# Patient Record
Sex: MALE | Race: WHITE | NOT HISPANIC OR LATINO | Employment: PART TIME | URBAN - METROPOLITAN AREA
[De-identification: names, ages, dates, MRNs, and addresses within clinical notes are randomized per-mention and may not be internally consistent; named-entity substitution may affect disease eponyms.]

---

## 2023-09-14 PROBLEM — I38 ENDOCARDITIS: Status: ACTIVE | Noted: 2023-09-14

## 2023-09-14 PROBLEM — R60.0 EDEMA OF RIGHT UPPER ARM: Status: ACTIVE | Noted: 2023-09-14

## 2023-09-14 PROBLEM — J18.9 PNEUMONIA: Status: ACTIVE | Noted: 2023-09-14

## 2023-09-14 PROBLEM — F19.90 IV DRUG USER: Status: ACTIVE | Noted: 2023-09-14

## 2023-09-14 PROBLEM — A41.9 SEPSIS: Status: ACTIVE | Noted: 2023-09-14

## 2023-09-16 ENCOUNTER — HOSPITAL ENCOUNTER (INPATIENT)
Facility: HOSPITAL | Age: 28
LOS: 2 days | Discharge: LEFT BEFORE DISCHARGE | DRG: 871 | End: 2023-09-18
Attending: INTERNAL MEDICINE | Admitting: INTERNAL MEDICINE
Payer: MEDICAID

## 2023-09-16 DIAGNOSIS — R07.9 CHEST PAIN: ICD-10-CM

## 2023-09-16 DIAGNOSIS — B95.62 MRSA BACTEREMIA: ICD-10-CM

## 2023-09-16 DIAGNOSIS — I38 ENDOCARDITIS: ICD-10-CM

## 2023-09-16 DIAGNOSIS — R78.81 MRSA BACTEREMIA: ICD-10-CM

## 2023-09-16 DIAGNOSIS — F11.20 OPIOID USE DISORDER, SEVERE, DEPENDENCE: Primary | Chronic | ICD-10-CM

## 2023-09-16 DIAGNOSIS — A41.9 SEPSIS: ICD-10-CM

## 2023-09-16 DIAGNOSIS — J94.8 HYDROPNEUMOTHORAX: ICD-10-CM

## 2023-09-16 DIAGNOSIS — F11.93 OPIOID WITHDRAWAL: ICD-10-CM

## 2023-09-16 DIAGNOSIS — I07.9 ENDOCARDITIS OF TRICUSPID VALVE: ICD-10-CM

## 2023-09-16 PROBLEM — I05.9 ENDOCARDITIS OF MITRAL VALVE: Status: ACTIVE | Noted: 2023-09-14

## 2023-09-16 PROBLEM — J15.212 PNEUMONIA OF LEFT LOWER LOBE DUE TO METHICILLIN-RESISTANT STAPHYLOCOCCUS AUREUS (MRSA): Status: ACTIVE | Noted: 2023-09-14

## 2023-09-16 PROBLEM — R65.21 SEPTIC SHOCK: Status: ACTIVE | Noted: 2023-09-16

## 2023-09-16 LAB
ALBUMIN SERPL BCP-MCNC: 1.5 G/DL (ref 3.5–5.2)
ALP SERPL-CCNC: 307 U/L (ref 55–135)
ALT SERPL W/O P-5'-P-CCNC: 50 U/L (ref 10–44)
ANION GAP SERPL CALC-SCNC: 10 MMOL/L (ref 8–16)
AST SERPL-CCNC: 75 U/L (ref 10–40)
BASOPHILS # BLD AUTO: 0.01 K/UL (ref 0–0.2)
BASOPHILS NFR BLD: 0.1 % (ref 0–1.9)
BILIRUB SERPL-MCNC: 1.9 MG/DL (ref 0.1–1)
BUN SERPL-MCNC: 21 MG/DL (ref 6–20)
CALCIUM SERPL-MCNC: 7.7 MG/DL (ref 8.7–10.5)
CHLORIDE SERPL-SCNC: 104 MMOL/L (ref 95–110)
CO2 SERPL-SCNC: 21 MMOL/L (ref 23–29)
CREAT SERPL-MCNC: 0.8 MG/DL (ref 0.5–1.4)
DIFFERENTIAL METHOD: ABNORMAL
EOSINOPHIL # BLD AUTO: 0.1 K/UL (ref 0–0.5)
EOSINOPHIL NFR BLD: 0.4 % (ref 0–8)
ERYTHROCYTE [DISTWIDTH] IN BLOOD BY AUTOMATED COUNT: 15.2 % (ref 11.5–14.5)
EST. GFR  (NO RACE VARIABLE): >60 ML/MIN/1.73 M^2
GLUCOSE SERPL-MCNC: 79 MG/DL (ref 70–110)
HCT VFR BLD AUTO: 23.6 % (ref 40–54)
HGB BLD-MCNC: 7.8 G/DL (ref 14–18)
IMM GRANULOCYTES # BLD AUTO: 0.09 K/UL (ref 0–0.04)
IMM GRANULOCYTES NFR BLD AUTO: 0.6 % (ref 0–0.5)
INR PPP: 1.3 (ref 0.8–1.2)
LYMPHOCYTES # BLD AUTO: 1.7 K/UL (ref 1–4.8)
LYMPHOCYTES NFR BLD: 10.9 % (ref 18–48)
MCH RBC QN AUTO: 25.9 PG (ref 27–31)
MCHC RBC AUTO-ENTMCNC: 33.1 G/DL (ref 32–36)
MCV RBC AUTO: 78 FL (ref 82–98)
MONOCYTES # BLD AUTO: 0.9 K/UL (ref 0.3–1)
MONOCYTES NFR BLD: 5.6 % (ref 4–15)
NEUTROPHILS # BLD AUTO: 13 K/UL (ref 1.8–7.7)
NEUTROPHILS NFR BLD: 82.4 % (ref 38–73)
NRBC BLD-RTO: 0 /100 WBC
PLATELET # BLD AUTO: 223 K/UL (ref 150–450)
PMV BLD AUTO: 10.6 FL (ref 9.2–12.9)
POCT GLUCOSE: 84 MG/DL (ref 70–110)
POTASSIUM SERPL-SCNC: 4.1 MMOL/L (ref 3.5–5.1)
PROT SERPL-MCNC: 4.8 G/DL (ref 6–8.4)
PROTHROMBIN TIME: 13.6 SEC (ref 9–12.5)
RBC # BLD AUTO: 3.01 M/UL (ref 4.6–6.2)
SODIUM SERPL-SCNC: 135 MMOL/L (ref 136–145)
VANCOMYCIN SERPL-MCNC: 11.7 UG/ML
WBC # BLD AUTO: 15.72 K/UL (ref 3.9–12.7)

## 2023-09-16 PROCEDURE — 99900035 HC TECH TIME PER 15 MIN (STAT)

## 2023-09-16 PROCEDURE — 99291 CRITICAL CARE FIRST HOUR: CPT | Mod: ,,, | Performed by: INTERNAL MEDICINE

## 2023-09-16 PROCEDURE — 99291 PR CRITICAL CARE, E/M 30-74 MINUTES: ICD-10-PCS | Mod: ,,, | Performed by: INTERNAL MEDICINE

## 2023-09-16 PROCEDURE — 20000000 HC ICU ROOM

## 2023-09-16 PROCEDURE — 63600175 PHARM REV CODE 636 W HCPCS: Performed by: INTERNAL MEDICINE

## 2023-09-16 PROCEDURE — 25000003 PHARM REV CODE 250

## 2023-09-16 PROCEDURE — 63600175 PHARM REV CODE 636 W HCPCS

## 2023-09-16 PROCEDURE — 85610 PROTHROMBIN TIME: CPT

## 2023-09-16 PROCEDURE — 27100171 HC OXYGEN HIGH FLOW UP TO 24 HOURS

## 2023-09-16 PROCEDURE — 94761 N-INVAS EAR/PLS OXIMETRY MLT: CPT

## 2023-09-16 PROCEDURE — 25000003 PHARM REV CODE 250: Performed by: INTERNAL MEDICINE

## 2023-09-16 PROCEDURE — 80202 ASSAY OF VANCOMYCIN: CPT | Performed by: INTERNAL MEDICINE

## 2023-09-16 PROCEDURE — 80053 COMPREHEN METABOLIC PANEL: CPT

## 2023-09-16 PROCEDURE — 27000207 HC ISOLATION

## 2023-09-16 PROCEDURE — 85025 COMPLETE CBC W/AUTO DIFF WBC: CPT

## 2023-09-16 RX ORDER — IBUPROFEN 200 MG
16 TABLET ORAL
Status: DISCONTINUED | OUTPATIENT
Start: 2023-09-16 | End: 2023-09-18 | Stop reason: HOSPADM

## 2023-09-16 RX ORDER — GLUCAGON 1 MG
1 KIT INJECTION
Status: DISCONTINUED | OUTPATIENT
Start: 2023-09-16 | End: 2023-09-18 | Stop reason: HOSPADM

## 2023-09-16 RX ORDER — ACETAMINOPHEN 325 MG/1
650 TABLET ORAL EVERY 6 HOURS PRN
Status: DISCONTINUED | OUTPATIENT
Start: 2023-09-16 | End: 2023-09-18 | Stop reason: HOSPADM

## 2023-09-16 RX ORDER — SODIUM CHLORIDE 0.9 % (FLUSH) 0.9 %
10 SYRINGE (ML) INJECTION EVERY 12 HOURS PRN
Status: DISCONTINUED | OUTPATIENT
Start: 2023-09-16 | End: 2023-09-18 | Stop reason: HOSPADM

## 2023-09-16 RX ORDER — HYDROMORPHONE HYDROCHLORIDE 1 MG/ML
1 INJECTION, SOLUTION INTRAMUSCULAR; INTRAVENOUS; SUBCUTANEOUS EVERY 6 HOURS PRN
Status: DISCONTINUED | OUTPATIENT
Start: 2023-09-16 | End: 2023-09-17

## 2023-09-16 RX ORDER — LORAZEPAM 2 MG/ML
2 INJECTION INTRAMUSCULAR EVERY 4 HOURS PRN
Status: DISCONTINUED | OUTPATIENT
Start: 2023-09-16 | End: 2023-09-18

## 2023-09-16 RX ORDER — NOREPINEPHRINE BITARTRATE/D5W 8 MG/250ML
0-3 PLASTIC BAG, INJECTION (ML) INTRAVENOUS CONTINUOUS
Status: DISCONTINUED | OUTPATIENT
Start: 2023-09-16 | End: 2023-09-16

## 2023-09-16 RX ORDER — DIAZEPAM 5 MG/1
10 TABLET ORAL 3 TIMES DAILY
Status: DISCONTINUED | OUTPATIENT
Start: 2023-09-17 | End: 2023-09-18 | Stop reason: HOSPADM

## 2023-09-16 RX ORDER — HEPARIN SODIUM 5000 [USP'U]/ML
5000 INJECTION, SOLUTION INTRAVENOUS; SUBCUTANEOUS EVERY 8 HOURS
Status: DISCONTINUED | OUTPATIENT
Start: 2023-09-16 | End: 2023-09-16

## 2023-09-16 RX ORDER — DIAZEPAM 5 MG/1
10 TABLET ORAL 2 TIMES DAILY
Status: DISCONTINUED | OUTPATIENT
Start: 2023-09-19 | End: 2023-09-18 | Stop reason: HOSPADM

## 2023-09-16 RX ORDER — HYDROMORPHONE HYDROCHLORIDE 1 MG/ML
1 INJECTION, SOLUTION INTRAMUSCULAR; INTRAVENOUS; SUBCUTANEOUS ONCE
Status: COMPLETED | OUTPATIENT
Start: 2023-09-16 | End: 2023-09-16

## 2023-09-16 RX ORDER — NALOXONE HCL 0.4 MG/ML
0.02 VIAL (ML) INJECTION
Status: DISCONTINUED | OUTPATIENT
Start: 2023-09-16 | End: 2023-09-18 | Stop reason: HOSPADM

## 2023-09-16 RX ORDER — IBUPROFEN 200 MG
24 TABLET ORAL
Status: DISCONTINUED | OUTPATIENT
Start: 2023-09-16 | End: 2023-09-18 | Stop reason: HOSPADM

## 2023-09-16 RX ORDER — ENOXAPARIN SODIUM 100 MG/ML
40 INJECTION SUBCUTANEOUS EVERY 24 HOURS
Status: DISCONTINUED | OUTPATIENT
Start: 2023-09-16 | End: 2023-09-18

## 2023-09-16 RX ADMIN — ACETAMINOPHEN 650 MG: 325 TABLET ORAL at 11:09

## 2023-09-16 RX ADMIN — LORAZEPAM 2 MG: 2 INJECTION INTRAMUSCULAR; INTRAVENOUS at 06:09

## 2023-09-16 RX ADMIN — HYDROMORPHONE HYDROCHLORIDE 1 MG: 1 INJECTION, SOLUTION INTRAMUSCULAR; INTRAVENOUS; SUBCUTANEOUS at 11:09

## 2023-09-16 RX ADMIN — HYDROMORPHONE HYDROCHLORIDE 1 MG: 1 INJECTION, SOLUTION INTRAMUSCULAR; INTRAVENOUS; SUBCUTANEOUS at 09:09

## 2023-09-16 RX ADMIN — PIPERACILLIN AND TAZOBACTAM 4.5 G: 4; .5 INJECTION, POWDER, LYOPHILIZED, FOR SOLUTION INTRAVENOUS; PARENTERAL at 10:09

## 2023-09-16 RX ADMIN — VANCOMYCIN HYDROCHLORIDE 1000 MG: 1 INJECTION, POWDER, LYOPHILIZED, FOR SOLUTION INTRAVENOUS at 09:09

## 2023-09-16 RX ADMIN — LORAZEPAM 2 MG: 2 INJECTION INTRAMUSCULAR; INTRAVENOUS at 10:09

## 2023-09-16 RX ADMIN — ACETAMINOPHEN 650 MG: 325 TABLET ORAL at 10:09

## 2023-09-16 RX ADMIN — HEPARIN SODIUM 5000 UNITS: 5000 INJECTION INTRAVENOUS; SUBCUTANEOUS at 06:09

## 2023-09-16 RX ADMIN — LORAZEPAM 2 MG: 2 INJECTION INTRAMUSCULAR; INTRAVENOUS at 12:09

## 2023-09-16 RX ADMIN — NOREPINEPHRINE BITARTRATE 0.05 MCG/KG/MIN: 8 INJECTION, SOLUTION INTRAVENOUS at 04:09

## 2023-09-16 RX ADMIN — HYDROMORPHONE HYDROCHLORIDE 1 MG: 1 INJECTION, SOLUTION INTRAMUSCULAR; INTRAVENOUS; SUBCUTANEOUS at 04:09

## 2023-09-16 RX ADMIN — PIPERACILLIN AND TAZOBACTAM 4.5 G: 4; .5 INJECTION, POWDER, LYOPHILIZED, FOR SOLUTION INTRAVENOUS; PARENTERAL at 05:09

## 2023-09-16 NOTE — HPI
"A 28-year-old homeless man with active IVDU who originally presented to [facility] on 9/12/23 with fatigue, malaise, SOB, and nausea for 2 days duration. On evaluation in the ED at that time he was noted to be in shock requiring pressor support. Imaging showed changes concerning for septic emboli and pneumonia. He was admitted and started on empiric cefepime plus vancomycin while awaiting culture results. 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 He was evaluated by CTS with plans for surgical intervention however on 9/14 he was found with drugs/drug paraphernalia after which the patient left against medical advice. On that same day, he presented to [facility]. Cultures were collected and orders for inpatient admission placed. Unfortunately, the patient once again left against medical advice.       His symptoms progressed and, per chart review, his roommate called 911 at which time he was taken back to [facility] (9/15/23). He was started on pressor support and had a chest tube placed on left lung due to a moderate size pneumothorax. Due to his known valvular vegetation, he was transferred to [facility] for CTS evaluation.     Infectious Diseases consulted for "endocarditis with septic emboli. transferred for CTS eval"        "

## 2023-09-16 NOTE — PLAN OF CARE
MICU DAILY GOALS     Family/Goals of care/Code Status   Code Status: Full Code    24H Vital Sign Range  Temp:  [98 °F (36.7 °C)-103 °F (39.4 °C)]   Pulse:  [100-140]   Resp:  [18-49]   BP: ()/(45-72)   SpO2:  [90 %-100 %]      Shift Events   CT chest completed. PRN ativan and dilaudid given for pain & CIWA protocol. Chest tube to water suction at beginning of shift per CTS -- minimal output throughout the day. No acute events throughout shift    AWAKE RASS: Goal - RASS Goal: 0-->alert and calm  Actual - RASS ([place] Agitation-Sedation Scale): alert and calm    Restraint necessity: Not necessary   BREATHE SBT: Not intubated    Coordinate A & B, analgesics/sedatives Pain: managed   SAT: Not intubated   Delirium CAM-ICU: Overall CAM-ICU: Negative   Early(intubated/ Progressive (non-intubated) Mobility MOVE Screen (INTUBATED ONLY): Pass    Activity: Activity Management: Arm raise - L1, Rolling - L1   Feeding/Nutrition Diet order: Diet/Nutrition Received: regular,     Thrombus DVT prophylaxis: VTE Required Core Measure: Pharmacological prophylaxis initiated/maintained   HOB Elevation Head of Bed (HOB) Positioning: HOB at 30-45 degrees   Ulcer Prophylaxis GI: no   Glucose control managed Glycemic Management: blood glucose monitored   Skin Skin assessed during: Daily Assessment    [] No Altered Skin Integrity Present    []Prevention Measures Documented      [] Yes- Altered Skin Integrity Present or Discovered   [] LDA Added if Not in Epic (Describe Wound)   [] New Altered Skin Integrity was Present on Admit and Documented in LDA   [] Wound Image Taken    Wound Care Consulted? No    Attending Nurse:  Dom Zuñiga RN/Staff Member:  Josie HAJI   Bowel Function no issues    Indwelling Catheter Necessity      Percutaneous Central Line Insertion/Assessment - Triple Lumen  09/15/23 2305 Internal Jugular Right-Line Necessity Review: Poor venous access     De-escalation Antibiotics Yes       VS and assessment per flow  sheet, patient progressing towards goals as tolerated, plan of care reviewed with  Carlito Rosario , all concerns addressed, will continue to monitor.

## 2023-09-16 NOTE — PLAN OF CARE
Levo off. Pt now on 2L O2 via nasal cannula. No acute events overnight.    Problem: Adult Inpatient Plan of Care  Goal: Plan of Care Review  Outcome: Ongoing, Progressing  Goal: Patient-Specific Goal (Individualized)  Outcome: Ongoing, Progressing  Goal: Absence of Hospital-Acquired Illness or Injury  Outcome: Ongoing, Progressing  Goal: Optimal Comfort and Wellbeing  Outcome: Ongoing, Progressing  Goal: Readiness for Transition of Care  Outcome: Ongoing, Progressing     Problem: Adjustment to Illness (Sepsis/Septic Shock)  Goal: Optimal Coping  Outcome: Ongoing, Progressing     Problem: Bleeding (Sepsis/Septic Shock)  Goal: Absence of Bleeding  Outcome: Ongoing, Progressing

## 2023-09-16 NOTE — H&P
[facility] - Cardiac Medical ICU  Critical Care Medicine  History & Physical    Patient Name: Carlito Rosario  MRN: [id number]  Admission Date: 9/16/2023  Hospital Length of Stay: 0 days  Code Status: Full Code  Attending Physician: Alen Quinones MD   Primary Care Provider: Nereida, Primary Doctor   Principal Problem: Hydropneumothorax    Subjective:     HPI:  28-year-old male with medical history significant for IV drug use, pulmonary septic emboli hydropneumothorax and infective endocarditis presents as transfer from [facility] [place] emergency department for Cardiothoracic surgery evaluation and VATS consideration.  Per chart review, patient was being treated for septic shock, infective endocarditis and respiratory failure at [facility] where he reportedly signed out AMA.  During his stay at OSH he required vasopressors and BiPAP on admission. Imaging at that time showed concern for septic emboli and left hydropneumothorax lung base likely adjacent to necrotic pneumonia. TTE revealed EF of 55-60 %, moderate tricuspid regurgitation and a moderate-size vegetation or mass on tricuspid valve. He was treated with vancomycin and Zosyn with plan for VATS. He was reportedly caught using some Illicit substance (thought to be heroin) and syringes were confiscated. He subsequently chose to leave [place]. Per chart review, he stated medical staff at [facility] were rude to him and he was not happy with his care.    On presentation to OSH ED, he was noted to be afebrile, tachycardic and hypotensive to 105/64. He had leukocytosis with WBC of 25.3,  and lactate 1.6. Rapid MRSA ID from blood culture was positive. Chest tube was placed and he was transferred to [facility] for CTS evaluation.      Hospital/ICU Course:  No notes on file     Past Medical History:   Diagnosis Date    IVDU (intravenous drug user)        No past surgical history on file.    Review of patient's allergies indicates:  No Known Allergies    Family  History    None       Tobacco Use    Smoking status: Every Day     Types: Cigarettes    Smokeless tobacco: Not on file   Substance and Sexual Activity    Alcohol use: Not Currently    Drug use: Yes     Types: Heroin    Sexual activity: Never      Review of Systems   Reason unable to perform ROS: somnolence.     Objective:     Vital Signs (Most Recent):  Temp: 98.1 °F (36.7 °C) (09/16/23 0340)  Pulse: (!) 115 (09/16/23 0341)  Resp: (!) 27 (09/16/23 0341)  BP: (!) 115/59 (09/16/23 0340)  SpO2: 99 % (09/16/23 0341) Vital Signs (24h Range):  Temp:  [98.1 °F (36.7 °C)-103 °F (39.4 °C)] 98.1 °F (36.7 °C)  Pulse:  [111-140] 115  Resp:  [17-49] 27  SpO2:  [90 %-100 %] 99 %  BP: ()/(45-81) 115/59   Weight: 72.3 kg (159 lb 6.3 oz)  Body mass index is 21.62 kg/m².    No intake or output data in the 24 hours ending 09/16/23 0440       Physical Exam  Constitutional:       Appearance: He is ill-appearing.      Comments: somnolence   HENT:      Head: Normocephalic.      Mouth/Throat:      Mouth: Mucous membranes are moist.      Pharynx: Oropharynx is clear. No oropharyngeal exudate.   Eyes:      General: No scleral icterus.     Extraocular Movements: Extraocular movements intact.      Pupils: Pupils are equal, round, and reactive to light.   Cardiovascular:      Rate and Rhythm: Tachycardia present.      Heart sounds: Normal heart sounds.   Pulmonary:      Breath sounds: No rales.   Abdominal:      General: Abdomen is flat. There is no distension.      Palpations: Abdomen is soft. There is no mass.      Tenderness: There is no abdominal tenderness. There is no right CVA tenderness or left CVA tenderness.   Musculoskeletal:         General: Swelling present. Normal range of motion.      Cervical back: Normal range of motion. No tenderness.      Right lower leg: Edema present.      Left lower leg: Edema present.   Lymphadenopathy:      Cervical: No cervical adenopathy.   Skin:     General: Skin is warm and dry.       Coloration: Skin is not jaundiced.      Findings: No bruising.   Neurological:      Comments: Unable to ellicit            Vents:     Lines/Drains/Airways       Central Venous Catheter Line  Duration             Percutaneous Central Line Insertion/Assessment - Triple Lumen  09/15/23 2305 Internal Jugular Right <1 day              Drain  Duration                  Chest Tube 09/16/23 Tube - 1 Left <1 day              Peripheral Intravenous Line  Duration                  Peripheral IV - Single Lumen 09/15/23 1940 20 G Left Antecubital <1 day                  Significant Labs:    CBC/Anemia Profile:  Recent Labs   Lab 09/14/23  0712 09/14/23  1600 09/15/23  1945   WBC 19.8* 25.39* 9.14   HGB 8.2* 10.7* 8.6*   HCT 24.0* 31.9* 25.5*    286 288   MCV 76.2* 79* 77*   RDW 15.3* 15.6* 14.9*        Chemistries:  Recent Labs   Lab 09/14/23  1600 09/15/23  1945    131*   K 5.1 4.2    99   CO2 25 27   BUN 27* 30*   CREATININE 0.84 1.02   CALCIUM 9.2 8.0*   ALBUMIN 3.1* 2.4*   PROT 7.2 5.7*   BILITOT 0.8 1.2   ALKPHOS 393* 296*   ALT 32 28   AST 31 32       All pertinent labs within the past 24 hours have been reviewed.    Significant Imaging: I have reviewed all pertinent imaging results/findings within the past 24 hours.    Assessment/Plan:     Psychiatric  IV drug user  Known Heroin use. Received lorazepam in the ED.     - Monitor for signs of withdrawal     Pulmonary  * Hydropneumothorax  Per OSH imaging, found to have mall left hydropneumothorax at the lung base likely due to adjacent necrotic pneumonia. Chest tube placed at OSH ED. Draining bloody fluid.     - CTS consult placed ; follow up in am  - Chest tube care     Pneumonia  Likely necrotic     Cardiac/Vascular  Endocarditis  Patient with noted tricuspid vegetation on TTE at OSH and septic emboli in the setting of IVDU.     - Continue vancomycin  - Given + MRSA, would consider ID consult      ID  Sepsis  This patient does have evidence of infective  focus  My overall impression is sepsis.  Source: Blood  Antibiotics given-     Antibiotics (72h ago, onward)      Start     Stop Route Frequency Ordered    09/16/23 0435  vancomycin - pharmacy to dose  (vancomycin IVPB (PEDS and ADULTS))        See Hyperspace for full Linked Orders Report.    -- IV pharmacy to manage frequency 09/16/23 0355          Latest lactate reviewed-  Recent Labs   Lab 09/14/23  1600 09/15/23  1839 09/15/23  2041   LACTATE 1.6 0.8 0.6     Organ dysfunction indicated by Acute respiratory failure    Fluid challenge Actual Body weight- Patient will receive 30ml/kg actual body weight to calculate fluid bolus for treatment of septic shock.     Post- resuscitation assessment Yes Perfusion exam was performed within 6 hours of septic shock presentation after bolus shows Adequate tissue perfusion assessed by non-invasive monitoring       Will Not start Pressors- Levophed for MAP of 65  Source control achieved by:  Antibiotics    Noted positive MRSA rapid ID at OSH. Blood culture results pending. Likely bacteremia in the setting of known IVDU.     - Continue Vancomycin  - F/U blood cultures  - Consider chest tube fluid culture         Critical Care Daily Checklist:    A: Awake: RASS Goal/Actual Goal:    Actual:     B: Spontaneous Breathing Trial Performed?     C: SAT & SBT Coordinated?                        D: Delirium: CAM-ICU Overall CAM-ICU: Negative   E: Early Mobility Performed? No   F: Feeding Goal:    Status:     Current Diet Order   No orders of the defined types were placed in this encounter.      AS: Analgesia/Sedation NONE   T: Thromboembolic Prophylaxis Heparin   H: HOB > 300 Yes   U: Stress Ulcer Prophylaxis (if needed)    G: Glucose Control    B: Bowel Function     I: Indwelling Catheter (Lines & Alvarado) Necessity PIV, trialysis   D: De-escalation of Antimicrobials/Pharmacotherapies Vancomycin    Plan for the day/ETD Abx, CTS    Code Status:  Family/Goals of Care: Full Code          Critical secondary to Patient has a condition that poses threat to life and bodily function: Septic Shock     Critical care was time spent personally by me on the following activities: development of treatment plan with patient or surrogate and bedside caregivers, discussions with consultants, evaluation of patient's response to treatment, examination of patient, ordering and performing treatments and interventions, ordering and review of laboratory studies, ordering and review of radiographic studies, pulse oximetry, re-evaluation of patient's condition. This critical care time did not overlap with that of any other provider or involve time for any procedures.    Jaymie Collier MD  Internal Medicine, PGY-2  [facility]

## 2023-09-16 NOTE — SUBJECTIVE & OBJECTIVE
Past Medical History:   Diagnosis Date    IVDU (intravenous drug user)        No past surgical history on file.    Review of patient's allergies indicates:  No Known Allergies    Family History    None       Tobacco Use    Smoking status: Every Day     Types: Cigarettes    Smokeless tobacco: Not on file   Substance and Sexual Activity    Alcohol use: Not Currently    Drug use: Yes     Types: Heroin    Sexual activity: Never      Review of Systems   Reason unable to perform ROS: somnolence.     Objective:     Vital Signs (Most Recent):  Temp: 98.1 °F (36.7 °C) (09/16/23 0340)  Pulse: (!) 115 (09/16/23 0341)  Resp: (!) 27 (09/16/23 0341)  BP: (!) 115/59 (09/16/23 0340)  SpO2: 99 % (09/16/23 0341) Vital Signs (24h Range):  Temp:  [98.1 °F (36.7 °C)-103 °F (39.4 °C)] 98.1 °F (36.7 °C)  Pulse:  [111-140] 115  Resp:  [17-49] 27  SpO2:  [90 %-100 %] 99 %  BP: ()/(45-81) 115/59   Weight: 72.3 kg (159 lb 6.3 oz)  Body mass index is 21.62 kg/m².    No intake or output data in the 24 hours ending 09/16/23 0440       Physical Exam  Constitutional:       Appearance: He is ill-appearing.      Comments: somnolence   HENT:      Head: Normocephalic.      Mouth/Throat:      Mouth: Mucous membranes are moist.      Pharynx: Oropharynx is clear. No oropharyngeal exudate.   Eyes:      General: No scleral icterus.     Extraocular Movements: Extraocular movements intact.      Pupils: Pupils are equal, round, and reactive to light.   Cardiovascular:      Rate and Rhythm: Tachycardia present.      Heart sounds: Normal heart sounds.   Pulmonary:      Breath sounds: No rales.   Abdominal:      General: Abdomen is flat. There is no distension.      Palpations: Abdomen is soft. There is no mass.      Tenderness: There is no abdominal tenderness. There is no right CVA tenderness or left CVA tenderness.   Musculoskeletal:         General: Swelling present. Normal range of motion.      Cervical back: Normal range of motion. No tenderness.       Right lower leg: Edema present.      Left lower leg: Edema present.   Lymphadenopathy:      Cervical: No cervical adenopathy.   Skin:     General: Skin is warm and dry.      Coloration: Skin is not jaundiced.      Findings: No bruising.   Neurological:      Comments: Unable to ellicit            Vents:     Lines/Drains/Airways       Central Venous Catheter Line  Duration             Percutaneous Central Line Insertion/Assessment - Triple Lumen  09/15/23 2305 Internal Jugular Right <1 day              Drain  Duration                  Chest Tube 09/16/23 Tube - 1 Left <1 day              Peripheral Intravenous Line  Duration                  Peripheral IV - Single Lumen 09/15/23 1940 20 G Left Antecubital <1 day                  Significant Labs:    CBC/Anemia Profile:  Recent Labs   Lab 09/14/23  0712 09/14/23  1600 09/15/23  1945   WBC 19.8* 25.39* 9.14   HGB 8.2* 10.7* 8.6*   HCT 24.0* 31.9* 25.5*    286 288   MCV 76.2* 79* 77*   RDW 15.3* 15.6* 14.9*        Chemistries:  Recent Labs   Lab 09/14/23  1600 09/15/23  1945    131*   K 5.1 4.2    99   CO2 25 27   BUN 27* 30*   CREATININE 0.84 1.02   CALCIUM 9.2 8.0*   ALBUMIN 3.1* 2.4*   PROT 7.2 5.7*   BILITOT 0.8 1.2   ALKPHOS 393* 296*   ALT 32 28   AST 31 32       All pertinent labs within the past 24 hours have been reviewed.    Significant Imaging: I have reviewed all pertinent imaging results/findings within the past 24 hours.

## 2023-09-16 NOTE — HPI
28-year-old male with medical history significant for IV drug use, pulmonary septic emboli hydropneumothorax and infective endocarditis presents as transfer from [facility] emergency department for Cardiothoracic surgery evaluation and VATS consideration.  Per chart review, patient was being treated for septic shock, infective endocarditis and respiratory failure at [facility] where he reportedly signed out AMA.  During his stay at OSH he required vasopressors and BiPAP on admission. Imaging at that time showed concern for septic emboli and left hydropneumothorax lung base likely adjacent to necrotic pneumonia. TTE revealed EF of 55-60 %, moderate tricuspid regurgitation and a moderate-size vegetation or mass on tricuspid valve. He was treated with vancomycin and Zosyn with plan for VATS. He was reportedly caught using some Illicit substance (thought to be heroin) and syringes were confiscated. He subsequently chose to leave [facility]. Per chart review, he stated medical staff at [facility] were rude to him and he was not happy with his care.    On presentation to OSH ED, he was noted to be afebrile, tachycardic and hypotensive to 105/64. He had leukocytosis with WBC of 25.3,  and lactate 1.6. Rapid MRSA ID from blood culture was positive. Chest tube was placed and he was transferred to [facility] for CTS evaluation.

## 2023-09-16 NOTE — PLAN OF CARE
Critical Care Update    Chest tube examined, set to water seal. CT chest with loculated L hydroPTX of moderate size, pulm nodules likely septic emboli, ground-glass and solid opacities likely multifocal PNA, and signs of bronchitis. Daily CXR ordered.     Discussed with ID, Vanc/Darren. Hep C, RPR, HIV r/o. Repeating blood Cx tomorrow. Levophed weaned from 0.05 to 0.01    Discussed with CTS, agree PTX resolving. No plans for surgical intervention regarding endocarditis.    Discussed limited visitation policy with family given suspected substance use during last hospitalization. Concern for benzo, moreso opioid withdrawals. PRN dilaudid for pain 2/2 chest tube. CIWA with PRN ativan ordered.    Addendum - Required ativan multiple times today. Valium ordered.     Shoaib Van MD  MICU 1

## 2023-09-16 NOTE — SUBJECTIVE & OBJECTIVE
Past Medical History:   Diagnosis Date    IVDU (intravenous drug user)        No past surgical history on file.    Review of patient's allergies indicates:  No Known Allergies    Medications:  Medications Prior to Admission   Medication Sig    erythromycin (ROMYCIN) ophthalmic ointment Place a 1/2 inch ribbon of ointment into the lower eyelid.     Antibiotics (From admission, onward)      Start     Stop Route Frequency Ordered    09/16/23 1000  vancomycin (VANCOCIN) 1,000 mg in dextrose 5 % (D5W) 250 mL IVPB (Vial-Mate)         -- IV Every 12 hours (non-standard times) 09/16/23 0548    09/16/23 0930  piperacillin-tazobactam (ZOSYN) 4.5 g in dextrose 5 % in water (D5W) 100 mL IVPB (MB+)         -- IV Every 8 hours (non-standard times) 09/16/23 0819    09/16/23 0435  vancomycin - pharmacy to dose  (vancomycin IVPB (PEDS and ADULTS))        [facility] for full [facility] Orders Report.    -- IV pharmacy to manage frequency 09/16/23 0355          Antifungals (From admission, onward)      None          Antivirals (From admission, onward)      None             Immunization History   Administered Date(s) Administered    Tdap 11/08/2018       Family History    None       Social History     Socioeconomic History    Marital status: Single   Tobacco Use    Smoking status: Every Day     Types: Cigarettes   Substance and Sexual Activity    Alcohol use: Not Currently    Drug use: Yes     Types: Heroin    Sexual activity: Never     Review of Systems   Unable to perform ROS: Mental status change     Objective:     Vital Signs (Most Recent):  Temp: 98.9 °F (37.2 °C) (09/16/23 0705)  Pulse: (!) 131 (09/16/23 1005)  Resp: (!) 30 (09/16/23 1005)  BP: (!) 110/53 (09/16/23 1005)  SpO2: 95 % (09/16/23 1005) Vital Signs (24h Range):  Temp:  [98.1 °F (36.7 °C)-103 °F (39.4 °C)] 98.9 °F (37.2 °C)  Pulse:  [107-140] 131  Resp:  [17-49] 30  SpO2:  [90 %-100 %] 95 %  BP: ()/(45-81) 110/53     Weight: 72.3 kg (159 lb 6.3 oz)  Body mass  index is 21.62 kg/m².    Estimated Creatinine Clearance: 140.6 mL/min (based on SCr of 0.8 mg/dL).     Physical Exam  Vitals reviewed.   Constitutional:       General: He is sleeping.      Appearance: He is well-developed. He is ill-appearing.   HENT:      Head: Normocephalic and atraumatic.      Right Ear: External ear normal.      Left Ear: External ear normal.   Eyes:      Conjunctiva/sclera: Conjunctivae normal.   Neck:      Thyroid: No thyromegaly.      Vascular: JVD present.   Cardiovascular:      Rate and Rhythm: Regular rhythm. Tachycardia present.      Heart sounds: Murmur heard.      Systolic murmur is present with a grade of 4/6.   Pulmonary:      Effort: Pulmonary effort is normal.      Breath sounds: Normal breath sounds. No wheezing or rales.   Abdominal:      General: Bowel sounds are normal.      Palpations: Abdomen is soft. There is no mass.      Tenderness: There is no abdominal tenderness. There is no rebound.   Musculoskeletal:      Right lower leg: Edema present.      Left lower leg: Edema present.   Lymphadenopathy:      Cervical: No cervical adenopathy.   Skin:     General: Skin is warm and dry.   Neurological:      Mental Status: He is lethargic.          Significant Labs: Blood Culture:   Recent Labs   Lab 09/14/23  1600 09/15/23  1945 09/15/23  2332   LABBLOO Gram stain aer bottle: Gram positive cocci in clusters resembling Staph  Results called to and read back by:Estee Melendez RN 09/15/2023  12:30 by  dto  STAPHYLOCOCCUS AUREUS  Refer to previous identification/susceptibility for blood culture   collected 9/14/23  *  Gram stain aer bottle: Gram positive cocci in clusters resembling Staph  Results called to and read back by:Estee Melendez RN  09/15/2023  12:30 by  dto  Gram stain lee bottle: Gram positive cocci in clusters resembling Staph  Positive results previously called 09/15/2023  16:30 LB  STAPHYLOCOCCUS AUREUS  Susceptibility pending  * Gram stain aer bottle: Gram positive  "cocci in clusters resembling Staph  Results called to and read back by: Madison Shipley RN in EMR  09/16/2023  05:57  Gram stain lee bottle: Gram positive cocci in clusters resembling Staph  Positive results previously called No Growth to date     BMP:   Recent Labs   Lab 09/16/23  0525   GLU 79   *   K 4.1      CO2 21*   BUN 21*   CREATININE 0.8   CALCIUM 7.7*     CBC:   Recent Labs   Lab 09/14/23  1600 09/15/23  1945 09/16/23  0525   WBC 25.39* 9.14 15.72*   HGB 10.7* 8.6* 7.8*   HCT 31.9* 25.5* 23.6*    288 223     Respiratory Culture: No results for input(s): "GSRESP", "RESPIRATORYC" in the last 4320 hours.  Urine Culture: No results for input(s): "LABURIN" in the last 4320 hours.  Urine Studies: No results for input(s): "COLORU", "APPEARANCEUA", "PHUR", "SPECGRAV", "PROTEINUA", "GLUCUA", "KETONESU", "BILIRUBINUA", "OCCULTUA", "NITRITE", "UROBILINOGEN", "LEUKOCYTESUR", "RBCUA", "WBCUA", "BACTERIA", "SQUAMEPITHEL", "HYALINECASTS" in the last 4320 hours.    Invalid input(s): "WRIGHTSUR"  Wound Culture: No results for input(s): "LABAERO" in the last 4320 hours.    Significant Imaging: I have reviewed all pertinent imaging results/findings within the past 24 hours.  "

## 2023-09-16 NOTE — CONSULTS
[facility] - Cardiac Medical ICU  General Surgery  Consult Note    Inpatient consult to Cardiothoracic Surgery  Consult performed by: Catherine Del Toro MD  Consult ordered by: Jaymie Collier MD        Subjective:     Chief Complaint/Reason for Admission: septic shock    History of Present Illness:   Patient is a 28y M w/ hx of IVDU, pulmonary septic emboli, hydropneumothorax and infective endocarditis. Who presented to the hospital as a transfer from [facility] for evaluation for VATS. Patient has hx of recent infective endocarditis w/ respiratory distress, however he left this admission AMA following use of illicit drugs in his hospital room per documentation. At this visit he was revealed to have what appeared to be L hydropneumothorax w/ necrotic PNA. At [facility] he was found to have developed a pneumothorax. CXR from today reveals CT in appropriate location with resolution of the pneumothorax. Patient is tachycardic on 2L NC. He denies SOB. Endorses generalized chest pain worse with deep breaths. CT chest pending today.    Current Facility-Administered Medications on File Prior to Encounter   Medication    [COMPLETED] acetaminophen tablet 1,000 mg    [COMPLETED] LORazepam injection 1 mg    [COMPLETED] piperacillin-tazobactam (ZOSYN) 4.5 g in dextrose 5 % in water (D5W) 100 mL IVPB (MB+)    [COMPLETED] sodium chloride 0.9% bolus 1,000 mL 1,000 mL    [COMPLETED] vancomycin (VANCOCIN) 1,000 mg in dextrose 5 % (D5W) 250 mL IVPB (Vial-Mate)    [DISCONTINUED] LIDOcaine-EPINEPHrine (PF) 1%-1:200,000 injection 1 mL    [DISCONTINUED] NORepinephrine bitartrate-NaCl 8 mg/250 mL (32 mcg/mL) infusion     Current Outpatient Medications on File Prior to Encounter   Medication Sig    erythromycin (ROMYCIN) ophthalmic ointment Place a 1/2 inch ribbon of ointment into the lower eyelid.       Review of patient's allergies indicates:  No Known Allergies    Past Medical History:   Diagnosis Date    IVDU (intravenous drug user)       No past surgical history on file.  Family History    None       Tobacco Use    Smoking status: Every Day     Types: Cigarettes    Smokeless tobacco: Not on file   Substance and Sexual Activity    Alcohol use: Not Currently    Drug use: Yes     Types: Heroin    Sexual activity: Never     Review of Systems   Constitutional: Negative.    HENT: Negative.     Respiratory:  Positive for cough and chest tightness. Negative for shortness of breath.    Cardiovascular:  Positive for chest pain.   Gastrointestinal: Negative.    Neurological: Negative.      Objective:     Vital Signs (Most Recent):  Temp: 98.9 °F (37.2 °C) (09/16/23 0705)  Pulse: (!) 131 (09/16/23 1005)  Resp: (!) 30 (09/16/23 1005)  BP: (!) 110/53 (09/16/23 1005)  SpO2: 95 % (09/16/23 1005) Vital Signs (24h Range):  Temp:  [98.1 °F (36.7 °C)-103 °F (39.4 °C)] 98.9 °F (37.2 °C)  Pulse:  [107-140] 131  Resp:  [17-49] 30  SpO2:  [90 %-100 %] 95 %  BP: ()/(45-81) 110/53     Weight: 72.3 kg (159 lb 6.3 oz)  Body mass index is 21.62 kg/m².      Intake/Output Summary (Last 24 hours) at 9/16/2023 1041  Last data filed at 9/16/2023 1005  Gross per 24 hour   Intake 8.5 ml   Output 1175 ml   Net -1166.5 ml       Physical Exam  Constitutional:       General: He is not in acute distress.     Appearance: He is ill-appearing.      Comments: thin   HENT:      Head: Normocephalic and atraumatic.      Mouth/Throat:      Mouth: Mucous membranes are moist.   Eyes:      Pupils: Pupils are equal, round, and reactive to light.   Cardiovascular:      Rate and Rhythm: Tachycardia present.   Pulmonary:      Effort: Pulmonary effort is normal. No respiratory distress.      Breath sounds: No wheezing or rales.      Comments: 2L NC  Normal WOB  Abdominal:      General: Abdomen is flat. There is no distension.      Palpations: Abdomen is soft.   Musculoskeletal:         General: Normal range of motion.      Cervical back: Normal range of motion.   Skin:     General: Skin is warm  and dry.   Neurological:      General: No focal deficit present.      Mental Status: He is alert and oriented to person, place, and time.   Psychiatric:         Mood and Affect: Mood normal.         Behavior: Behavior normal.         Significant Labs:  All pertinent labs from the last 24 hours have been reviewed.    Significant Diagnostics:  I have reviewed all pertinent imaging results/findings within the past 24 hours.    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    Chest tube in place, no air leak, thin SS ouptut.  Pneumothorax appears to have resolved with CT placement.   Recommend chest tube to water seal, daily CXR.   Likely will be able to remove in a few days.   No plans for surgical intervention at this time.  We will review CT results.     Thank you for your consult. I will follow-up with patient. Please contact us if you have any additional questions.    Catherine Del Toro MD  General Surgery  [facility] - Cardiac Medical ICU

## 2023-09-16 NOTE — PROGRESS NOTES
Pharmacokinetic Initial Assessment: IV Vancomycin    Assessment/Plan:    Initiate intravenous vancomycin with a maintenance dose of vancomycin 1000 mg IV every 12 hours  Desired empiric serum trough concentration is 15 to 20 mcg/mL  Draw vancomycin trough level 60 min on 9/17 at approximately 0900  Pharmacy will continue to follow and monitor vancomycin.      Please contact pharmacy at extension [phone] with any questions regarding this assessment.     Thank you for the consult,   Michelle Heath       Patient brief summary:  Carlito Rosario is a 28 y.o. male initiated on antimicrobial therapy with IV Vancomycin for treatment of suspected bacteremia    Drug Allergies:   Review of patient's allergies indicates:  No Known Allergies    Actual Body Weight:   72.3 kg    Renal Function:   Estimated Creatinine Clearance: 110.3 mL/min (based on SCr of 1.02 mg/dL).    Dialysis Method (if applicable):  N/A    CBC (last 72 hours):  Recent Labs   Lab Result Units 09/14/23  0712 09/14/23  1600 09/15/23  1945 09/16/23  0525   WBC K/uL 19.8* 25.39* 9.14 15.72*   Hemoglobin g/dL 8.2* 10.7* 8.6* 7.8*   Hematocrit % 24.0* 31.9* 25.5* 23.6*   Platelets K/uL 196 286 288 223   Gran % %  --  90.5* 80.2* 82.4*   Lymph % %  --  4.2* 16.4* 10.9*   Mono % %  --  3.9* 2.7* 5.6   Eosinophil % %  --  0.0 0.2 0.4   Basophil % %  --  0.2 0.1 0.1   Differential Method   --  Automated Automated Automated       Metabolic Panel (last 72 hours):  Recent Labs   Lab Result Units 09/14/23  0430 09/14/23  1600 09/15/23  1945 09/15/23  2014   Sodium mmol/L 140 141 131*  --    Potassium mmol/L  --  5.1 4.2  --    Chloride mmol/L 112* 107 99  --    CO2 mmol/L 22 25 27  --    Glucose mg/dL 164* 110 76  --    BUN mg/dL  --  27* 30*  --    Blood Urea Nitrogen mg/dL 20  --   --   --    Creatinine mg/dL 0.80* 0.84 1.02  --    Creatinine, Urine mg/dL  --   --   --  60.3   Albumin g/dL 2.1* 3.1* 2.4*  --    Total Bilirubin mg/dL 0.6 0.8 1.2  --    Alkaline  Phosphatase U/L 311* 393* 296*  --    AST U/L 11* 31 32  --    ALT U/L 19 32 28  --        Drug levels (last 3 results):  Recent Labs   Lab Result Units 09/14/23  1600 09/16/23  0512   Vancomycin, Random ug/mL 7.9 11.7       Microbiologic Results:  Microbiology Results (last 7 days)       ** No results found for the last 168 hours. **

## 2023-09-16 NOTE — ASSESSMENT & PLAN NOTE
Seen on echocardiogram performed at [facility]. No dimension on chart.  · Agree with CTS evaluation.

## 2023-09-16 NOTE — ASSESSMENT & PLAN NOTE
With probable parapneumonic effusion vs empyema. He was being considered for VATS procedure at [facility]. Now s/p chest tube placement at [facility] on 9/15.  · Agree with Zosyn for now.  · Agree with CTS evaluation.

## 2023-09-16 NOTE — ASSESSMENT & PLAN NOTE
Per OSH imaging, found to have mall left hydropneumothorax at the lung base likely due to adjacent necrotic pneumonia. Chest tube placed at OSH ED. Draining bloody fluid.     - CTS: no surgical intervention  - Chest tube care, clamped on 9/17  - IR consulted for pigtail

## 2023-09-16 NOTE — ASSESSMENT & PLAN NOTE
I have reviewed hospital notes from  MICU service and other specialty providers. I have also reviewed CBC, CMP/BMP,  cultures and imaging with my interpretation as documented.     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 Suspect likely due to IVDU. On norepinephrine today.   Continue vancomycin.   PharmD vancomycin monitoring protocol.   Repeat blood cultures tomorrow in AM ordered.   Hepatitis C antibody, RPR, and HIV 1/2 ag/ab ordered for tomorrow in AM.   Discussed ID management with patients father at the bedside.   Discussed management plan with the staff and/or members from MICU service.

## 2023-09-16 NOTE — ASSESSMENT & PLAN NOTE
This patient does have evidence of infective focus  My overall impression is sepsis.  Source: Blood  Antibiotics given-     Antibiotics (72h ago, onward)    Start     Stop Route Frequency Ordered    09/16/23 0435  vancomycin - pharmacy to dose  (vancomycin IVPB (PEDS and ADULTS))        See Hyperspace for full Linked Orders Report.    -- IV pharmacy to manage frequency 09/16/23 0355        Latest lactate reviewed-  Recent Labs   Lab 09/14/23  1600 09/15/23  1839 09/15/23  2041   LACTATE 1.6 0.8 0.6     Organ dysfunction indicated by Acute respiratory failure    Fluid challenge Actual Body weight- Patient will receive 30ml/kg actual body weight to calculate fluid bolus for treatment of septic shock.     Post- resuscitation assessment Yes Perfusion exam was performed within 6 hours of septic shock presentation after bolus shows Adequate tissue perfusion assessed by non-invasive monitoring       Will Not start Pressors- Levophed for MAP of 65  Source control achieved by:  Antibiotics    Noted positive MRSA rapid ID at OSH. Blood culture results pending. Likely bacteremia in the setting of known IVDU.     - Continue Vancomycin  - F/U blood cultures  - Consider chest tube fluid culture

## 2023-09-16 NOTE — ASSESSMENT & PLAN NOTE
Patient with noted tricuspid vegetation on TTE at OSH and septic emboli in the setting of IVDU.     - Continue vancomycin  - Given + MRSA, would consider ID consult

## 2023-09-16 NOTE — CONSULTS
[facility] - Cardiac Medical ICU  Infectious Disease  Consult Note    Patient Name: Carlito Rosario  MRN: [id number]  Admission Date: 9/16/2023  Hospital Length of Stay: 0 days  Attending Physician: Alen Quinones MD  Primary Care Provider: Nereida, Primary Doctor     Isolation Status: Contact    Patient information was obtained from parent, past medical records and ER records.      Inpatient consult to Infectious Diseases  Consult performed by: Leah Batista MD  Consult ordered by: Shoaib Van MD        Assessment/Plan:     Pulmonary  * Hydropneumothorax  Suspect likely spontaneous rupture due to infectious process.   Management per ICU team.     Pneumonia of left lower lobe due to methicillin-resistant Staphylococcus aureus (MRSA)  With probable parapneumonic effusion vs empyema. He was being considered for VATS procedure at [facility]. Now s/p chest tube placement at [facility] on 9/15.  Agree with Zosyn for now.  Agree with CTS evaluation.    Cardiac/Vascular  Endocarditis of tricuspid valve  Seen on echocardiogram performed at [facility]. No dimension on chart.  Agree with CTS evaluation.     ID  MRSA bacteremia  Secondary to IVDU.  See septic shock.    Septic shock  I have reviewed hospital notes from  MICU service and other specialty providers. I have also reviewed CBC, CMP/BMP,  cultures and imaging with my interpretation as documented.     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 Suspect likely due to IVDU. On norepinephrine today.  Continue vancomycin.  PharmD vancomycin monitoring protocol.  Repeat blood cultures tomorrow in AM ordered.  Hepatitis C antibody, RPR, and HIV 1/2 ag/ab ordered for tomorrow in AM.  Discussed ID management with patients father at the bedside.  Discussed management plan with the staff and/or members from MICU service.        Critical care time: 35 minutes   I personally spent critical care time on the  "following: evaluating this patient's organ dysfunction, development of treatment plan, discussing treatment plan with patient or surrogate and bedside caregivers, discussions with critical care service and/or consultants, evaluation of patient's response to treatment, physical examination of patient, ordering and review of treatments interventions, laboratory studies, and radiographic studies, re-evaluation of patient's condition. This critical care time did not overlap with that of any other provider of the same specialty or involve time for procedures.     Thank you for your consult. I will follow-up with patient. Please contact us if you have any additional questions.    Leah David MD  Infectious Disease  [facility] - Cardiac Medical ICU    Subjective:     Principal Problem: Hydropneumothorax    HPI: A 28-year-old homeless man with active IVDU who originally presented to [facility] on 9/12/23 with fatigue, malaise, SOB, and nausea for 2 days duration. On evaluation in the ED at that time he was noted to be in shock requiring pressor support. Imaging showed changes concerning for septic emboli and pneumonia. He was admitted and started on empiric cefepime plus vancomycin while awaiting culture results. 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 He was evaluated by CTS with plans for surgical intervention however on 9/14 he was found with drugs/drug paraphernalia after which the patient left against medical advice. On that same day, he presented to [facility]. Cultures were collected and orders for inpatient admission placed. Unfortunately, the patient once again left against medical advice.       His symptoms progressed and, per chart review, his roommate called 911 at which time he was taken back to [facility] (9/15/23). He was started on pressor support and " "had a chest tube placed on left lung due to a moderate size pneumothorax. Due to his known valvular vegetation, he was transferred to [facility] for CTS evaluation.     Infectious Diseases consulted for "endocarditis with septic emboli. transferred for CTS eval"            Past Medical History:   Diagnosis Date    IVDU (intravenous drug user)        No past surgical history on file.    Review of patient's allergies indicates:  No Known Allergies    Medications:  Medications Prior to Admission   Medication Sig    erythromycin (ROMYCIN) ophthalmic ointment Place a 1/2 inch ribbon of ointment into the lower eyelid.     Antibiotics (From admission, onward)      Start     Stop Route Frequency Ordered    09/16/23 1000  vancomycin (VANCOCIN) 1,000 mg in dextrose 5 % (D5W) 250 mL IVPB (Vial-Mate)         -- IV Every 12 hours (non-standard times) 09/16/23 0548    09/16/23 0930  piperacillin-tazobactam (ZOSYN) 4.5 g in dextrose 5 % in water (D5W) 100 mL IVPB (MB+)         -- IV Every 8 hours (non-standard times) 09/16/23 0819    09/16/23 0435  vancomycin - pharmacy to dose  (vancomycin IVPB (PEDS and ADULTS))        See Hyperspace for full Linked Orders Report.    -- IV pharmacy to manage frequency 09/16/23 0355          Antifungals (From admission, onward)      None          Antivirals (From admission, onward)      None             Immunization History   Administered Date(s) Administered    Tdap 11/08/2018       Family History    None       Social History     Socioeconomic History    Marital status: Single   Tobacco Use    Smoking status: Every Day     Types: Cigarettes   Substance and Sexual Activity    Alcohol use: Not Currently    Drug use: Yes     Types: Heroin    Sexual activity: Never     Review of Systems   Unable to perform ROS: Mental status change     Objective:     Vital Signs (Most Recent):  Temp: 98.9 °F (37.2 °C) (09/16/23 0705)  Pulse: (!) 131 (09/16/23 1005)  Resp: (!) 30 (09/16/23 1005)  BP: (!) 110/53 (09/16/23 " 1005)  SpO2: 95 % (09/16/23 1005) Vital Signs (24h Range):  Temp:  [98.1 °F (36.7 °C)-103 °F (39.4 °C)] 98.9 °F (37.2 °C)  Pulse:  [107-140] 131  Resp:  [17-49] 30  SpO2:  [90 %-100 %] 95 %  BP: ()/(45-81) 110/53     Weight: 72.3 kg (159 lb 6.3 oz)  Body mass index is 21.62 kg/m².    Estimated Creatinine Clearance: 140.6 mL/min (based on SCr of 0.8 mg/dL).     Physical Exam  Vitals reviewed.   Constitutional:       General: He is sleeping.      Appearance: He is well-developed. He is ill-appearing.   HENT:      Head: Normocephalic and atraumatic.      Right Ear: External ear normal.      Left Ear: External ear normal.   Eyes:      Conjunctiva/sclera: Conjunctivae normal.   Neck:      Thyroid: No thyromegaly.      Vascular: JVD present.   Cardiovascular:      Rate and Rhythm: Regular rhythm. Tachycardia present.      Heart sounds: Murmur heard.      Systolic murmur is present with a grade of 4/6.   Pulmonary:      Effort: Pulmonary effort is normal.      Breath sounds: Normal breath sounds. No wheezing or rales.   Abdominal:      General: Bowel sounds are normal.      Palpations: Abdomen is soft. There is no mass.      Tenderness: There is no abdominal tenderness. There is no rebound.   Musculoskeletal:      Right lower leg: Edema present.      Left lower leg: Edema present.   Lymphadenopathy:      Cervical: No cervical adenopathy.   Skin:     General: Skin is warm and dry.   Neurological:      Mental Status: He is lethargic.          Significant Labs: Blood Culture:   Recent Labs   Lab 09/14/23  1600 09/15/23  1945 09/15/23  2332   LABBLOO Gram stain aer bottle: Gram positive cocci in clusters resembling Staph  Results called to and read back by:Estee Melendez RN 09/15/2023  12:30 by  dto  STAPHYLOCOCCUS AUREUS  Refer to previous identification/susceptibility for blood culture   collected 9/14/23  *  Gram stain aer bottle: Gram positive cocci in clusters resembling Staph  Results called to and read back  "by:Estee Melendez RN  09/15/2023  12:30 by  dto  Gram stain lee bottle: Gram positive cocci in clusters resembling Staph  Positive results previously called 09/15/2023  16:30 LB  STAPHYLOCOCCUS AUREUS  Susceptibility pending  * Gram stain aer bottle: Gram positive cocci in clusters resembling Staph  Results called to and read back by: Madison Shipley RN in EMR  09/16/2023  05:57  Gram stain lee bottle: Gram positive cocci in clusters resembling Staph  Positive results previously called No Growth to date     BMP:   Recent Labs   Lab 09/16/23  0525   GLU 79   *   K 4.1      CO2 21*   BUN 21*   CREATININE 0.8   CALCIUM 7.7*     CBC:   Recent Labs   Lab 09/14/23  1600 09/15/23  1945 09/16/23  0525   WBC 25.39* 9.14 15.72*   HGB 10.7* 8.6* 7.8*   HCT 31.9* 25.5* 23.6*    288 223     Respiratory Culture: No results for input(s): "GSRESP", "RESPIRATORYC" in the last 4320 hours.  Urine Culture: No results for input(s): "LABURIN" in the last 4320 hours.  Urine Studies: No results for input(s): "COLORU", "APPEARANCEUA", "PHUR", "SPECGRAV", "PROTEINUA", "GLUCUA", "KETONESU", "BILIRUBINUA", "OCCULTUA", "NITRITE", "UROBILINOGEN", "LEUKOCYTESUR", "RBCUA", "WBCUA", "BACTERIA", "SQUAMEPITHEL", "HYALINECASTS" in the last 4320 hours.    Invalid input(s): "WRIGHTSUR"  Wound Culture: No results for input(s): "LABAERO" in the last 4320 hours.    Significant Imaging: I have reviewed all pertinent imaging results/findings within the past 24 hours.              "

## 2023-09-16 NOTE — ASSESSMENT & PLAN NOTE
Per OSH imaging, found to have mall left hydropneumothorax at the lung base likely due to adjacent necrotic pneumonia. Chest tube placed at OSH ED. Draining bloody fluid.     - CTS consult placed ; follow up in am  - Chest tube care   - HOLD DVT ppx at this time

## 2023-09-17 PROBLEM — I07.9 ENDOCARDITIS OF TRICUSPID VALVE: Status: ACTIVE | Noted: 2023-09-14

## 2023-09-17 LAB
ALBUMIN SERPL BCP-MCNC: 1.6 G/DL (ref 3.5–5.2)
ALP SERPL-CCNC: 276 U/L (ref 55–135)
ALT SERPL W/O P-5'-P-CCNC: 40 U/L (ref 10–44)
ANION GAP SERPL CALC-SCNC: 12 MMOL/L (ref 8–16)
ANION GAP SERPL CALC-SCNC: 7 MMOL/L (ref 8–16)
AST SERPL-CCNC: 38 U/L (ref 10–40)
BASOPHILS # BLD AUTO: 0.01 K/UL (ref 0–0.2)
BASOPHILS # BLD AUTO: 0.02 K/UL (ref 0–0.2)
BASOPHILS NFR BLD: 0.1 % (ref 0–1.9)
BASOPHILS NFR BLD: 0.2 % (ref 0–1.9)
BILIRUB SERPL-MCNC: 0.9 MG/DL (ref 0.1–1)
BUN SERPL-MCNC: 10 MG/DL (ref 6–20)
BUN SERPL-MCNC: 12 MG/DL (ref 6–20)
CALCIUM SERPL-MCNC: 7.9 MG/DL (ref 8.7–10.5)
CALCIUM SERPL-MCNC: 7.9 MG/DL (ref 8.7–10.5)
CHLORIDE SERPL-SCNC: 100 MMOL/L (ref 95–110)
CHLORIDE SERPL-SCNC: 103 MMOL/L (ref 95–110)
CO2 SERPL-SCNC: 19 MMOL/L (ref 23–29)
CO2 SERPL-SCNC: 25 MMOL/L (ref 23–29)
CREAT SERPL-MCNC: 0.8 MG/DL (ref 0.5–1.4)
CREAT SERPL-MCNC: 0.8 MG/DL (ref 0.5–1.4)
DIFFERENTIAL METHOD: ABNORMAL
DIFFERENTIAL METHOD: ABNORMAL
EOSINOPHIL # BLD AUTO: 0 K/UL (ref 0–0.5)
EOSINOPHIL # BLD AUTO: 0.1 K/UL (ref 0–0.5)
EOSINOPHIL NFR BLD: 0.3 % (ref 0–8)
EOSINOPHIL NFR BLD: 0.7 % (ref 0–8)
ERYTHROCYTE [DISTWIDTH] IN BLOOD BY AUTOMATED COUNT: 14.8 % (ref 11.5–14.5)
ERYTHROCYTE [DISTWIDTH] IN BLOOD BY AUTOMATED COUNT: 15.2 % (ref 11.5–14.5)
EST. GFR  (NO RACE VARIABLE): >60 ML/MIN/1.73 M^2
EST. GFR  (NO RACE VARIABLE): >60 ML/MIN/1.73 M^2
GLUCOSE SERPL-MCNC: 102 MG/DL (ref 70–110)
GLUCOSE SERPL-MCNC: 123 MG/DL (ref 70–110)
HCT VFR BLD AUTO: 25.3 % (ref 40–54)
HCT VFR BLD AUTO: 26.3 % (ref 40–54)
HCV AB SERPL QL IA: REACTIVE
HGB BLD-MCNC: 8.2 G/DL (ref 14–18)
HGB BLD-MCNC: 8.7 G/DL (ref 14–18)
HIV 1+2 AB+HIV1 P24 AG SERPL QL IA: NORMAL
IMM GRANULOCYTES # BLD AUTO: 0.08 K/UL (ref 0–0.04)
IMM GRANULOCYTES # BLD AUTO: 0.14 K/UL (ref 0–0.04)
IMM GRANULOCYTES NFR BLD AUTO: 0.6 % (ref 0–0.5)
IMM GRANULOCYTES NFR BLD AUTO: 1 % (ref 0–0.5)
LACTATE SERPL-SCNC: 1.2 MMOL/L (ref 0.5–2.2)
LYMPHOCYTES # BLD AUTO: 1.5 K/UL (ref 1–4.8)
LYMPHOCYTES # BLD AUTO: 1.5 K/UL (ref 1–4.8)
LYMPHOCYTES NFR BLD: 10.2 % (ref 18–48)
LYMPHOCYTES NFR BLD: 11.7 % (ref 18–48)
MAGNESIUM SERPL-MCNC: 1.7 MG/DL (ref 1.6–2.6)
MAGNESIUM SERPL-MCNC: 1.7 MG/DL (ref 1.6–2.6)
MCH RBC QN AUTO: 25.8 PG (ref 27–31)
MCH RBC QN AUTO: 26.2 PG (ref 27–31)
MCHC RBC AUTO-ENTMCNC: 32.4 G/DL (ref 32–36)
MCHC RBC AUTO-ENTMCNC: 33.1 G/DL (ref 32–36)
MCV RBC AUTO: 79 FL (ref 82–98)
MCV RBC AUTO: 80 FL (ref 82–98)
MONOCYTES # BLD AUTO: 0.7 K/UL (ref 0.3–1)
MONOCYTES # BLD AUTO: 0.7 K/UL (ref 0.3–1)
MONOCYTES NFR BLD: 4.7 % (ref 4–15)
MONOCYTES NFR BLD: 5.4 % (ref 4–15)
NEUTROPHILS # BLD AUTO: 10.2 K/UL (ref 1.8–7.7)
NEUTROPHILS # BLD AUTO: 12 K/UL (ref 1.8–7.7)
NEUTROPHILS NFR BLD: 81.4 % (ref 38–73)
NEUTROPHILS NFR BLD: 83.7 % (ref 38–73)
NRBC BLD-RTO: 0 /100 WBC
NRBC BLD-RTO: 0 /100 WBC
PHOSPHATE SERPL-MCNC: 2.8 MG/DL (ref 2.7–4.5)
PLATELET # BLD AUTO: 230 K/UL (ref 150–450)
PLATELET # BLD AUTO: 297 K/UL (ref 150–450)
PMV BLD AUTO: 10.7 FL (ref 9.2–12.9)
PMV BLD AUTO: 10.8 FL (ref 9.2–12.9)
POTASSIUM SERPL-SCNC: 3.5 MMOL/L (ref 3.5–5.1)
POTASSIUM SERPL-SCNC: 3.6 MMOL/L (ref 3.5–5.1)
PROT SERPL-MCNC: 5 G/DL (ref 6–8.4)
RBC # BLD AUTO: 3.18 M/UL (ref 4.6–6.2)
RBC # BLD AUTO: 3.32 M/UL (ref 4.6–6.2)
SODIUM SERPL-SCNC: 132 MMOL/L (ref 136–145)
SODIUM SERPL-SCNC: 134 MMOL/L (ref 136–145)
VANCOMYCIN TROUGH SERPL-MCNC: 7.9 UG/ML (ref 10–22)
WBC # BLD AUTO: 12.52 K/UL (ref 3.9–12.7)
WBC # BLD AUTO: 14.35 K/UL (ref 3.9–12.7)

## 2023-09-17 PROCEDURE — 25000003 PHARM REV CODE 250

## 2023-09-17 PROCEDURE — 36415 COLL VENOUS BLD VENIPUNCTURE: CPT | Performed by: HOSPITALIST

## 2023-09-17 PROCEDURE — 83605 ASSAY OF LACTIC ACID: CPT | Performed by: HOSPITALIST

## 2023-09-17 PROCEDURE — 63600175 PHARM REV CODE 636 W HCPCS: Mod: JZ,JG | Performed by: STUDENT IN AN ORGANIZED HEALTH CARE EDUCATION/TRAINING PROGRAM

## 2023-09-17 PROCEDURE — 87389 HIV-1 AG W/HIV-1&-2 AB AG IA: CPT | Performed by: INTERNAL MEDICINE

## 2023-09-17 PROCEDURE — 80053 COMPREHEN METABOLIC PANEL: CPT

## 2023-09-17 PROCEDURE — 86592 SYPHILIS TEST NON-TREP QUAL: CPT | Performed by: INTERNAL MEDICINE

## 2023-09-17 PROCEDURE — 25000003 PHARM REV CODE 250: Performed by: INTERNAL MEDICINE

## 2023-09-17 PROCEDURE — 80202 ASSAY OF VANCOMYCIN: CPT | Performed by: INTERNAL MEDICINE

## 2023-09-17 PROCEDURE — 86803 HEPATITIS C AB TEST: CPT | Performed by: INTERNAL MEDICINE

## 2023-09-17 PROCEDURE — 63600175 PHARM REV CODE 636 W HCPCS: Performed by: INTERNAL MEDICINE

## 2023-09-17 PROCEDURE — 85025 COMPLETE CBC W/AUTO DIFF WBC: CPT | Mod: 91 | Performed by: HOSPITALIST

## 2023-09-17 PROCEDURE — 87522 HEPATITIS C REVRS TRNSCRPJ: CPT | Mod: 91

## 2023-09-17 PROCEDURE — 20600001 HC STEP DOWN PRIVATE ROOM

## 2023-09-17 PROCEDURE — 83735 ASSAY OF MAGNESIUM: CPT

## 2023-09-17 PROCEDURE — 63600175 PHARM REV CODE 636 W HCPCS

## 2023-09-17 PROCEDURE — 94761 N-INVAS EAR/PLS OXIMETRY MLT: CPT

## 2023-09-17 PROCEDURE — 85025 COMPLETE CBC W/AUTO DIFF WBC: CPT

## 2023-09-17 PROCEDURE — 99233 SBSQ HOSP IP/OBS HIGH 50: CPT | Mod: ,,, | Performed by: INTERNAL MEDICINE

## 2023-09-17 PROCEDURE — 80048 BASIC METABOLIC PNL TOTAL CA: CPT | Mod: XB | Performed by: HOSPITALIST

## 2023-09-17 PROCEDURE — 87040 BLOOD CULTURE FOR BACTERIA: CPT | Mod: 59 | Performed by: INTERNAL MEDICINE

## 2023-09-17 PROCEDURE — 25000003 PHARM REV CODE 250: Performed by: STUDENT IN AN ORGANIZED HEALTH CARE EDUCATION/TRAINING PROGRAM

## 2023-09-17 PROCEDURE — S4991 NICOTINE PATCH NONLEGEND: HCPCS

## 2023-09-17 PROCEDURE — 87077 CULTURE AEROBIC IDENTIFY: CPT | Performed by: INTERNAL MEDICINE

## 2023-09-17 PROCEDURE — 83735 ASSAY OF MAGNESIUM: CPT | Mod: 91 | Performed by: HOSPITALIST

## 2023-09-17 PROCEDURE — 84100 ASSAY OF PHOSPHORUS: CPT

## 2023-09-17 PROCEDURE — 63600175 PHARM REV CODE 636 W HCPCS: Performed by: HOSPITALIST

## 2023-09-17 PROCEDURE — 87522 HEPATITIS C REVRS TRNSCRPJ: CPT | Performed by: STUDENT IN AN ORGANIZED HEALTH CARE EDUCATION/TRAINING PROGRAM

## 2023-09-17 PROCEDURE — 87186 SC STD MICRODIL/AGAR DIL: CPT | Performed by: INTERNAL MEDICINE

## 2023-09-17 PROCEDURE — 99233 PR SUBSEQUENT HOSPITAL CARE,LEVL III: ICD-10-PCS | Mod: ,,, | Performed by: INTERNAL MEDICINE

## 2023-09-17 PROCEDURE — 27000207 HC ISOLATION

## 2023-09-17 RX ORDER — NICOTINE 7MG/24HR
1 PATCH, TRANSDERMAL 24 HOURS TRANSDERMAL DAILY
Status: DISCONTINUED | OUTPATIENT
Start: 2023-09-17 | End: 2023-09-18 | Stop reason: HOSPADM

## 2023-09-17 RX ORDER — HYDROMORPHONE HYDROCHLORIDE 1 MG/ML
1 INJECTION, SOLUTION INTRAMUSCULAR; INTRAVENOUS; SUBCUTANEOUS EVERY 4 HOURS PRN
Status: DISCONTINUED | OUTPATIENT
Start: 2023-09-17 | End: 2023-09-18

## 2023-09-17 RX ADMIN — DIAZEPAM 10 MG: 5 TABLET ORAL at 08:09

## 2023-09-17 RX ADMIN — DIAZEPAM 10 MG: 5 TABLET ORAL at 02:09

## 2023-09-17 RX ADMIN — LORAZEPAM 2 MG: 2 INJECTION INTRAMUSCULAR; INTRAVENOUS at 08:09

## 2023-09-17 RX ADMIN — HYDROMORPHONE HYDROCHLORIDE 1 MG: 1 INJECTION, SOLUTION INTRAMUSCULAR; INTRAVENOUS; SUBCUTANEOUS at 05:09

## 2023-09-17 RX ADMIN — LORAZEPAM 2 MG: 2 INJECTION INTRAMUSCULAR; INTRAVENOUS at 09:09

## 2023-09-17 RX ADMIN — CEFTAROLINE FOSAMIL 600 MG: 600 POWDER, FOR SOLUTION INTRAVENOUS at 02:09

## 2023-09-17 RX ADMIN — ACETAMINOPHEN 650 MG: 325 TABLET ORAL at 08:09

## 2023-09-17 RX ADMIN — PIPERACILLIN AND TAZOBACTAM 4.5 G: 4; .5 INJECTION, POWDER, LYOPHILIZED, FOR SOLUTION INTRAVENOUS; PARENTERAL at 12:09

## 2023-09-17 RX ADMIN — HYDROMORPHONE HYDROCHLORIDE 1 MG: 1 INJECTION, SOLUTION INTRAMUSCULAR; INTRAVENOUS; SUBCUTANEOUS at 08:09

## 2023-09-17 RX ADMIN — VANCOMYCIN HYDROCHLORIDE 1000 MG: 1 INJECTION, POWDER, LYOPHILIZED, FOR SOLUTION INTRAVENOUS at 10:09

## 2023-09-17 RX ADMIN — LORAZEPAM 2 MG: 2 INJECTION INTRAMUSCULAR; INTRAVENOUS at 03:09

## 2023-09-17 RX ADMIN — ACETAMINOPHEN 650 MG: 325 TABLET ORAL at 06:09

## 2023-09-17 RX ADMIN — SODIUM CHLORIDE, POTASSIUM CHLORIDE, SODIUM LACTATE AND CALCIUM CHLORIDE 1000 ML: 600; 310; 30; 20 INJECTION, SOLUTION INTRAVENOUS at 09:09

## 2023-09-17 RX ADMIN — LORAZEPAM 2 MG: 2 INJECTION INTRAMUSCULAR; INTRAVENOUS at 02:09

## 2023-09-17 RX ADMIN — NICOTINE 1 PATCH: 7 PATCH, EXTENDED RELEASE TRANSDERMAL at 08:09

## 2023-09-17 RX ADMIN — PIPERACILLIN AND TAZOBACTAM 4.5 G: 4; .5 INJECTION, POWDER, LYOPHILIZED, FOR SOLUTION INTRAVENOUS; PARENTERAL at 08:09

## 2023-09-17 RX ADMIN — DAPTOMYCIN 580 MG: 350 INJECTION, POWDER, LYOPHILIZED, FOR SOLUTION INTRAVENOUS at 02:09

## 2023-09-17 RX ADMIN — CEFTAROLINE FOSAMIL 600 MG: 600 POWDER, FOR SOLUTION INTRAVENOUS at 11:09

## 2023-09-17 NOTE — RESIDENT HANDOFF
Handoff     Primary Team: Networked reference to record [facility]  Room Number: 6090/6090 A     Patient Name: Carlito Rosario MRN: [id number]     Date of Birth: [id number] Allergies: Patient has no known allergies.     Age: 28 y.o. Admit Date: 9/16/2023     Sex: male  BMI: Body mass index is 21.62 kg/m².     Code Status: Full Code        Illness Level (current clinical status): Watcher - No    Reason for Admission: Hydropneumothorax    Brief HPI (pertinent PMH and diagnosis or differential diagnosis): 28-year-old male with medical history significant for IV drug use, pulmonary septic emboli hydropneumothorax and infective endocarditis presents as transfer from [place] emergency department for Cardiothoracic surgery evaluation and VATS consideration.  Per chart review, patient was being treated for septic shock, infective endocarditis and respiratory failure at [facility] where he reportedly signed out AMA.  During his stay at OSH he required vasopressors and BiPAP on admission. Imaging at that time showed concern for septic emboli and left hydropneumothorax lung base likely adjacent to necrotic pneumonia. TTE revealed EF of 55-60 %, moderate tricuspid regurgitation and a moderate-size vegetation or mass on tricuspid valve. He was treated with vancomycin and Zosyn with plan for VATS. He was reportedly caught using some Illicit substance (thought to be heroin) and syringes were confiscated. He subsequently chose to leave A. Per chart review, he stated medical staff at [facility] were rude to him and he was not happy with his care.     On presentation to OSH ED, he was noted to be afebrile, tachycardic and hypotensive to 105/64. He had leukocytosis with WBC of 25.3,  and lactate 1.6. Rapid MRSA ID from blood culture was positive. Chest tube was placed and he was transferred to [facility] for CTS evaluation.     Procedure Date: chest tube placed on 9/15    Hospital Course (updated, brief assessment by system or  problem, significant events): Chest tube examined, set to water seal. CT chest with loculated L hydroPTX of moderate size, pulm nodules likely septic emboli, ground-glass and solid opacities likely multifocal PNA, and signs of bronchitis. Daily CXR ordered. Levophed stopped on 9/16 am. Discussed with CTS, agree PTX resolving. No plans for surgical intervention regarding endocarditis.  On ciwa with valium scheduled and ativan prn.       Tasks (specific, using if-then statements):     - Clamp chest tube  - F/u echo  - repeat blood cultures if still bacteremic  - F/u ID, CTS recs  - monitor chest tube output   - wean benzos/opioids as able    Contingency Plan (special circumstances anticipated and plan): none    Estimated Discharge Date: 9/21    Discharge Disposition:  TBD by PT    Mentored By: Dr. Quinones

## 2023-09-17 NOTE — NURSING
End of shift note    New transferred to floor-1400  AAOx4  RA  Ativan x1  Dilaudid x1  Chest tube in place  Psyc consulted  VSS  NADN- safety checks performed  Call light in reach

## 2023-09-17 NOTE — PROGRESS NOTES
[facility]  General Surgery  Progress Note    Subjective:     History of Present Illness:  No notes on file    Post-Op Info:  * No surgery found *         Interval History:   NAEON  CT obtained showing loculated hydropneumothorax  Pulled back chest tube today about 2cm  Still tachy and febrile  Asking to go outside to smoke cigarettes    Medications:  Continuous Infusions:  Scheduled Meds:   diazePAM  10 mg Oral TID    Followed by    [START ON 9/19/2023] diazePAM  10 mg Oral BID    enoxparin  40 mg Subcutaneous Q24H (prophylaxis, 1700)    nicotine  1 patch Transdermal Daily    piperacillin-tazobactam (Zosyn) IV (PEDS and ADULTS) (extended infusion is not appropriate)  4.5 g Intravenous Q8H    vancomycin (VANCOCIN) IV (PEDS and ADULTS)  15 mg/kg Intravenous Q12H     PRN Meds:acetaminophen, dextrose 10%, dextrose 10%, glucagon (human recombinant), glucose, glucose, HYDROmorphone, lorazepam, naloxone, sodium chloride 0.9%, Pharmacy to dose Vancomycin consult **AND** vancomycin - pharmacy to dose     Review of patient's allergies indicates:  No Known Allergies  Objective:     Vital Signs (Most Recent):  Temp: (!) 102.7 °F (39.3 °C) (09/17/23 0613)  Pulse: (!) 121 (09/17/23 0701)  Resp: 14 (09/17/23 0701)  BP: (!) 93/50 (09/17/23 0701)  SpO2: 96 % (09/17/23 0701) Vital Signs (24h Range):  Temp:  [98 °F (36.7 °C)-102.7 °F (39.3 °C)] 102.7 °F (39.3 °C)  Pulse:  [100-224] 121  Resp:  [14-51] 14  SpO2:  [92 %-100 %] 96 %  BP: ()/(50-64) 93/50     Weight: 72.3 kg (159 lb 6.3 oz)  Body mass index is 21.62 kg/m².    Intake/Output - Last 3 Shifts         09/15 0700 09/16 0659 09/16 0700 09/17 0659 09/17 0700 09/18 0659    P.O.   0    I.V. (mL/kg) 7.7 (0.1) 12 (0.2)     IV Piggyback  782.3     Total Intake(mL/kg) 7.7 (0.1) 794.3 (11) 0 (0)    Urine (mL/kg/hr) 650 4575 (2.6) 150 (1.8)    Chest Tube  25     Total Output 650 4600 150    Net -642.3 -3805.7 -150                    Physical  Exam  Constitutional:       General: He is not in acute distress.     Appearance: Normal appearance.   HENT:      Head: Normocephalic and atraumatic.      Mouth/Throat:      Mouth: Mucous membranes are moist.      Comments: Very poor dentition, foul smelling breath  Eyes:      Pupils: Pupils are equal, round, and reactive to light.   Cardiovascular:      Rate and Rhythm: Normal rate.   Pulmonary:      Effort: Pulmonary effort is normal. No respiratory distress.      Comments: Nasal cannulae  Abdominal:      General: Abdomen is flat. There is no distension.      Palpations: Abdomen is soft.   Musculoskeletal:         General: Normal range of motion.      Cervical back: Normal range of motion.   Skin:     General: Skin is warm and dry.   Neurological:      General: No focal deficit present.      Mental Status: He is alert and oriented to person, place, and time.   Psychiatric:         Mood and Affect: Mood normal.         Behavior: Behavior normal.          Significant Labs:  I have reviewed all pertinent lab results within the past 24 hours.    Significant Diagnostics:  I have reviewed all pertinent imaging results/findings within the past 24 hours.    Assessment/Plan:     Pneumonia of left lower lobe due to methicillin-resistant Staphylococcus aureus (MRSA)  Patient is a 28y M w/ hx of IVDU who is most recently being treated for infective endocarditis at an OSH who presents as transfer from OSH with chest tube for evaluation by CTS for possible VATS. CT on 9/16: loculated hydropneumothorax.    Recommend consult to IR to place 14Fr pigtail into that posterior/inferior pocket.      Chest tube in place, no air leak, thin SS ouptut.  Pulled back chest tube 2 cm on 9/17.   Pneumothorax appears to have resolved with CT placement.   Recommend chest tube to water seal, daily CXR.   Likely will be able to remove in a few days.   No plans for surgical intervention at this time.        Catherine Del Toro MD  General  Surgery  [facility] - Cardiac Medical [facility]

## 2023-09-17 NOTE — PROGRESS NOTES
Therapy with Vancomycin complete and/or consult discontinued by provider.  Pharmacy will sign off, please re-consult as needed.   Thank you.

## 2023-09-17 NOTE — ASSESSMENT & PLAN NOTE
Patient with noted tricuspid vegetation on TTE at OSH and septic emboli in the setting of IVDU.     - Continue dapto/ceftaroline per ID for salvage therapy  - f/u persistently +blood cx  - no surgery per CTS at this time  - f/u echo

## 2023-09-17 NOTE — ASSESSMENT & PLAN NOTE
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 General surgery following patient, advised 14Fr pigtail into that posterior/inferior pocket, no surgical plans at this time. Currently he does not seem to have any new areas of metastatic foci of infection on exam.      Recommendations    -Given blood cultures positive since 9/14, subtherapeutic vancomycin levels, and high burden of bacteremia, switch to salvage therapy today with daptomycin and Ceftaroline rather than waiting.     -Repeat blood cultures q48 hours until microbiological clearance    - Not an OPAT candidate given IVDU hx, will need a facility transfer upon discharge    - HIV negative, Hep C Ab+ , check PCR, if positive will need outpatient hepatology referral to initiate therapy    - Discussed ID management with patients father at the bedside and  MICU team      "

## 2023-09-17 NOTE — PROGRESS NOTES
"[facility] Telemetry Stepdown  Infectious Disease  Progress Note    Patient Name: Carlito Rosario  MRN: [id number]  Admission Date: 9/16/2023  Length of Stay: 1 days  Attending Physician: Tuan Adams  Primary Care Provider: Nereida, Primary Doctor    Isolation Status: Contact  Assessment/Plan:      Pulmonary  * Hydropneumothorax  · See as above/below    Pneumonia of left lower lobe due to methicillin-resistant Staphylococcus aureus (MRSA)  · See as above/below      Cardiac/Vascular  Endocarditis of tricuspid valve  Seen on echocardiogram performed at [facility]. No dimension on chart.  · See as above/below for plan      ID  MRSA bacteremia  Secondary to IVDU.  · See septic shock.    Septic shock  Sepsis from complicated MRSA bacteremia with TV native valve endocarditis in a young IVDU patient, off vasopressor support, OSH TTE noted "moderate" sized vegetation, evidence of septic emboli, pneumonia and moderate loculated left hydropneumothorax s/p chest tube placed     Was initially being considered for VATS at OSH, now s/p chest tube for R hydropneumothorax 9/15. General surgery following patient, advised 14Fr pigtail into that posterior/inferior pocket, no surgical plans at this time. Currently he does not seem to have any new areas of metastatic foci of infection on exam.      Recommendations    -Given blood cultures positive since 9/14, subtherapeutic vancomycin levels, and high burden of bacteremia, switch to salvage therapy today with daptomycin and Ceftaroline rather than waiting.     -Repeat blood cultures q48 hours until microbiological clearance    - Not an OPAT candidate given IVDU hx, will need a facility transfer upon discharge    - HIV negative, Hep C Ab+ , check PCR, if positive will need outpatient hepatology referral to initiate therapy    - Discussed ID management with patients father at the bedside and  MICU team            Anticipated Disposition: TBD    Thank you for your consult. I will " "follow-up with patient. Please contact us if you have any additional questions.    Usman Aoyn MD  Infectious Disease  [facility] - Telemetry Stepdown    Subjective:     Principal Problem:Hydropneumothorax    HPI: A 28-year-old homeless man with active IVDU who originally presented to [facility] on 9/12/23 with fatigue, malaise, SOB, and nausea for 2 days duration. On evaluation in the ED at that time he was noted to be in shock requiring pressor support. Imaging showed changes concerning for septic emboli and pneumonia. He was admitted and started on empiric cefepime plus vancomycin while awaiting culture results. 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 He was evaluated by CTS with plans for surgical intervention however on 9/14 he was found with drugs/drug paraphernalia after which the patient left against medical advice. On that same day, he presented to [facility]. Cultures were collected and orders for inpatient admission placed. Unfortunately, the patient once again left against medical advice.       His symptoms progressed and, per chart review, his roommate called 911 at which time he was taken back to [facility] (9/15/23). He was started on pressor support and had a chest tube placed on left lung due to a moderate size pneumothorax. Due to his known valvular vegetation, he was transferred to [facility] for CTS evaluation.     Infectious Diseases consulted for "endocarditis with septic emboli. transferred for CTS eval"          Interval History: off pressors, persistent fevers    Review of Systems   Constitutional:  Positive for chills and fatigue. Negative for activity change and fever.   HENT:  Negative for trouble swallowing.    Respiratory:  Negative for cough, chest tightness and shortness of breath.    Cardiovascular:  Negative for chest pain. "   Gastrointestinal:  Negative for vomiting.   Psychiatric/Behavioral:  Negative for confusion.        Objective:     Vital Signs (Most Recent):  Temp: (!) 102.7 °F (39.3 °C) (09/17/23 0613)  Pulse: (!) 112 (09/17/23 0901)  Resp: (!) 44 (09/17/23 0901)  BP: (!) 102/57 (09/17/23 0901)  SpO2: 98 % (09/17/23 0901) Vital Signs (24h Range):  Temp:  [98 °F (36.7 °C)-102.7 °F (39.3 °C)] 102.7 °F (39.3 °C)  Pulse:  [100-224] 112  Resp:  [14-51] 44  SpO2:  [92 %-100 %] 98 %  BP: ()/(50-64) 102/57     Weight: 72.3 kg (159 lb 6.3 oz)  Body mass index is 21.62 kg/m².    Estimated Creatinine Clearance: 140.6 mL/min (based on SCr of 0.8 mg/dL).     Physical Exam  Constitutional:       Appearance: He is ill-appearing and diaphoretic.   HENT:      Head: Normocephalic and atraumatic.      Nose: Nose normal.      Mouth/Throat:      Mouth: Mucous membranes are moist.      Pharynx: Oropharynx is clear.   Eyes:      General: No scleral icterus.     Conjunctiva/sclera: Conjunctivae normal.   Cardiovascular:      Pulses: Normal pulses.   Pulmonary:      Effort: Pulmonary effort is normal.      Breath sounds: Rales present.      Comments: L chest tube to water seal  Abdominal:      General: Bowel sounds are normal.      Tenderness: There is no guarding or rebound.   Musculoskeletal:         General: No tenderness or deformity.      Cervical back: Normal range of motion and neck supple.   Skin:     General: Skin is warm.      Coloration: Skin is not jaundiced.      Findings: Bruising present.   Neurological:      Mental Status: He is alert and oriented to person, place, and time. Mental status is at baseline.          Significant Labs:   Microbiology Results (last 7 days)       Procedure Component Value Units Date/Time    Blood culture [[id number]] Collected: 09/17/23 0635    Order Status: Sent Specimen: Blood from Peripheral, Wrist, Left Updated: 09/17/23 0651    Blood culture [[id number]] Collected: 09/17/23 0636    Order Status:  Sent Specimen: Blood from Peripheral, Antecubital, Left Updated: 09/17/23 0651          All pertinent labs within the past 24 hours have been reviewed.  Recent Lab Results         09/17/23  0304        Albumin 1.6       Alkaline Phosphatase 276       ALT 40       Anion Gap 7       AST 38       Baso # 0.02       Basophil % 0.2       BILIRUBIN TOTAL 0.9  Comment: For infants and newborns, interpretation of results should be based  on gestational age, weight and in agreement with clinical  observations.    Premature Infant recommended reference ranges:  Up to 24 hours.............<8.0 mg/dL  Up to 48 hours............<12.0 mg/dL  3-5 days..................<15.0 mg/dL  6-29 days.................<15.0 mg/dL         BUN 12       Calcium 7.9       Chloride 100       CO2 25       Creatinine 0.8       Differential Method Automated       eGFR >60.0       Eos # 0.1       Eosinophil % 0.7       Glucose 123       Gran # (ANC) 10.2       Gran % 81.4       Hematocrit 25.3       Hemoglobin 8.2       Hepatitis C Ab Reactive  Comment: Presumptive evidence of antibodies to HCV; recommend   supplemental testing HCV RNA Quantitative by PCR   ([id number]) if clinically indicated.         HIV 1/2 Ag/Ab Non-reactive       Immature Grans (Abs) 0.08  Comment: Mild elevation in immature granulocytes is non specific and   can be seen in a variety of conditions including stress response,   acute inflammation, trauma and pregnancy. Correlation with other   laboratory and clinical findings is essential.         Immature Granulocytes 0.6       Lymph # 1.5       Lymph % 11.7       Magnesium  1.7       MCH 25.8       MCHC 32.4       MCV 80       Mono # 0.7       Mono % 5.4       MPV 10.7       nRBC 0       Phosphorus 2.8       Platelets 230       Potassium 3.6       PROTEIN TOTAL 5.0       RBC 3.18       RDW 14.8       Sodium 132       WBC 12.52               Significant Imaging: I have reviewed all pertinent imaging results/findings within the  past 24 hours.

## 2023-09-17 NOTE — PROGRESS NOTES
[facility] - Telemetry Stepdown  Critical Care Medicine  Progress Note    Patient Name: Carlito Rosario  MRN: [id number]  Admission Date: 9/16/2023  Hospital Length of Stay: 1 days  Code Status: Full Code  Attending Provider: Tuan Adams  Primary Care Provider: Nereida, Primary Doctor   Principal Problem: Hydropneumothorax    Subjective:     HPI:  28-year-old male with medical history significant for IV drug use, pulmonary septic emboli hydropneumothorax and infective endocarditis presents as transfer from [place] emergency department for Cardiothoracic surgery evaluation and VATS consideration.  Per chart review, patient was being treated for septic shock, infective endocarditis and respiratory failure at [facility] where he reportedly signed out AMA.  During his stay at OSH he required vasopressors and BiPAP on admission. Imaging at that time showed concern for septic emboli and left hydropneumothorax lung base likely adjacent to necrotic pneumonia. TTE revealed EF of 55-60 %, moderate tricuspid regurgitation and a moderate-size vegetation or mass on tricuspid valve. He was treated with vancomycin and Zosyn with plan for VATS. He was reportedly caught using some Illicit substance (thought to be heroin) and syringes were confiscated. He subsequently chose to leave [place]. Per chart review, he stated medical staff at [facility] were rude to him and he was not happy with his care.    On presentation to OSH ED, he was noted to be afebrile, tachycardic and hypotensive to 105/64. He had leukocytosis with WBC of 25.3,  and lactate 1.6. Rapid MRSA ID from blood culture was positive. Chest tube was placed and he was transferred to [facility] for CTS evaluation.      Hospital/ICU Course:  Chest tube clamped on 9/17, minimal output of 25 cc recorded overnight. ID changed abx to ceftaroline and dapto on 9/17 for salvage therapy. Still fevering, blood cx still positive for MRSA as of 9/15, repeat cultures drawn on 9/17.  No surgical intervention per CTS. Found to be Hep C+, RNA PCR pending.       Interval History/Significant Events: abx changed to dapto and ceftaroline, persistent bacteremia, subtherapeutic vanc level. CT clamped after 25 cc output.     Review of Systems   Respiratory:  Negative for shortness of breath.    Cardiovascular:  Negative for chest pain.   Psychiatric/Behavioral:  Negative for confusion.      Objective:     Vital Signs (Most Recent):  Temp: 99 °F (37.2 °C) (09/17/23 1101)  Pulse: (!) 112 (09/17/23 1359)  Resp: 18 (09/17/23 1359)  BP: 121/62 (09/17/23 1359)  SpO2: (!) 92 % (09/17/23 1359) Vital Signs (24h Range):  Temp:  [98 °F (36.7 °C)-102.7 °F (39.3 °C)] 99 °F (37.2 °C)  Pulse:  [100-224] 112  Resp:  [7-51] 18  SpO2:  [92 %-100 %] 92 %  BP: ()/(50-64) 121/62   Weight: 72.3 kg (159 lb 6.3 oz)  Body mass index is 21.62 kg/m².      Intake/Output Summary (Last 24 hours) at 9/17/2023 1410  Last data filed at 9/17/2023 1201  Gross per 24 hour   Intake 746.29 ml   Output 3825 ml   Net -3078.71 ml          Physical Exam  Constitutional:       Appearance: He is ill-appearing.      Comments: somnolence   HENT:      Head: Normocephalic.      Mouth/Throat:      Pharynx: No oropharyngeal exudate.   Eyes:      General: No scleral icterus.  Cardiovascular:      Rate and Rhythm: Tachycardia present.      Heart sounds: Normal heart sounds.   Pulmonary:      Breath sounds: Normal breath sounds.      Comments: Tachypnic periodically   Abdominal:      General: Abdomen is flat.      Palpations: Abdomen is soft.   Musculoskeletal:         General: Normal range of motion.      Cervical back: Normal range of motion. No tenderness.   Lymphadenopathy:      Cervical: No cervical adenopathy.   Skin:     General: Skin is warm and dry.      Coloration: Skin is not jaundiced.      Findings: No bruising.            Vents:     Lines/Drains/Airways       Drain  Duration                  Chest Tube 09/16/23 Tube - 1 Left 1 day     "Male External Urinary Catheter 09/16/23 0444 1 day              Peripheral Intravenous Line  Duration                  Peripheral IV - Single Lumen 09/15/23 1940 20 G Right Antecubital 1 day                  Significant Labs:    CBC/Anemia Profile:  Recent Labs   Lab 09/15/23  1945 09/16/23  0525 09/17/23  0304   WBC 9.14 15.72* 12.52   HGB 8.6* 7.8* 8.2*   HCT 25.5* 23.6* 25.3*    223 230   MCV 77* 78* 80*   RDW 14.9* 15.2* 14.8*        Chemistries:  Recent Labs   Lab 09/15/23  1945 09/16/23  0525 09/17/23  0304   * 135* 132*   K 4.2 4.1 3.6   CL 99 104 100   CO2 27 21* 25   BUN 30* 21* 12   CREATININE 1.02 0.8 0.8   CALCIUM 8.0* 7.7* 7.9*   ALBUMIN 2.4* 1.5* 1.6*   PROT 5.7* 4.8* 5.0*   BILITOT 1.2 1.9* 0.9   ALKPHOS 296* 307* 276*   ALT 28 50* 40   AST 32 75* 38   MG  --   --  1.7   PHOS  --   --  2.8       Blood Culture:   Recent Labs   Lab 09/15/23  1945 09/15/23  2332   LABBLOO Gram stain aer bottle: Gram positive cocci in clusters resembling Staph  Results called to and read back by: Madison Shipley RN in EMR  09/16/2023  05:57  Gram stain lee bottle: Gram positive cocci in clusters resembling Staph  Positive results previously called  STAPHYLOCOCCUS AUREUS  Refer to previous identification/susceptibility for blood culture   collected on 9/14/23  * Gram stain aer bottle: Gram positive cocci in clusters resembling Staph  Positive results previously called  STAPHYLOCOCCUS AUREUS  Refer to previous identification/susceptibility for blood culture   collected on 9/14/23  *       Significant Imaging:  I have reviewed all pertinent imaging results/findings within the past 24 hours.      ABG  No results for input(s): "PH", "PO2", "PCO2", "HCO3", "BE" in the last 168 hours.  Assessment/Plan:     Psychiatric  IV drug user  Known Heroin use. Received lorazepam in the ED.     - Monitor for signs of withdrawal     Pulmonary  * Hydropneumothorax  Per OSH imaging, found to have mall left hydropneumothorax " at the lung base likely due to adjacent necrotic pneumonia. Chest tube placed at OSH ED. Draining bloody fluid.     - CTS: no surgical intervention  - Chest tube care, clamped on 9/17  - IR consulted for pigtail    Pneumonia of left lower lobe due to methicillin-resistant Staphylococcus aureus (MRSA)  Likely necrotic     Cardiac/Vascular  Endocarditis of tricuspid valve  Patient with noted tricuspid vegetation on TTE at OSH and septic emboli in the setting of IVDU.     - Continue dapto/ceftaroline per ID for salvage therapy  - f/u persistently +blood cx  - no surgery per CTS at this time  - f/u echo      ID  MRSA bacteremia  Follow cultures    Sepsis  This patient does have evidence of infective focus  My overall impression is sepsis.  Source: Blood  Antibiotics given-     Antibiotics (72h ago, onward)      Start     Stop Route Frequency Ordered    09/17/23 1330  DAPTOmycin (CUBICIN) 580 mg in sodium chloride 0.9% SolP 50 mL IVPB         -- IV Every 24 hours (non-standard times) 09/17/23 1225    09/17/23 1330  ceftaroline fosamiL (TEFLARO) 600 mg in dextrose 5 % in water (D5W) 50 mL IVPB (MB+)         -- IV Every 8 hours (non-standard times) 09/17/23 1225          Latest lactate reviewed-  Recent Labs   Lab 09/14/23  1600 09/15/23  1839 09/15/23  2041   LACTATE 1.6 0.8 0.6     Organ dysfunction indicated by Acute respiratory failure    Fluid challenge Actual Body weight- Patient will receive 30ml/kg actual body weight to calculate fluid bolus for treatment of septic shock.     Post- resuscitation assessment Yes Perfusion exam was performed within 6 hours of septic shock presentation after bolus shows Adequate tissue perfusion assessed by non-invasive monitoring       Will Not start Pressors- Levophed for MAP of 65  Source control achieved by:  Antibiotics    Noted positive MRSA rapid ID at OSH. Likely bacteremia in the setting of known IVDU.     - stopped vanc on 9/17. Started dapto and ceftaroline for salvage  therapy  - F/U blood cultures. Persistent bacteremia         Critical Care Daily Checklist:    A: Awake: RASS Goal/Actual Goal: RASS Goal: 0-->alert and calm  Actual:     B: Spontaneous Breathing Trial Performed?     C: SAT & SBT Coordinated?  NA                    D: Delirium: CAM-ICU Overall CAM-ICU: Negative   E: Early Mobility Performed? No   F: Feeding Goal:    Status:     Current Diet Order   Procedures    Diet Adult Regular (IDDSI Level 7)      AS: Analgesia/Sedation Diazepam, ativan, IV dilaudid   T: Thromboembolic Prophylaxis lovenox   H: HOB > 300 Yes   U: Stress Ulcer Prophylaxis (if needed) NA   G: Glucose Control At goal   B: Bowel Function     I: Indwelling Catheter (Lines & Alvarado) Necessity CT, PIV, R IJ   D: De-escalation of Antimicrobials/Pharmacotherapies Changed to dapto/ceftaroline    Plan for the day/ETD stepdown    Code Status:  Family/Goals of Care: Full Code  Updated his mother via phone       Critical secondary to Patient has a condition that poses threat to life and bodily function: Severe Respiratory Distress      Critical care was time spent personally by me on the following activities: development of treatment plan with patient or surrogate and bedside caregivers, discussions with consultants, evaluation of patient's response to treatment, examination of patient, ordering and performing treatments and interventions, ordering and review of laboratory studies, ordering and review of radiographic studies, pulse oximetry, re-evaluation of patient's condition. This critical care time did not overlap with that of any other provider or involve time for any procedures.     Mary Zelaya MD  Critical Care Medicine  [facility] - Telemetry Stepdown

## 2023-09-17 NOTE — SUBJECTIVE & OBJECTIVE
Interval History:   NAEON  CT obtained showing loculated hydropneumothorax  Pulled back chest tube today about 2cm  Still tachy and febrile  Asking to go outside to smoke cigarettes    Medications:  Continuous Infusions:  Scheduled Meds:   diazePAM  10 mg Oral TID    Followed by    [START ON 9/19/2023] diazePAM  10 mg Oral BID    enoxparin  40 mg Subcutaneous Q24H (prophylaxis, 1700)    nicotine  1 patch Transdermal Daily    piperacillin-tazobactam (Zosyn) IV (PEDS and ADULTS) (extended infusion is not appropriate)  4.5 g Intravenous Q8H    vancomycin (VANCOCIN) IV (PEDS and ADULTS)  15 mg/kg Intravenous Q12H     PRN Meds:acetaminophen, dextrose 10%, dextrose 10%, glucagon (human recombinant), glucose, glucose, HYDROmorphone, lorazepam, naloxone, sodium chloride 0.9%, Pharmacy to dose Vancomycin consult **AND** vancomycin - pharmacy to dose     Review of patient's allergies indicates:  No Known Allergies  Objective:     Vital Signs (Most Recent):  Temp: (!) 102.7 °F (39.3 °C) (09/17/23 0613)  Pulse: (!) 121 (09/17/23 0701)  Resp: 14 (09/17/23 0701)  BP: (!) 93/50 (09/17/23 0701)  SpO2: 96 % (09/17/23 0701) Vital Signs (24h Range):  Temp:  [98 °F (36.7 °C)-102.7 °F (39.3 °C)] 102.7 °F (39.3 °C)  Pulse:  [100-224] 121  Resp:  [14-51] 14  SpO2:  [92 %-100 %] 96 %  BP: ()/(50-64) 93/50     Weight: 72.3 kg (159 lb 6.3 oz)  Body mass index is 21.62 kg/m².    Intake/Output - Last 3 Shifts         09/15 0700  09/16 0659 09/16 0700  09/17 0659 09/17 0700  09/18 0659    P.O.   0    I.V. (mL/kg) 7.7 (0.1) 12 (0.2)     IV Piggyback  782.3     Total Intake(mL/kg) 7.7 (0.1) 794.3 (11) 0 (0)    Urine (mL/kg/hr) 650 4575 (2.6) 150 (1.8)    Chest Tube  25     Total Output 650 4600 150    Net -642.3 -3805.7 -150                    Physical Exam  Constitutional:       General: He is not in acute distress.     Appearance: Normal appearance.   HENT:      Head: Normocephalic and atraumatic.      Mouth/Throat:      Mouth: Mucous  membranes are moist.      Comments: Very poor dentition, foul smelling breath  Eyes:      Pupils: Pupils are equal, round, and reactive to light.   Cardiovascular:      Rate and Rhythm: Normal rate.   Pulmonary:      Effort: Pulmonary effort is normal. No respiratory distress.      Comments: Nasal cannulae  Abdominal:      General: Abdomen is flat. There is no distension.      Palpations: Abdomen is soft.   Musculoskeletal:         General: Normal range of motion.      Cervical back: Normal range of motion.   Skin:     General: Skin is warm and dry.   Neurological:      General: No focal deficit present.      Mental Status: He is alert and oriented to person, place, and time.   Psychiatric:         Mood and Affect: Mood normal.         Behavior: Behavior normal.          Significant Labs:  I have reviewed all pertinent lab results within the past 24 hours.    Significant Diagnostics:  I have reviewed all pertinent imaging results/findings within the past 24 hours.

## 2023-09-17 NOTE — CLINICAL REVIEW
IP Sepsis Screen (most recent)       Sepsis Screen (IP) - 09/17/23 1430       Is the patient's history or complaint suggestive of a possible infection? Yes  -    Are there at least two of the following signs and symptoms present? Yes  -    Sepsis signs/symptoms - Tachycardia Tachycardia     >90  -    Sepsis signs/symptoms - WBC WBC < 4,000 or WBC > 12,000  -    Are any of the following organ dysfunction criteria present and not considered to be due to a chronic condition? Yes  -    Organ Dysfunction Criteria - O2 O2 Saturation < 95% on room air  -    Initiate Sepsis Protocol No  -JM    Reason sepsis not considered Pt. receiving appropriate management  -              User Key  (r) = Recorded By, (t) = Taken By, (c) = Cosigned By      Initials Name    So Segundo RN

## 2023-09-17 NOTE — NURSING
Patient attempting to pull out chest tube so he can leave AMA. Patient educated on risks of pulling chest tube out. Patient AAOx4    Patient's sister reporting that patient is suicidal.     MD was notified. Will continue plan of care.    1800- MD at bedside

## 2023-09-17 NOTE — SUBJECTIVE & OBJECTIVE
Interval History/Significant Events: abx changed to dapto and ceftaroline, persistent bacteremia, subtherapeutic vanc level. CT clamped after 25 cc output.     Review of Systems   Respiratory:  Negative for shortness of breath.    Cardiovascular:  Negative for chest pain.   Psychiatric/Behavioral:  Negative for confusion.      Objective:     Vital Signs (Most Recent):  Temp: 99 °F (37.2 °C) (09/17/23 1101)  Pulse: (!) 112 (09/17/23 1359)  Resp: 18 (09/17/23 1359)  BP: 121/62 (09/17/23 1359)  SpO2: (!) 92 % (09/17/23 1359) Vital Signs (24h Range):  Temp:  [98 °F (36.7 °C)-102.7 °F (39.3 °C)] 99 °F (37.2 °C)  Pulse:  [100-224] 112  Resp:  [7-51] 18  SpO2:  [92 %-100 %] 92 %  BP: ()/(50-64) 121/62   Weight: 72.3 kg (159 lb 6.3 oz)  Body mass index is 21.62 kg/m².      Intake/Output Summary (Last 24 hours) at 9/17/2023 1410  Last data filed at 9/17/2023 1201  Gross per 24 hour   Intake 746.29 ml   Output 3825 ml   Net -3078.71 ml          Physical Exam  Constitutional:       Appearance: He is ill-appearing.      Comments: somnolence   HENT:      Head: Normocephalic.      Mouth/Throat:      Pharynx: No oropharyngeal exudate.   Eyes:      General: No scleral icterus.  Cardiovascular:      Rate and Rhythm: Tachycardia present.      Heart sounds: Normal heart sounds.   Pulmonary:      Breath sounds: Normal breath sounds.      Comments: Tachypnic periodically   Abdominal:      General: Abdomen is flat.      Palpations: Abdomen is soft.   Musculoskeletal:         General: Normal range of motion.      Cervical back: Normal range of motion. No tenderness.   Lymphadenopathy:      Cervical: No cervical adenopathy.   Skin:     General: Skin is warm and dry.      Coloration: Skin is not jaundiced.      Findings: No bruising.            Vents:     Lines/Drains/Airways       Drain  Duration                  Chest Tube 09/16/23 Tube - 1 Left 1 day    Male External Urinary Catheter 09/16/23 0444 1 day              Peripheral  Intravenous Line  Duration                  Peripheral IV - Single Lumen 09/15/23 1940 20 G Right Antecubital 1 day                  Significant Labs:    CBC/Anemia Profile:  Recent Labs   Lab 09/15/23  1945 09/16/23  0525 09/17/23  0304   WBC 9.14 15.72* 12.52   HGB 8.6* 7.8* 8.2*   HCT 25.5* 23.6* 25.3*    223 230   MCV 77* 78* 80*   RDW 14.9* 15.2* 14.8*        Chemistries:  Recent Labs   Lab 09/15/23  1945 09/16/23  0525 09/17/23  0304   * 135* 132*   K 4.2 4.1 3.6   CL 99 104 100   CO2 27 21* 25   BUN 30* 21* 12   CREATININE 1.02 0.8 0.8   CALCIUM 8.0* 7.7* 7.9*   ALBUMIN 2.4* 1.5* 1.6*   PROT 5.7* 4.8* 5.0*   BILITOT 1.2 1.9* 0.9   ALKPHOS 296* 307* 276*   ALT 28 50* 40   AST 32 75* 38   MG  --   --  1.7   PHOS  --   --  2.8       Blood Culture:   Recent Labs   Lab 09/15/23  1945 09/15/23  2332   LABBLOO Gram stain aer bottle: Gram positive cocci in clusters resembling Staph  Results called to and read back by: Madison Shipley RN in EMR  09/16/2023  05:57  Gram stain lee bottle: Gram positive cocci in clusters resembling Staph  Positive results previously called  STAPHYLOCOCCUS AUREUS  Refer to previous identification/susceptibility for blood culture   collected on 9/14/23  * Gram stain aer bottle: Gram positive cocci in clusters resembling Staph  Positive results previously called  STAPHYLOCOCCUS AUREUS  Refer to previous identification/susceptibility for blood culture   collected on 9/14/23  *       Significant Imaging:  I have reviewed all pertinent imaging results/findings within the past 24 hours.

## 2023-09-17 NOTE — ASSESSMENT & PLAN NOTE
Patient is a 28y M w/ hx of IVDU who is most recently being treated for infective endocarditis at an OSH who presents as transfer from OSH with chest tube for evaluation by CTS for possible VATS. CT on 9/16: loculated hydropneumothorax.    Recommend consult to IR to place 14Fr pigtail into that posterior/inferior pocket.      Chest tube in place, no air leak, thin SS ouptut.  Pulled back chest tube 2 cm on 9/17.   Pneumothorax appears to have resolved with CT placement.   Recommend chest tube to water seal, daily CXR.   Likely will be able to remove in a few days.   No plans for surgical intervention at this time.

## 2023-09-17 NOTE — SUBJECTIVE & OBJECTIVE
Interval History: off pressors, persistent fevers    Review of Systems   Constitutional:  Positive for chills and fatigue. Negative for activity change and fever.   HENT:  Negative for trouble swallowing.    Respiratory:  Negative for cough, chest tightness and shortness of breath.    Cardiovascular:  Negative for chest pain.   Gastrointestinal:  Negative for vomiting.   Psychiatric/Behavioral:  Negative for confusion.        Objective:     Vital Signs (Most Recent):  Temp: (!) 102.7 °F (39.3 °C) (09/17/23 0613)  Pulse: (!) 112 (09/17/23 0901)  Resp: (!) 44 (09/17/23 0901)  BP: (!) 102/57 (09/17/23 0901)  SpO2: 98 % (09/17/23 0901) Vital Signs (24h Range):  Temp:  [98 °F (36.7 °C)-102.7 °F (39.3 °C)] 102.7 °F (39.3 °C)  Pulse:  [100-224] 112  Resp:  [14-51] 44  SpO2:  [92 %-100 %] 98 %  BP: ()/(50-64) 102/57     Weight: 72.3 kg (159 lb 6.3 oz)  Body mass index is 21.62 kg/m².    Estimated Creatinine Clearance: 140.6 mL/min (based on SCr of 0.8 mg/dL).     Physical Exam  Constitutional:       Appearance: He is ill-appearing and diaphoretic.   HENT:      Head: Normocephalic and atraumatic.      Nose: Nose normal.      Mouth/Throat:      Mouth: Mucous membranes are moist.      Pharynx: Oropharynx is clear.   Eyes:      General: No scleral icterus.     Conjunctiva/sclera: Conjunctivae normal.   Cardiovascular:      Pulses: Normal pulses.   Pulmonary:      Effort: Pulmonary effort is normal.      Breath sounds: Rales present.      Comments: L chest tube to water seal  Abdominal:      General: Bowel sounds are normal.      Tenderness: There is no guarding or rebound.   Musculoskeletal:         General: No tenderness or deformity.      Cervical back: Normal range of motion and neck supple.   Skin:     General: Skin is warm.      Coloration: Skin is not jaundiced.      Findings: Bruising present.   Neurological:      Mental Status: He is alert and oriented to person, place, and time. Mental status is at baseline.           Significant Labs:   Microbiology Results (last 7 days)       Procedure Component Value Units Date/Time    Blood culture [[id number]] Collected: 09/17/23 0635    Order Status: Sent Specimen: Blood from Peripheral, Wrist, Left Updated: 09/17/23 0651    Blood culture [[id number]] Collected: 09/17/23 0636    Order Status: Sent Specimen: Blood from Peripheral, Antecubital, Left Updated: 09/17/23 0651          All pertinent labs within the past 24 hours have been reviewed.  Recent Lab Results         09/17/23  0304        Albumin 1.6       Alkaline Phosphatase 276       ALT 40       Anion Gap 7       AST 38       Baso # 0.02       Basophil % 0.2       BILIRUBIN TOTAL 0.9  Comment: For infants and newborns, interpretation of results should be based  on gestational age, weight and in agreement with clinical  observations.    Premature Infant recommended reference ranges:  Up to 24 hours.............<8.0 mg/dL  Up to 48 hours............<12.0 mg/dL  3-5 days..................<15.0 mg/dL  6-29 days.................<15.0 mg/dL         BUN 12       Calcium 7.9       Chloride 100       CO2 25       Creatinine 0.8       Differential Method Automated       eGFR >60.0       Eos # 0.1       Eosinophil % 0.7       Glucose 123       Gran # (ANC) 10.2       Gran % 81.4       Hematocrit 25.3       Hemoglobin 8.2       Hepatitis C Ab Reactive  Comment: Presumptive evidence of antibodies to HCV; recommend   supplemental testing HCV RNA Quantitative by PCR   ([id number]) if clinically indicated.         HIV 1/2 Ag/Ab Non-reactive       Immature Grans (Abs) 0.08  Comment: Mild elevation in immature granulocytes is non specific and   can be seen in a variety of conditions including stress response,   acute inflammation, trauma and pregnancy. Correlation with other   laboratory and clinical findings is essential.         Immature Granulocytes 0.6       Lymph # 1.5       Lymph % 11.7       Magnesium  1.7       MCH 25.8       MCHC  32.4       MCV 80       Mono # 0.7       Mono % 5.4       MPV 10.7       nRBC 0       Phosphorus 2.8       Platelets 230       Potassium 3.6       PROTEIN TOTAL 5.0       RBC 3.18       RDW 14.8       Sodium 132       WBC 12.52               Significant Imaging: I have reviewed all pertinent imaging results/findings within the past 24 hours.

## 2023-09-17 NOTE — ASSESSMENT & PLAN NOTE
Seen on echocardiogram performed at [facility]. No dimension on chart.  · See as above/below for plan

## 2023-09-17 NOTE — PLAN OF CARE
[facility] - Tele[facility] Stepdown  Initial Discharge Assessment       Primary Care Provider: Nereida, Primary Doctor    Admission Diagnosis: Sepsis [A41.9]    Admission Date: 9/16/2023  Expected Discharge Date:     Transition of Care Barriers: Substance Abuse    Payor: /     Extended Emergency Contact Information  Primary Emergency Contact: Michelle Montez  Mobile Phone: [phone]  Relation: Mother  Preferred language: English   needed? No    Discharge Plan A: Home  Discharge Plan B: Home with family    No Pharmacies Listed      SW met with patient and patient's sister at bedside to complete discharge planning assessment.  Patient alert and oriented xs 4.  Patient verified all demographic information on facesheet is correct.  Patient verified he has NO PCP.  Patient verified primary health insurance is Medicaid (not listed in system).  SW emailed admitting request to review and update patient information.  Patient with NO home health or DME.  Patient with NO POA or Living Will.  Patient not on dialysis or medication coumadin.  Patient with no 30 day admission.  Patient with no financial issues at this time.  Patient family will provide transportation upon discharge from facility.  Patient independent with ADLs, live alone, drives self.      SW discharge drug resources with patient and sister.  SW left information with sister at bedside to review.    Initial Assessment (most recent)       Adult Discharge Assessment - 09/17/23 1449          Discharge Assessment    Assessment Type Discharge Planning Assessment     Confirmed/corrected address, phone number and insurance Yes     Confirmed Demographics Correct on Facesheet     Source of Information patient;family     Communicated DESEAN with patient/caregiver Date not available/Unable to determine     People in Home alone     Facility Arrived From: home     Do you expect to return to your current living situation? Yes     Do you have help at home or someone to help  you manage your care at home? Yes     Who are your caregiver(s) and their phone number(s)? self     Prior to hospitilization cognitive status: Alert/Oriented     Current cognitive status: Alert/Oriented     Equipment Currently Used at Home none     Readmission within 30 days? Yes     Patient currently being followed by outpatient case management? No     Do you currently have service(s) that help you manage your care at home? No     Do you take prescription medications? No     Do you have prescription coverage? No     Do you have any problems affording any of your prescribed medications? TBD     Who is going to help you get home at discharge? family     How do you get to doctors appointments? car, drives self     Are you on dialysis? No     Do you take coumadin? No     DME Needed Upon Discharge  none     Discharge Plan discussed with: Patient;Sibling     Transition of Care Barriers Substance Abuse     Discharge Plan A Home     Discharge Plan B Home with family

## 2023-09-17 NOTE — PLAN OF CARE
No acute events overnight.       Problem: Adult Inpatient Plan of Care  Goal: Plan of Care Review  Outcome: Ongoing, Progressing  Goal: Patient-Specific Goal (Individualized)  Outcome: Ongoing, Progressing  Goal: Absence of Hospital-Acquired Illness or Injury  Outcome: Ongoing, Progressing  Goal: Optimal Comfort and Wellbeing  Outcome: Ongoing, Progressing  Goal: Readiness for Transition of Care  Outcome: Ongoing, Progressing     Problem: Adjustment to Illness (Sepsis/Septic Shock)  Goal: Optimal Coping  Outcome: Ongoing, Progressing     Problem: Bleeding (Sepsis/Septic Shock)  Goal: Absence of Bleeding  Outcome: Ongoing, Progressing

## 2023-09-17 NOTE — HOSPITAL COURSE
Chest tube clamped on 9/17, minimal output of 25 cc recorded overnight. ID changed abx to ceftaroline and dapto on 9/17 for salvage therapy. Still fevering, blood cx still positive for MRSA as of 9/15, repeat cultures drawn on 9/17. No surgical intervention per CTS. Found to be Hep C+, RNA PCR pending.

## 2023-09-17 NOTE — ASSESSMENT & PLAN NOTE
This patient does have evidence of infective focus  My overall impression is sepsis.  Source: Blood  Antibiotics given-     Antibiotics (72h ago, onward)    Start     Stop Route Frequency Ordered    09/17/23 1330  DAPTOmycin (CUBICIN) 580 mg in sodium chloride 0.9% SolP 50 mL IVPB         -- IV Every 24 hours (non-standard times) 09/17/23 1225    09/17/23 1330  ceftaroline fosamiL (TEFLARO) 600 mg in dextrose 5 % in water (D5W) 50 mL IVPB (MB+)         -- IV Every 8 hours (non-standard times) 09/17/23 1225        Latest lactate reviewed-  Recent Labs   Lab 09/14/23  1600 09/15/23  1839 09/15/23  2041   LACTATE 1.6 0.8 0.6     Organ dysfunction indicated by Acute respiratory failure    Fluid challenge Actual Body weight- Patient will receive 30ml/kg actual body weight to calculate fluid bolus for treatment of septic shock.     Post- resuscitation assessment Yes Perfusion exam was performed within 6 hours of septic shock presentation after bolus shows Adequate tissue perfusion assessed by non-invasive monitoring       Will Not start Pressors- Levophed for MAP of 65  Source control achieved by:  Antibiotics    Noted positive MRSA rapid ID at OSH. Likely bacteremia in the setting of known IVDU.     - stopped vanc on 9/17. Started dapto and ceftaroline for salvage therapy  - F/U blood cultures. Persistent bacteremia

## 2023-09-17 NOTE — PLAN OF CARE
09/17/23 1454   Readmission   Why were you hospitalized in the last 30 days? sepsis   Why were you readmitted? Related to previous admission   When you left the hospital how did you feel? left against medical advice at [place] went to ED [facility]   When you left the hospital where did you go? Other   Did patient/caregiver refused recommended DC plan? Yes   Did you try to see or did see a doctor or nurse before you came? Yes   Were you seen? Yes   Why? transfer from [facility] ED to [facility]   Did you have  a follow-up appointment on discharge? No   Was this a planned readmission? No

## 2023-09-17 NOTE — CONSULTS
"Inpatient Radiology Pre-procedure Note    History of Present Illness:  Carlito Rosario is a 28 y.o. male with history of IVDU and infective endocarditis, with a loculated left hydropneumothorax. There is a chest tube in place terminating in the left lung apex. Surgery recommended "consult to IR to place 14Fr pigtail into the posterior/inferior pocket."    Admission H&P reviewed.  Past Medical History:   Diagnosis Date    IVDU (intravenous drug user)      No past surgical history on file.    Review of Systems:   As documented in primary team H&P    Home Meds:   Prior to Admission medications    Medication Sig Start Date End Date Taking? Authorizing Provider   erythromycin (ROMYCIN) ophthalmic ointment Place a 1/2 inch ribbon of ointment into the lower eyelid. 11/8/18   Edyta Hooks PA     Scheduled Meds:    ceftaroline (TEFLARO) IVPB  600 mg Intravenous Q8H    DAPTOmycin (CUBICIN) IV (PEDS and ADULTS)  8 mg/kg Intravenous Q24H    diazePAM  10 mg Oral TID    Followed by    [START ON 9/19/2023] diazePAM  10 mg Oral BID    enoxparin  40 mg Subcutaneous Q24H (prophylaxis, 1700)    nicotine  1 patch Transdermal Daily     Continuous Infusions:   PRN Meds:acetaminophen, dextrose 10%, dextrose 10%, glucagon (human recombinant), glucose, glucose, HYDROmorphone, lorazepam, naloxone, sodium chloride 0.9%  Anticoagulants/Antiplatelets: no anticoagulation    Allergies: Review of patient's allergies indicates:  No Known Allergies  Sedation Hx: have not been any systemic reactions    Labs:  Recent Labs   Lab 09/16/23  0512   INR 1.3*       Recent Labs   Lab 09/17/23  0304   WBC 12.52   HGB 8.2*   HCT 25.3*   MCV 80*         Recent Labs   Lab 09/17/23  0304   *   *   K 3.6      CO2 25   BUN 12   CREATININE 0.8   CALCIUM 7.9*   MG 1.7   ALT 40   AST 38   ALBUMIN 1.6*   BILITOT 0.9         Vitals:  Temp: 99.4 °F (37.4 °C) (09/17/23 1507)  Pulse: (!) 118 (09/17/23 1507)  Resp: 19 (09/17/23 " 1507)  BP: 120/65 (09/17/23 1507)  SpO2: (!) 92 % (09/17/23 1507)     Physical Exam:  ASA: 3  Mallampati: 2    General: no acute distress  Mental Status: alert and oriented to person, place and time  HEENT: normocephalic, atraumatic  Chest: unlabored breathing  Heart: regular heart rate  Abdomen: nondistended  Extremity: moves all extremities    Plan: Chest tube placement into left posterior/inferior component of loculated hydropneumothorax on 9/18/23  Sedation Plan: up to moderate  NPO at midnight.      Odalis Clark MD  Radiology PGY-3

## 2023-09-17 NOTE — ASSESSMENT & PLAN NOTE
Office Visit - Follow up    Wilfrid Ortez   76 y.o. male    Date of Visit: 4/1/2019    Chief Complaint   Patient presents with     Diabetes     fasting     Hyperlipidemia     Hip Pain     right     Shoulder Pain     right       Subjective: Diabetes mellitus type 2.    The patient is fasting for this office visit.  Also requested Azmacort or something like that an inhaler for wheezing that he hears in the.    The patient anticipates going to [facility] for consults regarding pain in the right shoulder and right hip.  Osteoarthritis?.    No blood in stool or urine no chest pain shortness of breath.    Medication list reviewed well-tolerated normal effects.    Previously seen by Dr. GALLARDO at [facility] known as [facility].  In the past after a prostate surgery his .    In the past he is also had low normal hemoglobin and he has had hematology consultation latest hemoglobin 13.7.    Colonoscopy normal on February 25, 2014 with Dr. Bonnie Schneider.    ROS: A comprehensive review of systems was performed and was otherwise negative    Medications:  Prior to Admission medications    Medication Sig Start Date End Date Taking? Authorizing Provider   aspirin 325 MG tablet Take 325 mg by mouth daily.   Yes PROVIDER, HISTORICAL   blood glucose test (ACCU-CHEK SMARTVIEW TEST STRIP) strips Test each day as directed. 2/23/18  Yes Mayito Carlos MD   blood-glucose meter Misc Use to test once per day 2/23/18  Yes Mayito aCrlos MD   dorzolamide-timolol (COSOPT) 22.3-6.8 mg/mL ophthalmic solution APPLY 1 DROP IN BOTH EYES BID 6/13/18  Yes PROVIDER, HISTORICAL   lancets (ACCU-CHEK FASTCLIX) Misc Test each day as directed. 7/28/15  Yes Mayito aCrlos MD   latanoprost (XALATAN) 0.005 % ophthalmic solution Administer 1 drop to both eyes at bedtime.   Yes PROVIDER, HISTORICAL   LORazepam (ATIVAN) 0.5 MG tablet TK 1 T PO QD PRF ANXIETY AND 1-2 TS PO QHS 5/17/18  Yes PROVIDER, HISTORICAL  · See as above/below     omeprazole (PRILOSEC) 20 MG capsule TAKE 1 CAPSULE(20 MG) BY MOUTH DAILY 2/18/19  Yes Mayito Carlos MD   PARoxetine (PAXIL) 20 MG tablet Take 40 mg by mouth every morning. For a total dose of 50 mg a day.   Yes PROVIDER, HISTORICAL   simvastatin (ZOCOR) 20 MG tablet TAKE ONE TABLET BY MOUTH EVERY EVENING 4/1/19  Yes Mayito Carlos MD   simvastatin (ZOCOR) 20 MG tablet TAKE ONE TABLET BY MOUTH EVERY EVENING 12/29/18 4/1/19 Yes Mayito Carlos MD   propranolol (INDERAL) 10 MG tablet Take 10-20 mg by mouth. 6/15/18   PROVIDER, HISTORICAL       Allergies:   Allergies   Allergen Reactions     Ibuprofen Nausea Only       Immunizations:   Immunization History   Administered Date(s) Administered     DT (pediatric) 04/12/2004     Influenza [id number], 01/15/2010     Influenza high dose, seasonal 10/27/2015, 11/07/2016, 01/04/2018, 10/30/2018     Influenza, inj, historic,unspecified 10/22/2007, 10/20/2008, 09/15/2009, 09/23/2010, 10/21/2011     Influenza, seasonal,quad inj 6-35 mos 12/10/2012, 09/23/2013, 09/23/2014     Pneumo Conj 13-V (2010&after) 07/28/2015     Pneumo Polysac 23-V 10/20/2008     Td,adult,historic,unspecified 04/12/2004, 01/23/2014     Tdap 01/23/2014     ZOSTER, LIVE 01/01/1900, 06/20/2012       Exam Chest clear to auscultation and percussion.  Heart tones regular rhythm without murmur rub or gallop.  Abdomen soft nontender no organomegaly.  No peritoneal signs.  Extremities free of edema cyanosis or clubbing.  Neck veins nondistended no thyromegaly or scleral icterus noted, carotids full.  Skin warm and dry easily conversant good spirited.  Normal intelligence.  Neurologically intact no gross localizing findings.    122/70 pulse 87 regular respirations 18 O2 sats 98%.  Weight down 3 pounds.    Diabetic foot examination all clear.    Assessment and Plan  Diabetes mellitus type 2 check A1c blood sugar lipid panel urine for microalbumin and hemoglobin level today.    Screen for prostate  cancer with history of prior prostate surgery latest PSA 1.0.    Borderline anemia previous hemoglobin 13.7 wonders if he should take multivitamins.  Check hemoglobin level today colonoscopy normal February 25, 2014.    Wheezing\airways obstructive disease COPD.  Azmacort trial.  Consider Ventolin or albuterol metered-dose inhaler.    Ibuprofen allergy.  RTC 3 months time fasting.    Time: total time spent with the patient was 40 minutes of which >50% was spent in counseling and coordination of care        Mayito Carlos MD    Patient Active Problem List   Diagnosis     Hiatal Hernia     Lyme Disease     Benign Prostatic Hypertrophy     Type 2 Diabetes Mellitus - Uncomplicated, Controlled     Hyperlipidemia     Adjustment Disorder With Anxiety     Basal Cell Carcinoma Of The Skin

## 2023-09-18 VITALS
BODY MASS INDEX: 21.54 KG/M2 | RESPIRATION RATE: 20 BRPM | DIASTOLIC BLOOD PRESSURE: 54 MMHG | WEIGHT: 159 LBS | HEIGHT: 72 IN | OXYGEN SATURATION: 94 % | SYSTOLIC BLOOD PRESSURE: 99 MMHG | TEMPERATURE: 99 F | HEART RATE: 113 BPM

## 2023-09-18 PROBLEM — F11.93 OPIOID WITHDRAWAL: Status: ACTIVE | Noted: 2023-09-18

## 2023-09-18 PROBLEM — F11.20 OPIOID USE DISORDER, SEVERE, DEPENDENCE: Chronic | Status: ACTIVE | Noted: 2023-09-18

## 2023-09-18 LAB
ALBUMIN SERPL BCP-MCNC: 1.6 G/DL (ref 3.5–5.2)
ALP SERPL-CCNC: 235 U/L (ref 55–135)
ALT SERPL W/O P-5'-P-CCNC: 29 U/L (ref 10–44)
ANION GAP SERPL CALC-SCNC: 7 MMOL/L (ref 8–16)
ASCENDING AORTA: 2.67 CM
AST SERPL-CCNC: 24 U/L (ref 10–40)
AV INDEX (PROSTH): 0.99
AV MEAN GRADIENT: 4 MMHG
AV PEAK GRADIENT: 7 MMHG
AV VALVE AREA BY VELOCITY RATIO: 3.82 CM²
AV VALVE AREA: 3.95 CM²
AV VELOCITY RATIO: 0.96
BASOPHILS # BLD AUTO: 0.01 K/UL (ref 0–0.2)
BASOPHILS NFR BLD: 0.1 % (ref 0–1.9)
BILIRUB SERPL-MCNC: 0.6 MG/DL (ref 0.1–1)
BSA FOR ECHO PROCEDURE: 1.91 M2
BUN SERPL-MCNC: 9 MG/DL (ref 6–20)
CALCIUM SERPL-MCNC: 8 MG/DL (ref 8.7–10.5)
CHLORIDE SERPL-SCNC: 104 MMOL/L (ref 95–110)
CK SERPL-CCNC: 10 U/L (ref 20–200)
CO2 SERPL-SCNC: 25 MMOL/L (ref 23–29)
CREAT SERPL-MCNC: 0.8 MG/DL (ref 0.5–1.4)
CV ECHO LV RWT: 0.23 CM
DIFFERENTIAL METHOD: ABNORMAL
DOP CALC AO PEAK VEL: 1.29 M/S
DOP CALC AO VTI: 16.97 CM
DOP CALC LVOT AREA: 4 CM2
DOP CALC LVOT DIAMETER: 2.25 CM
DOP CALC LVOT PEAK VEL: 1.24 M/S
DOP CALC LVOT STROKE VOLUME: 67.08 CM3
DOP CALCLVOT PEAK VEL VTI: 16.88 CM
E WAVE DECELERATION TIME: 103.53 MSEC
E/A RATIO: 1.09
E/E' RATIO: 6.17 M/S
ECHO LV POSTERIOR WALL: 0.58 CM (ref 0.6–1.1)
EJECTION FRACTION: 60 %
EOSINOPHIL # BLD AUTO: 0.2 K/UL (ref 0–0.5)
EOSINOPHIL NFR BLD: 1.1 % (ref 0–8)
ERYTHROCYTE [DISTWIDTH] IN BLOOD BY AUTOMATED COUNT: 15.4 % (ref 11.5–14.5)
EST. GFR  (NO RACE VARIABLE): >60 ML/MIN/1.73 M^2
FRACTIONAL SHORTENING: 33 % (ref 28–44)
GLUCOSE SERPL-MCNC: 100 MG/DL (ref 70–110)
HCT VFR BLD AUTO: 25.9 % (ref 40–54)
HCV RNA SERPL NAA+PROBE-LOG IU: 4.13 LOGIU/ML
HCV RNA SERPL NAA+PROBE-LOG IU: 4.21 LOGIU/ML
HCV RNA SERPL QL NAA+PROBE: DETECTED
HCV RNA SERPL QL NAA+PROBE: DETECTED
HCV RNA SPEC NAA+PROBE-ACNC: ABNORMAL IU/ML
HCV RNA SPEC NAA+PROBE-ACNC: ABNORMAL IU/ML
HGB BLD-MCNC: 8.6 G/DL (ref 14–18)
IMM GRANULOCYTES # BLD AUTO: 0.15 K/UL (ref 0–0.04)
IMM GRANULOCYTES NFR BLD AUTO: 1.1 % (ref 0–0.5)
INTERVENTRICULAR SEPTUM: 0.65 CM (ref 0.6–1.1)
IVRT: 70.41 MSEC
LA MAJOR: 5.25 CM
LA MINOR: 5.09 CM
LA WIDTH: 3.74 CM
LEFT ATRIUM SIZE: 3.17 CM
LEFT ATRIUM VOLUME INDEX MOD: 27.2 ML/M2
LEFT ATRIUM VOLUME INDEX: 27 ML/M2
LEFT ATRIUM VOLUME MOD: 52.46 CM3
LEFT ATRIUM VOLUME: 52.09 CM3
LEFT INTERNAL DIMENSION IN SYSTOLE: 3.42 CM (ref 2.1–4)
LEFT VENTRICLE DIASTOLIC VOLUME INDEX: 63.8 ML/M2
LEFT VENTRICLE DIASTOLIC VOLUME: 123.13 ML
LEFT VENTRICLE MASS INDEX: 52 G/M2
LEFT VENTRICLE SYSTOLIC VOLUME INDEX: 24.9 ML/M2
LEFT VENTRICLE SYSTOLIC VOLUME: 48.04 ML
LEFT VENTRICULAR INTERNAL DIMENSION IN DIASTOLE: 5.09 CM (ref 3.5–6)
LEFT VENTRICULAR MASS: 100.91 G
LV LATERAL E/E' RATIO: 6.45 M/S
LV SEPTAL E/E' RATIO: 5.92 M/S
LYMPHOCYTES # BLD AUTO: 2.4 K/UL (ref 1–4.8)
LYMPHOCYTES NFR BLD: 17.1 % (ref 18–48)
MAGNESIUM SERPL-MCNC: 1.9 MG/DL (ref 1.6–2.6)
MCH RBC QN AUTO: 25.2 PG (ref 27–31)
MCHC RBC AUTO-ENTMCNC: 33.2 G/DL (ref 32–36)
MCV RBC AUTO: 76 FL (ref 82–98)
MONOCYTES # BLD AUTO: 1.1 K/UL (ref 0.3–1)
MONOCYTES NFR BLD: 7.5 % (ref 4–15)
MV PEAK A VEL: 0.65 M/S
MV PEAK E VEL: 0.71 M/S
MV STENOSIS PRESSURE HALF TIME: 30.02 MS
MV VALVE AREA P 1/2 METHOD: 7.33 CM2
NEUTROPHILS # BLD AUTO: 10.3 K/UL (ref 1.8–7.7)
NEUTROPHILS NFR BLD: 73.1 % (ref 38–73)
NRBC BLD-RTO: 0 /100 WBC
PHOSPHATE SERPL-MCNC: 3.3 MG/DL (ref 2.7–4.5)
PISA TR MAX VEL: 2.97 M/S
PLATELET # BLD AUTO: 338 K/UL (ref 150–450)
PMV BLD AUTO: 11 FL (ref 9.2–12.9)
POTASSIUM SERPL-SCNC: 3.6 MMOL/L (ref 3.5–5.1)
PROT SERPL-MCNC: 5.5 G/DL (ref 6–8.4)
RA MAJOR: 4.24 CM
RA PRESSURE ESTIMATED: 3 MMHG
RA WIDTH: 3.98 CM
RBC # BLD AUTO: 3.41 M/UL (ref 4.6–6.2)
RIGHT VENTRICULAR END-DIASTOLIC DIMENSION: 2.88 CM
RPR SER QL: NORMAL
RV TB RVSP: 6 MMHG
SINUS: 3.08 CM
SODIUM SERPL-SCNC: 136 MMOL/L (ref 136–145)
STJ: 2.58 CM
TDI LATERAL: 0.11 M/S
TDI SEPTAL: 0.12 M/S
TDI: 0.12 M/S
TR MAX PG: 35 MMHG
TRICUSPID ANNULAR PLANE SYSTOLIC EXCURSION: 3.09 CM
TV REST PULMONARY ARTERY PRESSURE: 38 MMHG
WBC # BLD AUTO: 14.06 K/UL (ref 3.9–12.7)
Z-SCORE OF LEFT VENTRICULAR DIMENSION IN END DIASTOLE: -0.71
Z-SCORE OF LEFT VENTRICULAR DIMENSION IN END SYSTOLE: 0.13

## 2023-09-18 PROCEDURE — 99222 PR INITIAL HOSPITAL CARE,LEVL II: ICD-10-PCS | Mod: ,,, | Performed by: PSYCHIATRY & NEUROLOGY

## 2023-09-18 PROCEDURE — 84100 ASSAY OF PHOSPHORUS: CPT

## 2023-09-18 PROCEDURE — 25000003 PHARM REV CODE 250

## 2023-09-18 PROCEDURE — 82550 ASSAY OF CK (CPK): CPT | Performed by: STUDENT IN AN ORGANIZED HEALTH CARE EDUCATION/TRAINING PROGRAM

## 2023-09-18 PROCEDURE — 63600175 PHARM REV CODE 636 W HCPCS: Performed by: INTERNAL MEDICINE

## 2023-09-18 PROCEDURE — 63600175 PHARM REV CODE 636 W HCPCS

## 2023-09-18 PROCEDURE — 87150 DNA/RNA AMPLIFIED PROBE: CPT | Performed by: STUDENT IN AN ORGANIZED HEALTH CARE EDUCATION/TRAINING PROGRAM

## 2023-09-18 PROCEDURE — 99233 PR SUBSEQUENT HOSPITAL CARE,LEVL III: ICD-10-PCS | Mod: ,,, | Performed by: INTERNAL MEDICINE

## 2023-09-18 PROCEDURE — 36415 COLL VENOUS BLD VENIPUNCTURE: CPT

## 2023-09-18 PROCEDURE — 83735 ASSAY OF MAGNESIUM: CPT

## 2023-09-18 PROCEDURE — 80053 COMPREHEN METABOLIC PANEL: CPT

## 2023-09-18 PROCEDURE — 85025 COMPLETE CBC W/AUTO DIFF WBC: CPT

## 2023-09-18 PROCEDURE — 99233 SBSQ HOSP IP/OBS HIGH 50: CPT | Mod: ,,, | Performed by: INTERNAL MEDICINE

## 2023-09-18 PROCEDURE — 87040 BLOOD CULTURE FOR BACTERIA: CPT | Mod: 59 | Performed by: STUDENT IN AN ORGANIZED HEALTH CARE EDUCATION/TRAINING PROGRAM

## 2023-09-18 PROCEDURE — 36415 COLL VENOUS BLD VENIPUNCTURE: CPT | Performed by: STUDENT IN AN ORGANIZED HEALTH CARE EDUCATION/TRAINING PROGRAM

## 2023-09-18 PROCEDURE — 63600175 PHARM REV CODE 636 W HCPCS: Performed by: STUDENT IN AN ORGANIZED HEALTH CARE EDUCATION/TRAINING PROGRAM

## 2023-09-18 PROCEDURE — 94761 N-INVAS EAR/PLS OXIMETRY MLT: CPT

## 2023-09-18 PROCEDURE — 25000003 PHARM REV CODE 250: Performed by: STUDENT IN AN ORGANIZED HEALTH CARE EDUCATION/TRAINING PROGRAM

## 2023-09-18 PROCEDURE — S4991 NICOTINE PATCH NONLEGEND: HCPCS

## 2023-09-18 PROCEDURE — 99222 1ST HOSP IP/OBS MODERATE 55: CPT | Mod: ,,, | Performed by: PSYCHIATRY & NEUROLOGY

## 2023-09-18 RX ORDER — SULFAMETHOXAZOLE AND TRIMETHOPRIM 800; 160 MG/1; MG/1
1 TABLET ORAL DAILY
Qty: 90 TABLET | Refills: 0 | OUTPATIENT
Start: 2023-09-18 | End: 2023-10-07

## 2023-09-18 RX ORDER — HYDROXYZINE HYDROCHLORIDE 25 MG/1
50 TABLET, FILM COATED ORAL 3 TIMES DAILY PRN
Status: DISCONTINUED | OUTPATIENT
Start: 2023-09-18 | End: 2023-09-18 | Stop reason: HOSPADM

## 2023-09-18 RX ORDER — LORAZEPAM 2 MG/ML
2 INJECTION INTRAMUSCULAR EVERY 4 HOURS PRN
Status: DISCONTINUED | OUTPATIENT
Start: 2023-09-18 | End: 2023-09-18 | Stop reason: HOSPADM

## 2023-09-18 RX ORDER — HYDROMORPHONE HYDROCHLORIDE 1 MG/ML
1 INJECTION, SOLUTION INTRAMUSCULAR; INTRAVENOUS; SUBCUTANEOUS EVERY 6 HOURS PRN
Status: DISCONTINUED | OUTPATIENT
Start: 2023-09-18 | End: 2023-09-18 | Stop reason: HOSPADM

## 2023-09-18 RX ORDER — LORAZEPAM 2 MG/ML
2 INJECTION INTRAMUSCULAR EVERY 6 HOURS PRN
Status: DISCONTINUED | OUTPATIENT
Start: 2023-09-18 | End: 2023-09-18

## 2023-09-18 RX ORDER — ENOXAPARIN SODIUM 100 MG/ML
40 INJECTION SUBCUTANEOUS EVERY 24 HOURS
Status: DISCONTINUED | OUTPATIENT
Start: 2023-09-19 | End: 2023-09-18 | Stop reason: HOSPADM

## 2023-09-18 RX ADMIN — DIAZEPAM 10 MG: 5 TABLET ORAL at 02:09

## 2023-09-18 RX ADMIN — CEFTAROLINE FOSAMIL 600 MG: 600 POWDER, FOR SOLUTION INTRAVENOUS at 05:09

## 2023-09-18 RX ADMIN — CEFTAROLINE FOSAMIL 600 MG: 600 POWDER, FOR SOLUTION INTRAVENOUS at 01:09

## 2023-09-18 RX ADMIN — LORAZEPAM 2 MG: 2 INJECTION INTRAMUSCULAR; INTRAVENOUS at 03:09

## 2023-09-18 RX ADMIN — LORAZEPAM 2 MG: 2 INJECTION INTRAMUSCULAR; INTRAVENOUS at 01:09

## 2023-09-18 RX ADMIN — LORAZEPAM 2 MG: 2 INJECTION INTRAMUSCULAR; INTRAVENOUS at 10:09

## 2023-09-18 RX ADMIN — DAPTOMYCIN 580 MG: 350 INJECTION, POWDER, LYOPHILIZED, FOR SOLUTION INTRAVENOUS at 02:09

## 2023-09-18 RX ADMIN — LORAZEPAM 2 MG: 2 INJECTION INTRAMUSCULAR; INTRAVENOUS at 06:09

## 2023-09-18 RX ADMIN — HYDROMORPHONE HYDROCHLORIDE 1 MG: 1 INJECTION, SOLUTION INTRAMUSCULAR; INTRAVENOUS; SUBCUTANEOUS at 01:09

## 2023-09-18 RX ADMIN — NICOTINE 1 PATCH: 7 PATCH, EXTENDED RELEASE TRANSDERMAL at 10:09

## 2023-09-18 RX ADMIN — DIAZEPAM 10 MG: 5 TABLET ORAL at 08:09

## 2023-09-18 NOTE — NURSING
Nurses Note -- 4 Eyes      9/18/2023   7:26 AM      Skin assessed during: Q Shift Change      [x] No Altered Skin Integrity Present    []Prevention Measures Documented      [] Yes- Altered Skin Integrity Present or Discovered   [] LDA Added if Not in Epic (Describe Wound)   [] New Altered Skin Integrity was Present on Admit and Documented in LDA   [] Wound Image Taken    Wound Care Consulted? No    Attending Nurse:   Adele MCCALL    Second RN/Staff Member:  Get MCCALL

## 2023-09-18 NOTE — ASSESSMENT & PLAN NOTE
Follow cultures  Positive since 9/14. Will follow up repeat cultures on 9/18. CTS following as well

## 2023-09-18 NOTE — PT/OT/SLP PROGRESS
Occupational Therapy      Patient Name:  Carlito Rosario   MRN:  [id number]    Patient not seen today secondary to  (RN hold - per RN pt going through heroin withdrawal). Will follow-up as appropriate.    9/18/2023

## 2023-09-18 NOTE — PLAN OF CARE
Problem: Adult Inpatient Plan of Care  Goal: Plan of Care Review  Outcome: Ongoing, Not Progressing  Goal: Patient-Specific Goal (Individualized)  Outcome: Ongoing, Not Progressing  Goal: Absence of Hospital-Acquired Illness or Injury  Outcome: Ongoing, Not Progressing  Goal: Optimal Comfort and Wellbeing  Outcome: Ongoing, Not Progressing  Goal: Readiness for Transition of Care  Outcome: Ongoing, Not Progressing     Problem: Adjustment to Illness (Sepsis/Septic Shock)  Goal: Optimal Coping  Outcome: Ongoing, Not Progressing     Problem: Bleeding (Sepsis/Septic Shock)  Goal: Absence of Bleeding  Outcome: Ongoing, Not Progressing     Problem: Glycemic Control Impaired (Sepsis/Septic Shock)  Goal: Blood Glucose Level Within Desired Range  Outcome: Ongoing, Not Progressing     Problem: Infection Progression (Sepsis/Septic Shock)  Goal: Absence of Infection Signs and Symptoms  Outcome: Ongoing, Not Progressing     Problem: Nutrition Impaired (Sepsis/Septic Shock)  Goal: Optimal Nutrition Intake  Outcome: Ongoing, Not Progressing     Problem: Fluid Imbalance (Pneumonia)  Goal: Fluid Balance  Outcome: Ongoing, Not Progressing     Problem: Infection (Pneumonia)  Goal: Resolution of Infection Signs and Symptoms  Outcome: Ongoing, Not Progressing     Problem: Respiratory Compromise (Pneumonia)  Goal: Effective Oxygenation and Ventilation  Outcome: Ongoing, Not Progressing     Problem: Infection  Goal: Absence of Infection Signs and Symptoms  Outcome: Ongoing, Not Progressing     Problem: Skin Injury Risk Increased  Goal: Skin Health and Integrity  Outcome: Ongoing, Not Progressing   His day has been very eventful.  He is now asking to leave ama and wants to talk to the attending and I did notify the attending who is coming to the bedside.  I tried to explain the importance of continued care but he says he wants to leave and seek help elsewhere.  His chest tube remains in place to the left chest wall and has drained straw  colored liquid.  He has required numerous prn's today regarding ciwa of >8.  He remains aaox4 and not combative at this time but just insist on wanting to leave.  I explained to him that I will not hold him against his will.  No sign of distress noted and safety precautions remain in place.

## 2023-09-18 NOTE — ASSESSMENT & PLAN NOTE
Patient with noted tricuspid vegetation on TTE at OSH and septic emboli in the setting of IVDU.     - Continue dapto/ceftaroline per ID for salvage therapy  - f/u persistently +blood cx  - no surgery per CTS at this time  - f/u echo completed today

## 2023-09-18 NOTE — SUBJECTIVE & OBJECTIVE
Interval History: Feeling anxious and asking about discharge. Discussed treatment plan with patient and 2 family members at bedside. Ongoing fevers (102.2F) with fluctuating MAPs overnight that responded to fluid resuscitation. Remains on room air. Labs with stable leukocytosis 14. BCx 9/17 growing GPCs (staph) pending spp/sen. Repeat BCx ordered. Hep C PCR in process. Remains on salvage therapy with Dapto and Teflaro. IR consulted for chest tube placement into loculated collection with tentative plan for today 9/18.    Review of Systems   Constitutional:  Positive for chills, fatigue and fever. Negative for appetite change.   Respiratory:  Negative for cough and shortness of breath.    Cardiovascular:  Negative for chest pain and leg swelling.   Gastrointestinal:  Negative for abdominal pain, diarrhea, nausea and vomiting.   Genitourinary:  Negative for dysuria, flank pain and hematuria.   Musculoskeletal:  Negative for arthralgias and myalgias.   Neurological:  Positive for weakness. Negative for dizziness.   Psychiatric/Behavioral:  Negative for confusion.      Objective:     Vital Signs (Most Recent):  Temp: 99.1 °F (37.3 °C) (09/18/23 0809)  Pulse: 105 (09/18/23 1043)  Resp: 16 (09/18/23 0809)  BP: 113/61 (09/18/23 1043)  SpO2: 97 % (09/18/23 0809) Vital Signs (24h Range):  Temp:  [98.3 °F (36.8 °C)-102.2 °F (39 °C)] 99.1 °F (37.3 °C)  Pulse:  [] 105  Resp:  [7-35] 16  SpO2:  [92 %-100 %] 97 %  BP: ()/(51-65) 113/61     Weight: 72.1 kg (159 lb)  Body mass index is 21.56 kg/m².    Estimated Creatinine Clearance: 140.2 mL/min (based on SCr of 0.8 mg/dL).     Physical Exam  Vitals and nursing note reviewed.   Constitutional:       General: He is awake. He is not in acute distress.     Appearance: Normal appearance. He is normal weight. He is ill-appearing and toxic-appearing. He is not diaphoretic.      Comments: Appears fatigued but alerts to verbal stimulation   HENT:      Head: Normocephalic and  atraumatic.      Nose: Nose normal.      Mouth/Throat:      Mouth: Mucous membranes are moist.   Eyes:      General: No scleral icterus.        Right eye: No discharge.         Left eye: No discharge.      Extraocular Movements: Extraocular movements intact.      Pupils: Pupils are equal, round, and reactive to light.   Cardiovascular:      Rate and Rhythm: Regular rhythm. Tachycardia present.      Chest Wall: Thrill present.      Pulses: Normal pulses.      Heart sounds: Murmur heard.   Pulmonary:      Effort: Pulmonary effort is normal. Tachypnea present. No respiratory distress.      Breath sounds: Rales present. No wheezing.   Chest:      Chest wall: No tenderness.   Abdominal:      General: Bowel sounds are normal. There is no distension.      Palpations: Abdomen is soft.      Tenderness: There is no abdominal tenderness.   Musculoskeletal:         General: No swelling or tenderness.      Cervical back: Normal range of motion.      Right lower leg: No edema.      Left lower leg: No edema.   Skin:     General: Skin is warm and dry.      Capillary Refill: Capillary refill takes less than 2 seconds.      Findings: No erythema or rash.   Neurological:      General: No focal deficit present.      Mental Status: He is alert and oriented to person, place, and time.   Psychiatric:         Mood and Affect: Mood is anxious.         Speech: Speech is delayed.         Behavior: Behavior is cooperative.        Significant Labs: All pertinent labs within the past 24 hours have been reviewed.    Significant Imaging: I have reviewed all pertinent imaging results/findings within the past 24 hours.

## 2023-09-18 NOTE — ASSESSMENT & PLAN NOTE
This patient does have evidence of infective focus  My overall impression is sepsis.  Source: Blood  Antibiotics given-     Antibiotics (72h ago, onward)    Start     Stop Route Frequency Ordered    09/17/23 1330  DAPTOmycin (CUBICIN) 580 mg in sodium chloride 0.9% SolP 50 mL IVPB         -- IV Every 24 hours (non-standard times) 09/17/23 1225    09/17/23 1330  ceftaroline fosamiL (TEFLARO) 600 mg in dextrose 5 % in water (D5W) 50 mL IVPB (MB+)         -- IV Every 8 hours (non-standard times) 09/17/23 1225        Latest lactate reviewed-  Recent Labs   Lab 09/15/23  1839 09/15/23  2041 09/17/23  2100   LACTATE 0.8 0.6 1.2     Organ dysfunction indicated by Acute respiratory failure    Fluid challenge Actual Body weight- Patient will receive 30ml/kg actual body weight to calculate fluid bolus for treatment of septic shock.     Post- resuscitation assessment Yes Perfusion exam was performed within 6 hours of septic shock presentation after bolus shows Adequate tissue perfusion assessed by non-invasive monitoring       Will Not start Pressors- Levophed for MAP of 65  Source control achieved by:  Antibiotics    Noted positive MRSA rapid ID at OSH. Likely bacteremia in the setting of known IVDU.     - CTS recommended IR consult for pigtail catheter placement but patient deferring decision at this time. Will continue discussion  - stopped vanc on 9/17. Started dapto and ceftaroline for salvage therapy on 9/17  - F/U blood cultures. Persistent bacteremia

## 2023-09-18 NOTE — ASSESSMENT & PLAN NOTE
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 General surgery following patient, advised 14Fr pigtail into that posterior/inferior pocket, no surgical plans at this time. Currently he does not seem to have any new areas of metastatic foci of infection on exam.    Course has been complicated by positive blood cultures since 9/14, subtherapeutic vancomycin levels, and high burden of bacteremia      Recommendations:  - Continue salvage therapy with Daptomycin and Ceftaroline  - Repeat blood cultures q48 hours until microbiological clearance  - TTE performed, will follow up results  - Not an OPAT candidate given IVDU hx, will need a facility transfer upon discharge  - HIV negative, Hep C Ab+ , PCR in process, if positive will need outpatient hepatology referral to initiate therapy  - Discussed ID management with patient's family members at the bedside  "

## 2023-09-18 NOTE — NURSING
Bedside handoff report complete.  He is wake and very anxious.  CIWA score is 10 at this time.  Parent is at the bedside.  No sign of distress at this time and safety precautions remain in place.

## 2023-09-18 NOTE — DISCHARGE INSTRUCTIONS
Thank you for allowing me to participate as part of your health care team, and thank you for choosing [facility].    DONYA WAGONER MD  Board Certified in Psychiatry & Addiction Medicine      IN CASE OF SUICIDAL THINKING, call the National Suicide Hotline Number: 988    988 Suicide & Crisis Lifeline: 988 , 7-719-848-TALK (8255)  [email or web address]://[email or web address]           AFTER VISIT INSTRUCTIONS:     [x] Take all medication, from all providers, as prescribed.  [x] If questions or concerns arise, or if experiencing side effects, adverse reactions or worsening symptoms, contact your provider through the [facility] portal at [email or web address], or call [phone] to reach the [facility] main line.  [x] In cases of emergencies, call [phone] or [phone], or present directly to the emergency department for immediate assistance.       - abstain from alcohol and illicit drug use  - routinely attend 12 step (or equivalent) mutual self-help meetings  - work with a sponsor  - complete a licensed addiction rehabilitation program  - establish sobriety and maintain a recovery lifestyle      INFORMATION ON MENTAL HEALTH MEDICATIONS:     National [place] of Mental Health:   https://www.nimh.nih.gov/health/topics/mental-health-medications     Web MD:   https://www.webmd.com       RESOURCES:     IN CASE OF SUICIDAL THINKING, call the National Suicide Hotline Number: 988    988 Suicide & Crisis Lifeline: 988 , 7-421-669-TALK (8255)  Provides 24/7, free and confidential support for people in distress, prevention and crisis resources for you or your loved ones, and best practices for professionals.    Call, text or chat.  https://[employer].Right Hemisphere     National Action [place] for Suicide Prevention: the National [place] for Suicide Prevention ([place]) is the nations public-private partnership for suicide prevention, working with more than 250 national partners.   https://theactionalliance.org     National Strategy for  Suicide Prevention & Risk Mitigation:  https://theactionalliance.org/our-strategy/national-strategy-suicide-prevention     [x] Fact Sheet:   https://www.[facility].gov/sites/default/files/national-strategy-for-suicide-prevention-factsheet.pdf     [x] Report:   https://www.ncbi.nlm.nih.gov/books/IXU484556/pdf/Bookshelf_NBK109917.pdf     Suicide Prevention Resource Center: The Suicide Prevention Resource Center (SPR) is the only federally supported resource center devoted to advancing the implementation of the National Strategy for Suicide Prevention. [facility] is funded by the U.S. Department of Health and Human Services' Substance Abuse and Mental Health Services Administration (SAMA).  https://www.[facility].org     [x] Safety Plan:   https://[facility]/wp-content/uploads/2021/08/Fabby-Safety-Plan-8-6-21.pdf     [x] Suicide Risk Curve:  https://[facility]/wp-content/uploads/2021/08/Ekfxcdm-yxgx-nmrlc-8-6-21.pdf     [place] Mental Health Advocacy Service: the state agency tasked with protecting the legal rights of people with behavioral health diagnoses.  https://mhas.[place].[facility]     Alcoholics Anonymous (AA): find a meeting near you.  https://www.aa.org     SMI Adviser: resources for individuals and families with serious mental illness.  https://smiadviser.org     National [place] for the Mentally Ill (NITIN): the nation's largest grassroots organization dedicated to building better lives for individuals with mental illness.  https://www.nitin.org/Home     U.S. Department of Health and Human Services (HHS): the mission of HHS is to enhance the health and well-being of all Americans, by providing for effective health and human services and by fostering sound, sustained advances in the sciences underlying medicine, public health, and .   https://www.hhs.gov     Substance Abuse and Mental Health Services Administration (SAMHSA): [facility] is the agency within [facility] that leads public  health efforts to advance the behavioral health of the nation. [facility]'s mission is to reduce the impact of substance abuse and mental illness on [place] communities.   https://www.samhsa.gov     National Institutes of Health ([facility] [www.[facility].com]): a part of [facility], [facility] [www.[facility].com] is the largest biomedical research agency in the world.   https://www.nih.gov     National [place] on Drug Abuse (HANNA): sponsored by the NIH, the mission of HANNA is to advance science on drug use and addiction and to apply that knowledge to improve individual and public health.  https://hanna.nih.gov     National [place] on Alcohol Abuse and Alcoholism (NIAAA): sponsored by the NIH, the mission of NIAA is to generate and disseminate fundamental knowledge about the effects of alcohol on health and well-being, and apply that knowledge to improve diagnosis, prevention, and treatment of alcohol-related problems, including alcohol use disorder, across the lifespan.   https://www.niaaa.nih.gov     National Harm Reduction Coalition: resources for harm reduction, including techniques, strategies, policy, and advocacy.  https://harmreduction.org     The SHARE Approach - A Model for Shared Decision Making:  [x] Fact Sheet  https://www.ahrq.gov/sites/default/files/publications/files/share-approach_factsheet.pdf     AMA Principles of Medical Ethics - Informed Consent & Shared Decision Making:  [x] Chapter  https://www.ama-assn.org/system/files/2019-06/code-of-medical-scbmdq-aqzsgxi-7.pdf     Safety Netting for Primary Care:  [x] Article  https://www.ncbi.nlm.nih.gov/pmc/articles/JLK3402767/pdf/dhsoiun-0108--e70.pdf       MEDICATION MANAGEMENT:     [x] In addition to the potential beneficial effects, the use of any medication or drug (prescribed, over the counter or otherwise) carries with it the risk of potential adverse effects.  Each has a set of typical adverse effects - some common, some rare - but idiosyncratic and unanticipated reactions unique to you are always  possible.      [x] It is important to remember that untreated illness can also pose a risk, which must be taken into account when weighing the pros and cons of a medication trial.    [x] Medications and drugs can sometimes interact with each other in the body, leading to adverse effects - it is important that all your providers know all the medications and drugs you take - prescribed, over the counter, or otherwise.  Keep all your practitioners up to date with any changes.  It's always a good idea to keep an up-to-date list in an easily accessible location.    [x] There is an inherent unpredictability to all treatment, including the use of medication.  Unexpected outcomes can occur - keep me up to date with any difficulties you encounter.    [x] It is important to take medication as directed, and to comply fully with the instructions.  Check with the appropriate provider first before adjusting or stopping your medication on your own.    If you require further information pertaining to the issues outlined above, please reach out to your providers through the [facility] portal at [email or web address], or call [phone] to discuss.  See resource list for additional material.     Additional information can be provided pertaining to your diagnosis, intended outcomes, target symptoms for treatment, and possible benefits and risks of medication - you can also access this information through the provided resources.  Possible alternatives to the current treatment plan (including no treatment) can also be reviewed.      GENERAL HEALTH & WELLNESS:     [x] Establish and follow regularly with a primary care physician for routine health maintenance and management of any medical comorbidities.  [x] Follow a healthy diet, exercise routinely, and monitor weight and metabolic parameters.  [x] Allow adequate time for sleep and practice good sleep hygiene.  [x] Do not operate a motor vehicle or heavy machinery if the effects of  medications or the symptoms underlying your condition impair the ability for you to do so safely.    Dietary Guidelines for Americans, 9071-5934:  U.S. Department of Agriculture (USDA)  https://www.dietaryguidelines.gov/sites/default/files/2020-12/Dietary_Guidelines_for_Americans_2020-2025.pdf#page=31     The Nutrition Source:  [facility]  https://www.[facility] [place].[facility]/nutritionsource       SLEEP HYGIENE:     Follow these tips to establish healthy sleep habits:  [x] Keep a consistent sleep schedule. Get up at the same time every day, even on weekends or during vacations.  [x] Set a bedtime that is early enough for you to get at least 7-8 hours of sleep.  [x] Don't go to bed unless you are sleepy.  [x] If you don't fall asleep after 20 minutes, get out of bed. Go do a quiet activity without a lot of light exposure. It is especially important to not get on electronics.  [x] Establish a relaxing bedtime routine.  [x] Use your bed only for sleep and sex.  [x] Make your bedroom quiet and relaxing. Keep the room at a comfortable, cool temperature.  [x] Limit exposure to bright light in the evenings.  [x] Turn off electronic devices at least 30 minutes before bedtime.  [x] Don't eat a large meal before bedtime. If you are hungry at night, eat a light, healthy snack.  [x] Exercise regularly and maintain a healthy diet.  [x] Avoid consuming caffeine in the afternoon or evening.  [x] Avoid consuming alcohol before bedtime.  [x] Reduce your fluid intake before bedtime.    QUICK TIPS FOR BETTER SLEEP  Reduce smartphone usage Create and maintain a nightly ritual Avoid caffeine 4-6 hours before sleeping Don't eat or drink too much at bedtime Sleep at the same time every night        American Academy of Sleep Medicine - Healthy Sleep Habits:  https://sleepeducation.org/healthy-sleep/healthy-sleep-habits     American Academy of Sleep Medicine - Bedtime  Calculator:  https://sleepeducation.org/healthy-sleep/bedtime-calculator     American Academy of Sleep Medicine - Cognitive Behavioral Therapy for Insomnia (CBT-I):  https://sleepeducation.org/patients/cognitive-behavioral-therapy     American Academy of Sleep Medicine - Insomnia:  https://sleepeducation.org/sleep-disorders/insomnia       ALCOHOL & DRUG USE COUNSELING:     Preventing Excessive Alcohol Use (CDC):  https://www.cdc.gov/alcohol/fact-sheets/moderate-drinking.htm#:~:text=To%20reduce%20the%20risk%20of,days%20when%20alcohol%20is%20consumed.     [x] Alcohol consumption is associated with a variety of short- and long-term health risks, including motor vehicle crashes, violence, sexual risk behaviors, high blood pressure, and various cancers (e.g., breast cancer).  [x] The risk of these harms increases with the amount of alcohol you drink. For some conditions, like some cancers, the risk increases even at very low levels of alcohol consumption (less than 1 drink).  [x] To reduce the risk of alcohol-related harms, the 3328-4748 Dietary Guidelines for Americans recommends that adults of legal drinking age can choose not to drink, or to drink in moderation by limiting intake to 2 drinks or less in a day for men or 1 drink or less in a day for women, on days when alcohol is consumed.  [x] The Guidelines also do not recommend that individuals who do not drink alcohol start drinking for any reason and that if adults of legal drinking age choose to drink alcoholic beverages, drinking less is better for health than drinking more.  [x] The Guidelines note that some people should not drink alcohol at all, such as:  - If they are pregnant or might be pregnant.  - If they are younger than age 21.  - If they have certain medical conditions or are taking certain medications that can interact with alcohol.  - If they are recovering from an alcohol use disorder or if they are unable to control the amount they drink.  [x] The  "Guidelines also note that not drinking alcohol is the safest option for women who are lactating. Generally, moderate consumption of alcoholic beverages by a woman who is lactating (up to 1 standard drink in a day) is not known to be harmful to the infant, especially if the woman waits at least 2 hours after a single drink before nursing or expressing breast milk. Women considering consuming alcohol during lactation should talk to their healthcare provider.  [x] The Guidelines note, Emerging evidence suggests that even drinking within the recommended limits may increase the overall risk of death from various causes, such as from several types of cancer and some forms of cardiovascular disease. Alcohol has been found to increase risk for cancer, and for some types of cancer, the risk increases even at low levels of alcohol consumption (less than 1 drink in a day).  [x] Although past studies have indicated that moderate alcohol consumption has protective health benefits (e.g., reducing risk of heart disease), recent studies show this may not be true.  [x] Its important to focus on the amount people drink on the days that they drink. Even if women consume an average of 1 drink per day or men consume an average of 2 drinks per day, binge drinking increases the risk of experiencing alcohol-related harm in the short-term and in the future.    Drinking Levels Defined (NIAAA):  https://www.niaaa.nih.gov/alcohol-health/overview-alcohol-consumption/moderate-binge-drinking     Drinking in Moderation:  According to the "Dietary Guidelines for Americans 5162-8791, U.S. Department of Health and Human Services and U.S. Department of Agriculture, adults of legal drinking age can choose not to drink or to drink in moderation by limiting intake to 2 drinks or less in a day for men and 1 drink or less in a day for women, when alcohol is consumed. Drinking less is better for health than drinking more.    Binge Drinking:  NIAAA " defines binge drinking as a pattern of drinking alcohol that brings blood alcohol concentration (LANIE) to 0.08 percent - or 0.08 grams of alcohol per deciliter - or higher.  For a typical adult, this pattern corresponds to consuming 5 or more drinks (male), or 4 or more drinks (female), in about 2 hours.    The Substance Abuse and Mental Health Services Administration (SAMHSA), which conducts the annual National Survey on Drug Use and Health (NSDUH), defines binge drinking as 5 or more alcoholic drinks for males or 4 or more alcoholic drinks for females on the same occasion (i.e., at the same time or within a couple of hours of each other) on at least 1 day in the past month.    Heavy Alcohol Use:  NIAAA defines heavy drinking as follows:  - For men, consuming more than 4 drinks on any day or more than 14 drinks per week.  - For women, consuming more than 3 drinks on any day or more than 7 drinks per week.     [facility] defines heavy alcohol use as binge drinking on 5 or more days in the past month.    Patterns of Drinking Associated with Alcohol Use Disorder:  Binge drinking and heavy alcohol use can increase an individual's risk of alcohol use disorder.    Certain people should avoid alcohol completely, including those who:  - Plan to drive or operate machinery, or participate in activities that require skill, coordination, and alertness.  - Take certain over-the-counter or prescription medications.  - Have certain medical conditions.  - Are recovering from alcohol use disorder or are unable to control the amount that they drink.  - Are younger than age 21.  - Are pregnant or may become pregnant.    U.S. Standard Drink  12 oz beer   (5% ABV) 8 oz malt liquor   (7% ABV) 5 oz wine   (12% ABV) 1.5 oz 80-proof distilled spirit  (40% ABV)        Heroin use harm reduction:  1. Carry naloxone. When using heroin, make sure you have at least one dose of naloxone - the overdose reversal drug - and have it in plain view.  Understand how to give it.  2. Try a small dose first. It is best to first try a small amount of the heroin to check the effect.  3. Dont use heroin alone. Always use heroin with someone else and take turns while using.    It is possible to overdose with heroin whether you are snorting, injecting or using it in another form.    Signs of an overdose or emergency:   - The person is awake but unable to talk.  - Their body is limp.  - Their breathing is shallow or slow or stopped.  - Their skin is pale, ashen or clammy/sweaty.  - They are unconscious.    In case of emergency, give naloxone. If you suspect the heroin may contain fentanyl, administer more than one dose. Seek medical help even if naloxone has been given. Call 911 for help.      ADHD TREATMENT AND STIMULANT MEDICATIONS:     [facility] Prescription Stimulants Drug Facts  CMS Stimulant and Related Medications: Use in Adults  GAIL Drug Fact Sheets: Stimulants  FDA Drug Safety Communication: Stimulants  [facility] ADHD  WebMD ADHD Medications and Side Effects  [facility]: ADHD Medication      SHARED DECISION MAKING & INFORMED CONSENT:     Shared medical decision making and informed consent are the hallmark and bedrock of excellent clinical care.  During the encounter, shared medical decision making was employed and informed consent was obtained, to the degree possible, whenever feasible, appropriate and relevant. Those interventions are supplemented here with written materials, detailing the topics in more depth.       PSYCHOEDUCATION:     Psychoeducation pertaining to the following -     Diagnosis Etiology Disease Processes Natural Progression   Treatment Options Time Course Safety Netting Informed Consent   Intended Benefits of Medication Expectable Adverse Effects Target Symptoms for Treatment Alternatives to Current Treatment   Shared   Decision Making Risk Mitigation Strategies Harm Reduction Techniques Associated Bio-Med Complications     - can be further  discussed and reviewed (you can also access additional information through the provided resources in this document).      Effective communication is essential in order to engage in shared medical decision making.  If you had difficulty understanding anything during your encounter or in this supplementary document, please contact your providers through the [facility] portal at [email or web address] or call [phone].     [place] Dictionary  https://dictionary.vesna.org/us       It can be easy to miss, forget, or misremember important important information that was discussed during the session - especially when you're stressed, upset, or don't feel well.  If you or a representative have any additional questions, concerns, or topics to discuss - please contact your providers through the [facility] portal at [email or web address] or call [phone].    Memory Loss  https://www.eMarketer.[employer]/brain/memory-loss    Causes of Memory Loss  https://www.Gamma Basics/what-causes-memory-loss-2826997    Memory loss: When to seek help  https://www.mayoclinic.org/diseases-conditions/alzheimers-disease/in-depth/memory-loss/art-75621905    Memory, Forgetfulness, and Aging: What's Normal and What's Not?  https://www.jose alfredo.nih.gov/health/memory-forgetfulness-and-aging-whats-normal-and-whats-not    Depression and Memory Loss  https://www.Zjdg.cn.[employer]/health/depression/depression-and-memory-loss    The Relationship Between Anxiety and Memory Loss  https://www.[employer]book.[facility]/academics/blog-posts/the-relationship-between-anxiety-and-memory-loss     PRESCRIPTION DRUG MANAGEMENT:     Prescription Drug Management entails the following:  [x] The review, recommendation, or consideration without recommendation of medications during the encounter.  [x] Discussion (to the extent possible) with the patient and/or other interested parties of the diagnosis, target symptoms, intended outcomes, and possible benefits and risks of medication, as  well as alternatives (including no treatment), if not otherwise known or stated prior.  [x] Discussion (to the extent possible) with the patient and/or other interested parties of possible expectable adverse effects of any proposed individual psychotropic agents, as well as the inherent unpredictability of treatment, if not otherwise known or stated prior.  [x] Informed consent is sought from the patient (and/or guardian/designated decision maker, if applicable) after a thorough discussion (to the extent possible) of the aforementioned points outlined above.  [x] The provision of counseling (to the extent possible) to the patient and/or other interested parties on the importance of full compliance with any prescribed medication, if not otherwise known or stated prior.    Information on psychotropic medication can be found at:   National [place] of Mental Health: Information on Mental Health Medications      RISK MITIGATION, HARM REDUCTION & SAFETY NETTING:     Risk Mitigation Strategies, Harm Reduction Techniques, and Safety Netting are important interventions that can reduce acute and chronic risk.  As such, opportunities were sought to incorporate psychoeducation and practical advice pertaining to these topics into the encounter, to the degree possible, whenever feasible, appropriate and relevant.  Those interventions are supplemented here with written materials, detailing the topics in more depth.       RISK MITIGATION STRATEGIES:     Risk mitigation strategies are used to reduce the likelihood of future episodes of suicide, homicide, violence, and/or other problematic behaviors (e.g. self-injurious, risky, addictive, compulsive, impulsive). The following are examples of risk mitigation strategies which you can employ in order to reduce your overall burden of risk.     [x] Treatment of underlying psychopathology driving acute and chronic risk to the extent possible.  [x] Use of self administered rating scales  and journaling to assist in risk tracking.  [x] Exploration of protective factors to potentially counterbalance risk.  [x] Identification and avoidance of triggers and situations that increase risk, including excessive alcohol and drug use.  [x] Timely follow up and ongoing treatment of mental health issues moving forward.  [x] Full compliance with medication regimen.  [x] A good working knowledge of your medication regimen, including specific instructions on the administration of the medications.  [x] Consultation with an appropriate medical provider prior to altering or deviating from these instructions on your own.  [x] Active involvement and participation of family and natural support wherever feasible and possible.  [x] Development and review of coping strategies that can be immediately deployed in times of acute crisis.  [x] Implementation of home safety practices and the removal/reduction of access to lethal means (including, but not limited to, firearms, certain types and quantities of medication, poisons, or other methods you may have contemplated or identified).  [x] Collaborative development of a written safety plan with your treatment team and loved ones that can be immediately referred to in times of acute crisis.  [x] Utilization of a safety contract to engage your treatment team and further assess/manage risk.  [x] A good working knowledge of how to access emergency treatment in times of acute crisis.  [x] Utilization of suicide hotlines number (148) and resources in times of crisis.    If you require further information pertaining to the issues outlined above, please reach out to your providers through the [facility] portal at [email or web address], or call [phone] to discuss.  See resource list for additional material.      SAFETY NETTING:     In healthcare, safety netting refers to the provision of information to help patients or carers identify the need to consult a health care professional  if a health concern arises or changes.  The relevance of this advice is most obvious with chronic mental illnesses, as their dynamic nature, with symptoms and signs emerging at different times and in different combinations, makes safety netting particularly important.  Specific safety net advice for you includes the following:    [x] The existence of uncertainty. Mental health diagnoses and conditions contain at least some degree of uncertainty - knowing this, you should feel empowered to reconsult if necessary.  [x] What exactly to look out for. Given the recognised risk of possible deterioration or the development of complications, you should become familiar with the specific clinical features (including red flags) to look out for.    [x] How exactly to seek further help. You should know how and where to seek further help if needed.  Make a plan in advance and keep it handy.  It's also a good idea to share the plan with your treatment providers and loved ones.  [x] What to expect about time course. Mental health diagnoses and conditions often have an expected time course, which is important information for you to know.  However, if your difficulties do not conform to this time line and concerns arise, do not delay seeking further medical advice.    If you require further information pertaining to the issues outlined above, please reach out to your providers through the [facility] portal at [email or web address], or call [phone] to discuss.  See resource list for additional material.      HARM REDUCTION:     Harm Reduction techniques are used in an effort to reduce negative consequences associated with risky and maladaptive behaviors, until cessation of the problematic behaviors can be established.  Harm reduction is best thought of as a journey and not a destination; it is not an endorsement of problematic behavior, but an acknowledgement and recognition of the step-by-step nature of recovery.      Although  commonly employed in working with people who suffer with drug addiction, harm reduction can be more broadly applied to any problematic behavior.    Harm Reduction and Substance Abuse:  [x] Incorporates a spectrum of strategies that includes safer use, managed use, abstinence, meeting people who use drugs where theyre at, and addressing conditions of use along with the use itself.  [x] Accepts, for better or worse, that licit and illicit drug use is part of our world and chooses to work to minimize its harmful effects rather than simply ignore or condemn them.  [x] Understands drug use as a complex, multi-faceted phenomenon that encompasses a continuum of behaviors from severe use to total abstinence, and acknowledges that some ways of using drugs are clearly safer than others.  [x] Calls for the non-judgmental, non-coercive provision of services and resources to people who use drugs and the communities in which they live in order to assist them in reducing attendant harm.  [x] Affirms people who use drugs themselves as the primary agents of reducing the harms of their drug use and seeks to empower them to share information and support each other in strategies which meet their actual conditions of use.  [x] Does not attempt to minimize or ignore the real and tragic harm and danger that can be associated with illicit drug use.  [x] Meets people where they are, but seeks to not leave them there.  [x] Examples of specific interventions include, but are not limited to, narcan (naloxone), medication assisted treatment, syringe access, overdose prevention, and safer drug use techniques.    Key Harm Reduction Strategies: Opioid Use Disorder  [x] Safe Injection Sites & Equipment  [x] Managed Use  [x] Syringe Exchange Programs  [x] Fentanyl Test Strips  [x] Pharmacotherapy/Medication Assisted Treatment  [x] Narcan  [x] Good Temple Laws  [x] Treatment Instead of penitentiary  [x] Diversion Programs  [x] Overdose Education  [x]  "Abstinence    Whether or not you struggle with substance abuse, any and all opportunities to employ harm reduction techniques to address difficult to change problematic behaviors should be sought and implemented - whenever and wherever feasible, relevant and applicable. Additionally, harm reduction techniques can be applied broadly, and are relevant for a multitude of situations - even those that do not involve problematic or maladaptive behaviors.     EXAMPLES OF HARM REDUCTION IN OTHER AREAS  SUN SCREEN SEAT BELTS SPEED LIMITS BIRTH CONTROL        If you require further information pertaining to the issues outlined above, please reach out to your providers through the [facility] portal at [email or web address], or call [phone] to discuss.  See resource list for additional material.      FIREARM SAFETY:     THE SIX BASIC GUN SAFETY RULES  There are six basic gun safety rules for gun owners to understand and practice at all times:  Treat all guns as if they are loaded. Always assume that a gun is loaded even if you think it is unloaded. Every time a gun is handled for any reason, check to see that it is unloaded. If you are unable to check a gun to see if it is unloaded, leave it alone and seek help from someone more knowledgeable about guns.  Keep the gun pointed in the safest possible direction. Always be aware of where a gun is pointing. A "safe direction" is one where an accidental discharge of the gun will not cause injury or damage. Only point a gun at an object you intend to shoot. Never point a gun toward yourself or another person.  Keep your finger off the trigger until you are ready to shoot. Always keep your finger off the trigger and outside the trigger guard until you are ready to shoot. Even though it may be comfortable to rest your finger on the trigger, it also is unsafe. If you are moving around with your finger on the trigger and stumble or fall, you could inadvertently pull the trigger. " Sudden loud noises or movements can result in an accidental discharge because there is a natural tendency to tighten the muscles when startled. The trigger is for firing and the handle is for handling.  Know your target, its surroundings and beyond. Check that the areas in front of and behind your target are safe before shooting. Be aware that if the bullet misses or completely passes through the target, it could strike a person or object. Identify the target and make sure it is what you intend to shoot. If you are in doubt, DON'T SHOOT! Never fire at a target that is only a movement, color, sound or unidentifiable shape. Be aware of all the people around you before you shoot.  Know how to properly operate your gun. It is important to become thoroughly familiar with your gun. You should know its mechanical characteristics including how to properly load, unload and clear a malfunction from your gun. Obviously, not all guns are mechanically the same. Never assume that what applies to one make or model is exactly applicable to another. You should direct questions regarding the operation of your gun to your firearms dealer, or contact the  directly.  Store your gun safely and securely to prevent unauthorized use. Guns and ammunition should be stored separately. When the gun is not in your hands, you must still think of safety. Use an approved firearms safety device on the gun, such as a trigger lock or cable lock, so it cannot be fired. Store it unloaded in a locked container, such as an approved lock box or a gun safe. Store your gun in a different location than the ammunition. For maximum safety you should use both a locking device and a storage container.    ADDITIONAL SAFETY POINTS  The six basic safety rules are the foundational rules for gun safety. However, there are additional safety points that must not be overlooked.  [x] Never handle a gun when you are in an emotional state such as anger or  "depression. Your judgment may be impaired. If you have acute or chronic suicidal ideation, a suicide plan, or suicidal intent, have firearms removed and your access restricted by a trusted loved one or other responsible individual or agency.  [x] Never shoot a gun in celebration (the Fourth of July or New Year's Kayli, for example). Not only is this unsafe, but it is generally illegal. A bullet fired into the air will return to the ground with enough speed to cause injury or death.  [x] Do not shoot at water, flat or hard surfaces. The bullet can ricochet and hit someone or something other than the target.  [x] Hand your gun to someone only after you verify that it is unloaded and the cylinder or action is open. Take a gun from someone only after you verify that it is unloaded and the cylinder or action is open.  [x] Guns, alcohol and drugs don't mix. Alcohol and drugs can negatively affect judgment as well as physical coordination. Alcohol and any other substance likely to impair normal mental or physical functions should not be used before or while handling guns. Avoid handling and using your gun when you are taking medications that cause drowsiness or include a warning to not operate machinery while taking this drug.   [x] The loud noise from a fired gun can cause hearing damage, and the debris and hot gas that is often emitted can result in eye injury. Always wear ear and eye protection when shooting a gun.      GUNS AND CHILDREN - FIREARM OWNER RESPONSIBILITIES    You Cannot Be Too Careful with Children and Guns  [x] There is no such thing as being too careful with children and guns. Never assume that simply because a toddler may lack finger strength, they can't pull the trigger. A child's thumb has twice the strength of the other fingers. When a toddler's thumb "pushes" against a trigger, invariably the barrel of the gun is pointing directly at the child's face. NEVER leave a firearm lying around the house.  [x] " "Child safety precautions still apply even if you have no children or if your children have grown to adulthood and left home. A nephew, niece, neighbor's child or a grandchild may come to visit. Practice gun safety at all times.  [x] To prevent injury or death caused by improper storage of guns in a home where children are likely to be present, you should store all guns unloaded, lock them with a firearms safety device and store them in a locked container. Ammunition should be stored in a location separate from the gun.    Talking to Children About Guns  [x] Children are naturally curious about things they don't know about or think are "forbidden." When a child asks questions or begins to act out "gun play," you may want to address his or her curiosity by answering the questions as honestly and openly as possible. This will remove the mystery and reduce the natural curiosity. Also, it is important to remember to talk to children in a manner they can relate to and understand. This is very important, especially when teaching children about the difference between "real" and "make-believe." Let children know that, even though they may look the same, real guns are very different than toy guns. A real gun will hurt or kill someone who is shot.    Instill a Mind Set of Safety and Responsibility  [x] The American Academy of Pediatrics reports that adolescence is a highly vulnerable stage in life for teenagers struggling to develop traits of identity, independence and autonomy. Children, of course, are both naturally curious and innocently unaware of many dangers around them. Thus, adolescents as well as children may not be sufficiently safeguarded by cautionary words, however frequent. Contrary actions can completely undermine good advice. A "Do as I say and not as I do" approach to gun safety is both irresponsible and dangerous.  [x] Remember that actions speak louder than words. Children learn most by observing the adults " around them. By practicing safe conduct you will also be teaching safe conduct.    Safety and Storage Devices  [x] If you decide to keep a firearm in your home you must consider the issue of how to store the firearm in a safe and secure manner. There are a variety of safety and storage devices currently available to the public in a wide range of prices. Some devices are locking mechanisms designed to keep the firearm from being loaded or fired, but don't prevent the firearm from being handled or stolen. There are also locking storage containers that hold the firearm out of sight. For maximum safety you should use both a firearm safety device and a locking storage container to store your unloaded firearm.   Two of the most common locking mechanisms are trigger locks and cable locks. Trigger locks are typically two-piece devices that fit around the trigger and trigger guard to prevent access to the trigger. One side has a post that fits into a hole in the other side. They are locked by a key or combination locking mechanism. Cable locks typically work by looping a strong steel cable through the action of the firearm to block the firearm's operation and prevent accidental firing. However, neither trigger locks nor cable locks are designed to prevent access to the firearm.   [x] Smaller lock boxes and larger gun safes are two of the most common types of locking storage containers. One advantage of lock boxes and gun safes is that they are designed to completely prevent unintended handling and removal of a firearm. Lock boxes are generally constructed of sturdy, high-grade metal opened by either a key or combination lock. Gun safes are quite heavy, usually weighing at least 50 pounds. While gun safes are typically the most expensive firearm storage devices, they are generally more reliable and secure.     Remember: Safety and storage devices are only as secure as the precautions you take to protect the key or combination  to the lock.    RULES FOR KIDS  Adults should be aware that a child could discover a gun when a parent or another adult is not present. This could happen in the child's own home; the home of a neighbor, friend or relative; or in a public place such as a school or park. If this should happen, a child should know the following rules and be taught to practice them.   Stop  The first rule for a child to follow if he/she finds or sees a gun is to stop what he/she is doing.  Don't Touch!  The second rule is for a child not to touch a gun he/she finds or sees. A child may think the best thing to do if he/she finds a gun is to pick it up and take it to an adult. A child needs to know he/she should NEVER touch a gun he/she may find or see.  Leave the Area  The third rule is to immediately leave the area. This would include never taking a gun away from another child or trying to stop someone from using gun.  Tell an Adult  The last rule is for a child to tell an adult about the gun he/she has seen. This includes times when other kids are playing with or shooting a gun.     METHODS OF CHILDPROOFING YOUR FIREARM  As a responsible handgun owner, you must recognize the need and be aware of the methods of childproofing your handgun, whether or not you have children.  Whenever children could be around, whether your own, or a friend's, relative's or neighbor's, additional safety steps should be taken when storing firearms and ammunition in your home.  [x] Always store your firearm unloaded.  [x] Use a firearms safety device AND store the firearm in a locked container.  [x] Store the ammunition separately in a locked container.  Always storing your firearm securely is the best method of childproofing your firearm; however, your choice of a storage place can add another element of safety. Carefully choose the storage place in your home especially if children may be around.  [x] Do not store your firearm where it is visible.  [x] Do  not store your firearm in a bedside table, under your mattress or pillow, or on a closet shelf.  [x] Do not store your firearm among your valuables (such as jewelry or cameras) unless it is locked in a secure container.  [x] Consider storing firearms not possessed for self-defense in a safe and secure manner away from the home.    [employer] for Gun Safety:  https://www.everytown.org       Gun Violence: Prediction, Prevention and Policy  American Psychological Association Panel of Experts Report  https://www.apa.org/pubs/reports/gun-violence-report.pdf     If you require further information pertaining to any of the issues outlined above, please reach out to your providers through the [facility] portal at [email or web address], or call [phone] to discuss.  See resource list for additional material.      IN CASE OF SUICIDAL THINKING, call the [place] Suicide Hotline Number: 988    988 Suicide & Crisis Lifeline: 988 , 7-358-922-TALK (8255)  Provides 24/7, free and confidential support for people in distress, prevention and crisis resources for you or your loved ones, and best practices for professionals.    Call, text or chat.  https://GEOCOMtms.C4M              REFERRAL RECOMMENDATIONS FOR SUBSTANCE ABUSE & MENTAL HEALTH      IN CASE OF SUICIDAL THINKING, call the [place] Suicide Hotline Number: 988    988 Suicide & Crisis Lifeline: 988 , 5-123-480-TALK (8255)  https://GEOCOMtms.org       SUBSTANCE ABUSE:     [facility] (formerly known as the ABU)  [x] [phone], Option 2  [x] [address], [place] [address]  [x] [email or web address]  [x] [facility] delivers comprehensive and collaborative treatment for alcohol and substance use disorders.  Excellent program for working professionals or anyone else seeking recovery.  [x] Requires insurance approval prior to starting program, call number above for more  information.  [x] Intensive Outpatient Rehabilitation Program - M-F 9am-3pm - daily groups with psychologists and social workers, sessions with MDs 3x per week   [x] Ambulatory detox and dual diagnosis available      SUBOXONE:     NOTE: some Suboxone clinics require their clients to participate in a structured program (such as an IOP) in order to be prescribed Suboxone.  Some clinics have a long waiting list.  Most of these clinics do not accept walk-in clients, so call first to to learn what must be done to get started on Suboxone.    [facility] - [phone] (can do Sublocade)  [address], [place] [address]    [facility]  [address], [place]  [phone]    [facility] - [phone] (can do Sublocade)  [address] [place] [address]    [facility]  [address] [place]  [phone]     [facility] (very short waiting list, may accept some walk-in's but call first if possible)  [address], [place] [address]  [phone]; [phone]    [facility]   [address] [place]    [phone]    [facility] (can usually be seen within a week but is cash only for appointment)  [address] [place] [address] [place], [employer])  [address] [place], [place]  [phone]    [facility] ([facility])  [address], [place] [address]  [phone]    [place], [place]:    [facility] - [address] [place] [address] - Tel: [phone]    Walt Oh - [address] [place] [address] - Tel: [phone]    Gary Epstein - [address] [place] [address] - Tel: [phone]    Pernell Bishop - [address] [place] [address] - Tel: [phone]    Alen Cabrera - [address] [place] [address] - Tel: [phone]    [place], [place]:     Dr. Desiree Bruce and Dr. Guille Flowers - [address], [place] - Tel: [phone]    Dr. Aleah Duncan - [address]  [place], [place] - Tel: [phone]    Dr. Pasquale Dobson - Tel: [phone]    Dr. Gilbert Okeefe [facility] - [phone]      METHADONE:     [facility] (the only methadone clinic in the [facility], has two locations)  [x] [facility] - [address] [place] [address], [phone]  [x] [facility] [place] [address], [phone]      12 STEP PROGRAMS (and similar):     Alcoholics Anonymous (local)  [x] [phone]  [x] www.[place].org for schedules for in-person and online meetings  [x] There are AA meetings throughout the day all over [facility]  [x] AA costs nothing to attend; they pass a basket for donations but this is not required    Narcotics Anonymous  [x] [phone]  [x] www.noana.org  [x] There are NA meetings throughout the day all over [facility]  [x] NA costs nothing to attend; they pass a basket for donations but this is not required    Alcoholics Anonymous Online Intergroup (national)  [x] www.aa-intergroup.org  [x] Good resource for large, nation-wide meetings  [x] Can also attend smaller, local meetings in other cities  [x] Countless meetings all day and all night  [x] AA costs nothing to attend; they pass a basket for donations but this is not required    [facility] - 24/7 zoom meetings for women and coed - sign on anytime, anywhere!  [email or web address]    Online Intergroup of AA - [address] - 24/7 zoom meetings  https://aa-intergroup.org/meetings/    LOOKING FOR AN ALTERNATIVE TO 12 STEP PROGRAMS - check out:  [facility]: https://www.smartrecovery.org/about-us  [facility]: [email or web address]      DETOX UNITS (USUALLY 5-7 DAYS):     [facility]: [address], [place]  [phone], call first to ensure bed availability    [facility]: [address] [place]  [phone], Option 1, call first to ensure bed availability    [place] [facility]: [address] [place]   [phone] (intake by appointment only)    [facility]: [address], [facility]  [phone]      INTENSIVE OUTPATIENT PROGRAMS:     [facility] (formerly known as the ABU)  [x] [phone], Option 2  [x] [address] [place] [address], [facility] [address]  [x] [email or web address]  [x] [facility] delivers comprehensive and collaborative treatment for alcohol and substance use disorders.  Excellent program for working professionals or anyone else seeking recovery.  [x] Requires insurance approval prior to starting program, call number above for more information.  [x] Intensive Outpatient Rehabilitation Program - M-F 9am-3pm - daily groups with psychologists and social workers, sessions with MDs 3x per week   [x] Ambulatory detox and dual diagnosis available    [facility] Intensive [facility]patient Program  [x] [phone]  [x] [address] (the clinic not on [facility])  [x] Call number above for more info and to check insurance requirements    [facility]  [address], [place] [address] [phone]    [facility]:  [address] [place], [place] [address]?, [phone]  [address] [place], [place] [address]?, [phone]    RESIDENTIAL REHABS (USUALLY 28 DAYS):     [facility]: [address], [phone]    [facility]: [address] , [facility]  [phone] (intake by appointment only)    [facility] (men only) [address], [place], [phone]    [facility] (Female only) [address] [place], [phone]    [facility]: [address], [place], men's program [phone], women's program [phone]    [facility]: [address], [place], [phone]    [facility]: [address], [place], [place], [phone]    [facility]: Men only, [phone], [address] [facility] Collaborative Hemal Stewart,  BRIA [facility]erson[facility]: [address] [place], [place], [phone]    [facility]: [address] [place], [place],  [phone]  New Location: [address] [place] [address] [address], [place] [address], [phone]    [facility]:   ?[address] [facility]?[place], [place] [address]?[phone]    [place]: [address], [place] [address], [phone]    [place]: [place], [place], [phone]     [facility]: [place], [place], [phone]    [facility]: [place], [phone]    [facility]: [employer], [place], [phone]    [place]: [place], [phone]    [facility]: [address], [place] [address], [phone]    COMMUNITY ADDICTION CLINICS:     ACER: [address] [facility] [place], [phone] -or- [address] [place] [address]    [facility] [place]: [address] [facility], [place]  [address]     [facility]: Clinics [phone]; Crisis [phone]    [facility]: [address] [place] [address]    [facility] Behavior[facility]: [address] [place] [address]    [facility]: [address] [place] [address],    [facility]: 2nd Floor [address] [place] [address]    [facility]: [address], [place] [address]    [facility] [place], [address], [place], [place] [address] [place] [address] Behavioral Health Center: [address], [facility] [phone]  (serves youth 16-23 years old)    [facility]: Carlie/St. Ignacio/Velia/[place]/[facility] [phone]    [facility]: [address] [facility], [facility] [phone]    [facility]: [address], [facility] [phone]    [facility]: [facility], [facility],  [phone]     [facility]: [address] [facility], [phone], [phone]    RESOURCES IN OTHER [facility]:     [facility]: [address] [place], [phone], [phone]    [facility]: [address] [facility], [address], [phone]    [facility], [address] [place] [address], [phone]    [facility] Behavioral Health: [address], [phone]    [facility], [address] [facility], [phone] ([facility])    [facility], [address] [facility], [phone] ([facility])    [facility], [address], [place], [phone] ([place], [place], and [facility])    [facility], [address] [facility], [phone] ([facility])    [facility] Hotline [phone], [phone]    [facility], [address] [facility], [address] [facility]., [address], [facility]al Health [place], [address] [facility] [place], [address] [facility] [address]., [facility], [address], [place]    [facility] Human [facility], [address], [place] [phone]    [facility], [address] [facility] [phone]    [facility] Recovery, [address] [facility], [phone]    [facility] for Addiction Recovery, [address] [facility], [phone]      Faroese SPEAKING (en español):     Información de la reunión de Alcohólicos Anónimos  Calos [facility], 10:00 am  Habla español  Esta reunión está abierta y cualquiera puede asistir.    Lao speaking Alcoholics  "anonymous meetings:  El "Calos [place] AA Skype" es un calos on line de Alcohólicos Anónimos en español. El calos es dax, gratuito y virtual a través de Skype Audio. El calos funciona mediante jabari llamada grupal de voz, por lo que no se utiliza la videollamada, ni se pueden cesar las imágenes o rostros de los participantes. Hace vera años y medio abrimos el primer Calos de AA por Skype en [place], juan pablo actualmente asisten personas desde [place], [place], [place], [place], [place], [place], [place], [place], [place], [place], [place] y [place], entre otros.    El calos es muy útil para los alcohólicos que residen en lugares donde no se celebran reuniones de AA, o residen en lugares donde las reuniones de AA son un número limitado de días a la semana, o para aquellos compañeros que se hayan de viaje o que, por cualquier motivo, se hayan convalecientes y no pueden desplazarse. Todos los días nos reuniones a las 21:00 (hora española)    Podéis obtener más información sobre el calos y khalif sesiones en la página web [email or web address]      MENTAL HEALTH:     [facility] Outpatient [facility]  [phone]    [facility] - General Psychiatry Intensive Outpatient Program  [facility] (formerly known as the BMU)  [phone], option 3    [facility] - Dual Diagnosis Intensive Outpatient Program  [facility] (formerly known as the ABU)  [phone], option 2      [facility] MENTAL HEALTH CENTERS:     [facility]  (aka [facility], aka [facility])  Serves [facility], and [facility] residents.  Serves uninsured patients & those with Medicaid.  Main location: [address] [place] [address]  [phone]  Walk-in's available during regular business hours.  24/7 Crisis Line: [phone]    [facility] " [facility]  (aka [facility], [facility])  Serves [facility].  Serves uninsured patients, those with Medicaid and some private plans.  Walk-in's available during regular business hours.  Primary care services available as well.  [facility]: [address] [facility] [address] [place] [address];  [phone]  [place]: [address] [place] [address];  [phone]  24/7 Crisis Line: [phone]    [facility]  Serves uninsured patients & those with Medicaid, call for more info.  Primary care, pediatrics, HIV treatment, and dentistry services available as well.  Three locations.  [phone]    [employer] [facility] [employer] [facility] [place]?Corporate Office  Serves patients with Medicaid, Medicare, and private insurance  [address]  [place],?[place] [address] [phone]    [facility]  Serves uninsured on a sliding scale, as well as Medicaid, Medicare, and private plans.  Eight locations around the [facility] area.  [phone]    [facility]  Serves uninsured patients & those with Medicaid, private insurances.  Primary care available as well.  [phone]  [address] [place] [address]    [facility]  Serves veterans who were honorably discharged.  [address] [place] [address]  [phone]  24/7 Veterans Crisis Line: [phone] (Press 1)    If you have private insurance and need to find a specialist, please contact your insurance network to request a list of providers covered by your benefits.      MENTAL HEALTH/ADDICTIVE DISORDERS:     AA ([phone]), NA ([phone])   National Suicide Prevention Lifeline- Call 1-132.880.4394 Available 24 hours everyday  [facility] [phone]; Crisis Line [phone] - Call for options A-F:  Intensive Outpatient Treatment/ Day programs   [facility], please contact   [facility], please contact  [phone] or [phone] to speak with an admissions counselor.  [facility] Group (Methadone Maintenance)   [address] [place] [address], [phone]  [address] [place] [address] [phone]  [facility], [address] [address], [phone]  [facility] [phone]  [place] [place] please contact [phone]  [facility], [address] [facility], 4th [place] [address] Phone: [phone]   Acer  [place] Office: [address] [place] [address], [phone]  [place] Office: [facility], [address], [place] [address], [phone]  [place] Office: [address] [place] [address] [phone]    Outpatient Substance Abuse Treatment   [facility] Health Group (Methadone Maintenance)   [address] [place] [address], [phone]  [address], [place] [address] [phone]  [facility], [address] [place] [address], [phone]  Acer  [place] Office: [address] [place] [address], [phone]  [place] Office: [facility], [address], [place] [address], [phone]  [place] Office: [address] [facility] [address], [place] [address] [phone]  [place] [facility], [address] [place] [address] [phone]   [facility], [address] [facility], [address], [phone]  [facility] Addiction Recover, [address] [place], [place] [phone]    Residential Substance Abuse Treatment   [facility] [address] [place] [address], [phone] or [phone]  [facility], [employer] [facility], [phone]  [facility] (men only) [address] [place] [address], [phone]  Women at the [facility] (women only) [address] [place] [address] [phone]  [address] [place] [address] [phone]  [facility] (women only), intakes at [address] [facility], [phone]  [facility] (7-day program, $100, [address], [phone], [phone], [phone])  [place] [facility] (Men only, [phone]), [address] [place] (Vets*/Non-Vets)  [facility] (Men only, $400/month program fee) [address] [facility] Marilu [address], [phone]  [facility] (Women over age 39 only), [address] [facility], [phone]    Out of Area:    [place], [address] [place] [address] 36, [place], [place] [phone])  [facility] Recovery Program (men only), [address] [facility] [place] [address], [phone]  [facility], [address] [place], [place] ([phone])  [place], [address] [place] [address] Dr. [place], [place] ([phone])  [facility], [address] [facility], [phone]  [facility] (Women only, has to have mental illness, can be homeless or substance abuser), [phone]        DOMESTIC VIOLENCE RESOURCES:     Advocacy  [facility] (NOF[facility])  [address] [place] [address] [address]    [facility] ? [phone]  Services provided: emergency shelter, individual advocacy, information and referrals, group support, children's program, medical advocacy, forensic medical exams, primary care, legal assistance, counseling, safety planning, and caregiver support    [facility] HEALING AND EMPOWERMENT CENTER  Confidential location  [facility] ? [phone]  Services provided: short term emergency shelter, all services provided are free of charge    [facility] FOR COMMUNITY ADVOCACY  Multiple locations in [facility], and [facility] ([place], [place], and [place]. [place] ? [phone]  Services provided: emergency shelter, individual advocacy, information and referrals, group support, children's program, medical advocacy, legal assistance in obtaining  restraining orders, counseling, safety planning, and caregiver support    [facility]   Emergency Shelter   [phone]  Emergency Services ,Legal and Financial Assistance Services ,Housing Services ,Support Services     [facility]   [phone]  Emergency Services ,Counseling Services , Housing Services ,Support Services ,Children's Services     WOMEN WITH A VISION  [address] [facility] [place] [address]  WWAV ? [phone]  Services provided: advocacy, health education and supportive services, specializing in free healing services for marginalized groups, including LGBTQ individuals and sex workers    SEXUAL TRAUMA AWARENESS AND RESPONSE (STAR)  [address] [place] [address]    STAR ? [phone]  Services provided: individual advocacy, information and referrals, group support, medical advocacy, legal assistance, counseling, and safety planning for survivors of sexual assault    [facility]  [address] [place] [address]  [facility] Forensic Program ? [phone]  Services provided: free forensic medical exams for sexual assault and domestic violence, which can be performed up to 5 days after an incident. It is not necessary to make a police report to receive a forensic medical exam    Legal  [facility]  [address] [facility] [address] [place] [address]  Project SAVE ? [phone]  Services provided: free emergency legal representation for survivors of doemstic violence residing in [facility]. Legal services may include temporary restraining orders, temporary child support, custody, and use of property    [facility] LEGAL SERVICES (LS)  [address] [facility] [address] [place] [address]  SLLS ? [phone]  Services provided: free legal representation for survivors of domestic violence residing in [facility]. Legal services may include temporary child support, custody, and divorce      HOTLINES:     [place]  [facility] DOMESTIC VIOLENCE HOTLINE  [phone]    Services provided: free and confidential hotline for victims and survivors of domestic violence. All calls will be routed to a domestic violence service provided in the victim or survivor's area    [facility] HUMAN TRAFFICKING HOTLINE  [phone]    Services provided: national anti-trafficking hotline serving victims and survivors of human trafficking. Provides information about local resources, and access to safe space to report tips, seek services, and ask for help    VIA LINK  211 or [phone]    Service provided: counselors can provide crisis counseling. Counselors can also provide information and referrals to programs which can help with needs such as food, shelter, medical care, financial assistance, mental health services, substance abuse treatment, senior services, childcare, and more      HOMELESS SHELTERS:      Homeless shelters  [facility]  Emergency shelter for individuals and families  [address]  [phone]  [facility]  Emergency shelter for men only  Meals daily 6am, 2pm, & 6pm  Clothing, case management M-F by appointment (ID/job/housing/legal assistance), mail  [address] [facility]  [phone]  [facility]  Emergency shelter for men  [address] [facility]  [phone]  Emergency shelter for women  [address] [facility]  [phone]  Breakfast & lunch daily, dinner M-F  Case management, job counseling services   [facility]  Emergency shelter for teens and young adults up to 22yo  [address] [facility]  [phone]  [place] [facility]  Emergency shelter for women over 19yo and their kids  [address] [place] [address]  [phone]  [facility]  Day program, meals M-F 1PM (arrive early)  Showers, laundry, hygiene kits, showers, phones, , notary services, case management, ID assistance  [address] [facility]  [phone] M-F 8am-2:30pm  [employer] [facility]  Day program  MTWF  7:30am-3:30pm,  8:30am-3:30pm  Crisis intervention, employment assistance, food/clothing, hygiene kits, bus tokens, mail  [address]  [phone]  [facility]  Mobile outreach for homeless persons in [facility]  [phone]  Healthcare for the Homeless  Primary healthcare, case management, dental services, TB placement  Call ahead  [address]  2nd Floor  [phone]  Jovanna at the [facility]  Connects homeless people with their loved ones in other cities by providing transportation costs   [phone]      [place] RESOURCES:     [place] [facility] Crisis Response Team:    Region 12 ([place], [place], [place], and [facility]) ([facility])  [phone]      Outpatient Mental Health & Addiction Clinic Resources for both [facility] and [facility]:    [facility] Mental Healthcare Resources  Website: www.[email or web address]  Main Number: 844-028-6432    [facility] ([facility] Area)  [address] ([address]) [place] [address]  [phone]    [facility])  [address] ([address]) [place] [address]  [phone]    [facility]  [address] ([address]) [place], [place] [address] [phone]    [facility]  [address] ([address] [facility]) [place] [address]  [phone]      Addiction Treatment Resources for both [facility]:    [facility] (Detox, Residential, PHP, IOP, and Aftercare Programs)  [address] [place] [address] [phone]    [facility] (Residential, IOP, Transitional Living, and Aftercare Programs)  [address] [place] [address]  [phone]    [facility] (Inpatient, Residential, Detox, IOP, Outpatient, and Aftercare Programs)  [address] [place] [address]  Drive, [place] [address]  [phone] or toll free at [phone]      Outpatient Mental Health Psychotherapy Resources for both [facility] and [place]:    Cassidy Olvera, [facility]  [address]  [place] [address]  [phone]  Specialties: Depression, Anxiety, and Life Transitions    Karen Watkins, PhD  [address]  [place] [address]  [phone]  Specialties: Testing and Evaluation, Education and Learning Disabilities, and ADHD    Eileen Burgos, [facility] [address] [place] [address]  [phone]  Specialties: Obsessive-Compulsive (OCD), Depression, and Relationship Issues    [facility] [address] [facility] [place] [address]  [phone]  Specialties: Trauma & PTSD, Mood Disorders, and Anxiety    Pretty Farmer, PhD, [facility] Counseling [address] [place] [address]  [phone]  Specialties: Family Conflict, Child, and Relationship Issues    Rupinder [facility] Counseling [place] [address] [phone]  Specialties: Anxiety, Depression, and Anger Management        IN CASE OF SUICIDAL THINKING, call the National Suicide Hotline Number: 988    988 Suicide & Crisis Lifeline: 988 , 8-422-326-TALK (8255)  Provides 24/7, free and confidential support for people in distress, prevention and crisis resources for you or your loved ones, and best practices for professionals.    Call, text or chat.  https://988Wongnailine.org

## 2023-09-18 NOTE — PROGRESS NOTES
[facility] - Telemetry [facility] Medicine  Progress Note    Patient Name: Carlito Rosario  MRN: [id number]  Patient Class: IP- Inpatient   Admission Date: 9/16/2023  Length of Stay: 2 days  Attending Physician: Miguel Ángel James DO  Primary Care Provider: Nereida, Primary Doctor        Subjective:     Principal Problem:Hydropneumothorax        HPI:  Mr. Rosario is a 28-year-old male with medical history significant for IV drug use, pulmonary septic emboli hydropneumothorax and infective endocarditis presents as transfer from [place] emergency department for Cardiothoracic surgery evaluation and VATS consideration.  Per chart review, patient was being treated for septic shock, infective endocarditis and respiratory failure at [facility] where he reportedly signed out AMA.  During his stay at OSH he required vasopressors and BiPAP on admission. Imaging at that time showed concern for septic emboli and left hydropneumothorax lung base likely adjacent to necrotic pneumonia. TTE revealed EF of 55-60 %, moderate tricuspid regurgitation and a moderate-size vegetation or mass on tricuspid valve. He was treated with vancomycin and Zosyn with plan for VATS. He was reportedly caught using some Illicit substance (thought to be heroin) and syringes were confiscated. He subsequently chose to leave [place]. Per chart review, he stated medical staff at [facility] were rude to him and he was not happy with his care.     On presentation to OSH ED, he was noted to be afebrile, tachycardic and hypotensive to 105/64. He had leukocytosis with WBC of 25.3,  and lactate 1.6. Rapid MRSA ID from blood culture was positive. Chest tube was placed and [place] was [place] [facility] [place].      Overview/Hospital Course:  Chest tube clamped on 9/17, minimal output of 25 cc recorded overnight. ID changed abx to ceftaroline and dapto on 9/17 for salvage therapy. Still fevering, blood cx still positive for MRSA as of  9/15, repeat cultures drawn on 9/17. No surgical intervention per CTS. Found to be Hep C+, RNA PCR in process.   Patient attempted to pull out CT on 9/17 and continues to discuss leaving AMA at this time. Patient with continued bacteremia and withdrawal given recent IVDU while admitted. IR evaluated patient for additional CT placement but patient reportedly wanted further discussion.       Interval History: NAEON. Attempted to pull out CT overnight. Continued anxious behavior. CIWA 11 with PRN ativan. Deferred IR catheter placement on 9/17. Patient to discuss further with father. Febrile and bacteremia continues. Continues to discuss leaving AMA this AM.     Review of Systems   Constitutional:  Positive for chills, fatigue and fever. Negative for appetite change.   Respiratory:  Negative for cough and shortness of breath.    Cardiovascular:  Negative for chest pain and leg swelling.   Gastrointestinal:  Negative for abdominal pain, diarrhea, nausea and vomiting.   Genitourinary:  Negative for dysuria, flank pain and hematuria.   Musculoskeletal:  Negative for arthralgias and myalgias.   Neurological:  Positive for weakness. Negative for dizziness.   Psychiatric/Behavioral:  Positive for behavioral problems and sleep disturbance. Negative for confusion. The patient is nervous/anxious.      Objective:     Vital Signs (Most Recent):  Temp: 98.7 °F (37.1 °C) (09/18/23 1213)  Pulse: (!) 113 (09/18/23 1213)  Resp: 18 (09/18/23 1213)  BP: (!) 109/56 (09/18/23 1213)  SpO2: 97 % (09/18/23 0809) Vital Signs (24h Range):  Temp:  [98.3 °F (36.8 °C)-102.2 °F (39 °C)] 98.7 °F (37.1 °C)  Pulse:  [] 113  Resp:  [16-34] 18  SpO2:  [92 %-100 %] 97 %  BP: ()/(51-65) 109/56     Weight: 72.1 kg (159 lb)  Body mass index is 21.56 kg/m².    Intake/Output Summary (Last 24 hours) at 9/18/2023 1224  Last data filed at 9/18/2023 0523  Gross per 24 hour   Intake 200 ml   Output 4300 ml   Net -4100 ml         Physical Exam  Vitals  and nursing note reviewed.   Constitutional:       Appearance: He is ill-appearing.      Comments: somnolence   HENT:      Head: Normocephalic.      Mouth/Throat:      Dentition: Abnormal dentition.      Pharynx: No oropharyngeal exudate.   Eyes:      General: No scleral icterus.     Pupils: Pupils are equal, round, and reactive to light.   Cardiovascular:      Rate and Rhythm: Tachycardia present.      Heart sounds: Normal heart sounds.   Pulmonary:      Breath sounds: Normal breath sounds. No wheezing or rales.      Comments: Tachypnic periodically   Abdominal:      General: Abdomen is flat.      Palpations: Abdomen is soft.   Musculoskeletal:         General: Normal range of motion.      Cervical back: Normal range of motion. No tenderness.   Lymphadenopathy:      Cervical: No cervical adenopathy.   Skin:     General: Skin is warm and dry.      Coloration: Skin is not jaundiced.      Findings: No bruising.             Significant Labs: All pertinent labs within the past 24 hours have been reviewed.    Significant Imaging: I have reviewed all pertinent imaging results/findings within the past 24 hours.      Assessment/Plan:      * Hydropneumothorax  Per OSH imaging, found to have mall left hydropneumothorax at the lung base likely due to adjacent necrotic pneumonia. Chest tube placed at OSH ED. Draining bloody fluid.     - CTS: no surgical intervention  - Chest tube care, clamped on 9/17. CXR unchanged on 9/18. CTS following with appreciated recommendations   - IR consulted for pigtail. Patient deferred consent for procedure. Discussed with patient at bedside in great detail about risk. Will be reevaluating and coordinating with IR concerning procedure      Opioid withdrawal  See IVDU      Opioid use disorder, severe, dependence  See IVDU      MRSA bacteremia  Follow cultures  Positive since 9/14. Will follow up repeat cultures on 9/18. CTS following as well      Septic shock  Resolved    See sepsis      Pneumonia  of left lower lobe due to methicillin-resistant Staphylococcus aureus (MRSA)  Patient presents as transfer from OSH with chest tube for evaluation by CTS for possible VATS. CT on 9/16: loculated hydropneumothorax. Continued bacteremia as well. Minimal drainage being followed by CTS and ID.     Plan  - Continue IV abx per ID recommendations        IV drug user  Known Heroin use. Received lorazepam in the ED. Also recent IVDU while admitted as visitor brought in heroin. Active withdrawal. Poor insight into disease and morbidity.     - opioid withdrawal order set  - psychiatry consult placed for evaluation and management of withdrawal  - will continue benzo use for management of agitation/anxiety  - Monitor for signs of withdrawal     Endocarditis of tricuspid valve  Patient with noted tricuspid vegetation on TTE at OSH and septic emboli in the setting of IVDU.     - Continue dapto/ceftaroline per ID for salvage therapy  - f/u persistently +blood cx  - no surgery per CTS at this time  - f/u echo completed today        Sepsis  This patient does have evidence of infective focus  My overall impression is sepsis.  Source: Blood  Antibiotics given-     Antibiotics (72h ago, onward)    Start     Stop Route Frequency Ordered    09/17/23 1330  DAPTOmycin (CUBICIN) 580 mg in sodium chloride 0.9% SolP 50 mL IVPB         -- IV Every 24 hours (non-standard times) 09/17/23 1225    09/17/23 1330  ceftaroline fosamiL (TEFLARO) 600 mg in dextrose 5 % in water (D5W) 50 mL IVPB (MB+)         -- IV Every 8 hours (non-standard times) 09/17/23 1225        Latest lactate reviewed-  Recent Labs   Lab 09/15/23  1839 09/15/23  2041 09/17/23  2100   LACTATE 0.8 0.6 1.2     Organ dysfunction indicated by Acute respiratory failure    Fluid challenge Actual Body weight- Patient will receive 30ml/kg actual body weight to calculate fluid bolus for treatment of septic shock.     Post- resuscitation assessment Yes Perfusion exam was performed within 6  hours of septic shock presentation after bolus shows Adequate tissue perfusion assessed by non-invasive monitoring       Will Not start Pressors- Levophed for MAP of 65  Source control achieved by:  Antibiotics    Noted positive MRSA rapid ID at OSH. Likely bacteremia in the setting of known IVDU.     - CTS recommended IR consult for pigtail catheter placement but patient deferring decision at this time. Will continue discussion  - stopped vanc on 9/17. Started dapto and ceftaroline for salvage therapy on 9/17  - F/U blood cultures. Persistent bacteremia      VTE Risk Mitigation (From admission, onward)         Ordered     enoxaparin injection 40 mg  Every 24 hours         09/16/23 1055     IP VTE LOW RISK PATIENT  Once         09/16/23 0509     Place sequential compression device  Until discontinued         09/16/23 0419                Discharge Planning   DESEAN:      Code Status: Full Code   Is the patient medically ready for discharge?:     Reason for patient still in hospital (select all that apply): Patient trending condition, Treatment, Consult recommendations and Pending disposition  Discharge Plan A: Home                  Noy James DO  Department of Hospital Medicine   [facility] - Telemetry Stepdown

## 2023-09-18 NOTE — CARE UPDATE
RAPID RESPONSE NURSE CHART REVIEW        Chart Reviewed: 09/18/2023, 4:38 PM    MRN: [id number]  Bed: 8028/8099 A    Dx: Hydropneumothorax    Carlito Rosario has a past medical history of IVDU (intravenous drug user).    Last VS: BP (!) 99/54 (Patient Position: Lying)   Pulse (!) 113   Temp 99.2 °F (37.3 °C) (Oral)   Resp 20   Ht 6' (1.829 m)   Wt 72.1 kg (159 lb)   SpO2 (!) 94%   BMI 21.56 kg/m²     24H Vital Sign Range:  Temp:  [98.3 °F (36.8 °C)-102.2 °F (39 °C)]   Pulse:  []   Resp:  [16-34]   BP: ()/(51-61)   SpO2:  [94 %-100 %]     Level of Consciousness (AVPU): alert    Recent Labs     09/17/23  0304 09/17/23  2100 09/18/23  0244   WBC 12.52 14.35* 14.06*   HGB 8.2* 8.7* 8.6*   HCT 25.3* 26.3* 25.9*    297 338       Recent Labs     09/17/23  0304 09/17/23  2100 09/18/23  0244   * 134* 136   K 3.6 3.5 3.6    103 104   CO2 25 19* 25   BUN 12 10 9   CREATININE 0.8 0.8 0.8   * 102 100   PHOS 2.8  --  3.3   MG 1.7 1.7 1.9          OXYGEN:  Flow (L/min): 2          MEWS score: 3    Bedside Get MCCALL  contacted for tachycardia and SBP 90s. Reports patient continues to be agitated and requiring anxiolytics . No additional concerns verbalized at this time. Instructed to call [phone] for further concerns or assistance.    Dorothea Velez RN

## 2023-09-18 NOTE — NURSING
He is now leaving [facility].  Attending is aware. Surgery came to the bedside to pull the chest tube.  Iv is discontinued.  Patient is now leaving the building.

## 2023-09-18 NOTE — CONSULTS
[id number]}        INITIAL VISIT: PSYCHIATRY  [facility]      ASSESSMENT AND PLAN:     DIAGNOSES & PROBLEMS:  Opioid Use Disorder, severe, dependence   Opioid Withdrawal   Intravenous Drug User    In Summary:  28 y.o. male with hx of IVDU/Opioid Use Disorder who presents as transfer to [facility] [place] transfer from OSH 2/2 infective endocarditis and hydropneumothorax. Addiction Psychiatry consulted 2/2 concerns of opioid withdrawal.       Plan:    Opioid Use Disorder, Severe, dependence    Patient is still receiving PRN Opioid medications 2/2 current medical condition and procedures; reports some interest in eventually pursuing MAT again   May consider/initiate when patient is no longer receiving opioid pain medications; note suboxone cannot be initiated until 12-24hrs after last dose of full opiate agonist     For anxiety, Would also caution around use of benzodiazepines in cases of substance use; please limit administration due to risks/concerns for cross addiction; would consider alternative options such as:   Hydroxyzine 50mg PO q8hrs PRN    Possibly Clonidine 0.1mg PO q4hr PRN (hold if SBP<90, DBP<50 or HR <60); would also consider cardiology reccs 2/2 current endocarditis     Other Opiate Withdrawal Medications for consideration:  dicylomine 10 mg q6 hours prn abdominal muscle cramps  loperamide 2 mg q 1 hour prn diarrhea (max= 16 mg/24 hours)  methocarbamol 500 mg po q 6 hours prn muscle spasm  ibuprofen 400mg po q6hrs PRN pain  zofran 4mg PO q8hrs PRN OR phenergan 12.5mg PO q8hrs PRN nausea    Will provide rehab/detox resources in AVS/discharge paperwork         PRESENTATION:     Carlito Rosario presents with the following chief complaint: alcohol and/or drug addiction    Per Chart:  28-year-old male with medical history significant for IV drug use, pulmonary septic emboli hydropneumothorax and infective endocarditis presents as transfer from [facility] emergency department for Cardiothoracic surgery  evaluation and VATS consideration.  Per chart review, patient was being treated for septic shock, infective endocarditis and respiratory failure at [facility] where he reportedly signed out AMA.  During his stay at OSH he required vasopressors and BiPAP on admission. Imaging at that time showed concern for septic emboli and left hydropneumothorax lung base likely adjacent to necrotic pneumonia. TTE revealed EF of 55-60 %, moderate tricuspid regurgitation and a moderate-size vegetation or mass on tricuspid valve. He was treated with vancomycin and Zosyn with plan for VATS. He was reportedly caught using some Illicit substance (thought to be heroin) and syringes were confiscated. He subsequently chose to leave AMA. Per chart review, he stated medical staff at [facility] were rude to him and he was not happy with his care.     On presentation to OSH ED, he was noted to be afebrile, tachycardic and hypotensive to 105/64. He had leukocytosis with WBC of 25.3,  and lactate 1.6. Rapid MRSA ID from blood culture was positive. Chest tube was placed and [place] [facility] [place].    Chest tube clamped on 9/17, minimal output of 25 cc recorded overnight. ID changed abx to ceftaroline and dapto on 9/17 for salvage therapy. Still fevering, blood cx still positive for MRSA as of 9/15, repeat cultures drawn on 9/17. No surgical intervention per CTS. Found to be Hep C+, RNA PCR in process.   Patient attempted to pull out CT on 9/17 and continues to discuss leaving AMA at this time. Patient with continued bacteremia and withdrawal given recent IVDU while admitted. IR evaluated patient for additional CT placement but patient reportedly wanted further discussion.    Per Patient:  Pt seen this morning at bedside; of note patient's father and family friend are present on interview with patient's permission. On interview, patient is lyign in bed, notable weak and drowsy at times. Aware of consult and open  "to interview.     Confirms hx of events leading to transfer from outside hospital; was at [facility] and then AMA'd before returning to [facility] (see above).     Reports current mood as "not great" due to anxiety and feeling "claustrophobic" in his hospital room. Discuss substance use--reports 4 years of IV heroin use; approximately "0.4 twice a day" (unable to clarify amount otherwise). Though patient's timeline is somewhat unclear, ultimately reports last IV use to be prior to ED presentation/hospitalization at [facility] (9/14).     Currently reports some withdrawal symptoms such as feeling anxious and "restless legs"; some possible diarrhea yesterday. Denies any nausea, vomiting, dizziness, or palpitations. Discussed interest in MAT and recovery in future for which he reports "when I'm ready" but does not elaborate specifically. Discussed current pain medications due to chest tube/procedures. Expressed some interest in MAT in future. Reports previous Suboxone use x3 weeks earlier this year when in rehab at [facility] (did not complete program;  shows Suboxone rx in 2020). Discussed previous Methadone use in past (in [place]). Does not remember dosing for either. Reports some methamphetamine use 2-3 weeks ago; otherwise denies any other substances or alcohol use.  Most recent UDS on 9/15 is positive for: amphetamines, benzos, and THC.   Denies any SI, HI, or AVH.     Discussed concerns with patient leaving hospital; emphasized concerns for patient's physical status/de-conditioning as well as extent of his current illness.     Collateral:   Rogelio, patient's father, at bedside   -Confirms pt's IVDU x 4-5 years. Reports patient distanced himself from family in past months-years after his ex-wife (patient's mother) had to remove him out of the house due to substance use/theft concerns.   -Has currently been staying with patient at hospital  -Does not report concerns for SI or HI; has concerns for outside " visitors bringing in substances so he removed patient's bag, computer, and cell phones to prevent outside communication  -Some concerns for behavior at nighttime as patient can often grab at items in room/report seeing objects though he believes it may be correlated to drowsiness/fatigue/withdrawal.       REVIEW OF SYSTEMS:    [x] Y  [] N  sleep disturbance: ** Globally: poor during hosptalization  Positive for: erratic sleep, night-time awakenings  [x] Y  [] N  appetite/weight change: ** Globally: worsening Positive for: weight loss (unintentional)  [x] Y  [] N  fatigue/anergia: ** Globally: observed Positive for: sedation  [x] Y  [] N  impairment in focus/concentration: ** Globally: fluctuating Positive for: easy distractibility, inattention     [] Y  [x] N  depression: **    [x] Y  [] N  anxiety/worry: ** Globally: fluctuating, with intermittent flares Positive for: excessive generalized anxiety, excessive generalized worry, panic attacks   [] Y  [] N  dysregulated mood/behavior: ** Globally: fluctuating Positive for: mood lability, irritability, impulsivity, risky behavior   [] Y  [x] N  manic symptomatology: **   [] Y  [x] N  psychosis: ** Globally: denies        A pertinent medical review of systems was performed with the following notable findings: See HPI Above    CURRENT PSYCHOTROPIC REGIMEN:  I[x]I Y  I[]I N  I[]I U    - Valium 10mg TID/BID  - Ativan 2mg Q4H   - Dilaudid 1mg Q4H PRN      ADDICTION:     I[]I N/A  I[x]I Y  I[]I N  I[]I U  WITHDRAWAL SYMPTOMS:   I[]I N/A  I[x]I Y  I[]I N  I[]I U  CRAVINGS:     I[x]I Y  I[]I N  I[]I U  I[]I Current  I[]I Former  Nicotine Use:   I[]I Y  I[x]I N  I[]I U  I[]I Current  I[]I Former  Alcohol Use:   I[]I Y  I[x]I N  I[]I U  I[]I Current  I[]I Former  Alcohol Misuse/Abuse:   I[x]I Y  I[]I N  I[]I U  I[]I Current  I[]I Former  Illicit Drug Use/Misuse/Abuse:   I[]I Y  I[x]I N  I[]I U  I[]I Current  I[]I Former  Misuse/Abuse of Rx  "Medications:   I[]I Cannabis  I[]I Cocaine  I[x]I Heroin  I[x]I Meth  I[]I Opioids  I[]I Stimulants  I[]I Benzos  I[]I Other:     I[]I N/A  I[]I U  Substance(s) of Choice: Heroin  I[]I N/A  I[]I U  Substance(s) Used Currently/Recently: Heroin   I[]I N/A  I[]I U  Alcohol Consumption: rare   I[x]I N/A  I[]I U  Last Drink:  I[]I N/A  I[]I U  Last Drug Use: 9/14  I[x]I N/A  I[]I U  Duration of Sobriety/Abstinence:     I[x]I Y  I[]I N  I[]I U  Hx of Detox:   I[x]I Y  I[]I N  I[]I U  Hx of Rehab: Avenues x 3 weeks earlier this year  I[x]I Y  I[]I N  I[]I U  Hx of IVDU:   I[]I Y  I[]I N  I[x]I U  Hx of Accidental Overdose:   I[]I Y  I[x]I N  I[]I U  Hx of DUI:   I[]I Y  I[x]I N  I[]I U  Hx of Complicated Withdrawal (i.e. Seizures and/or Delirium Tremens):   I[]I Y  I[]I N  I[x]I U  Hx of Known/Suspected Substance-Induced Psychiatric Disorder:   I[x]I Y  I[]I N  I[]I U  Hx of Medication Assisted Treatment:   I[]I Y  I[]I N  I[x]I U  Hx of Twelve Step Program (or Equivalent) Involvement:   I[]I Y  I[x]I N  I[]I U  Currently Exhibits Signs of Intoxication:   I[x]I Y  I[]I N  I[]I U  Currently Exhibits Signs of Withdrawal:   I[]I Y  I[x]I N  I[x]I U  Currently Active in Recovery:   I[x]I Y  I[]I N  I[]I U  Social Support:   I[]I Y  I[]I N  I[x]I U  Spouse/Partner Consumption:     I[]I N/A  I[]I Y  I[]I N  I[x]I U  Acknowledges/Accepts Addiction:   I[]I N/A  I[x]I Y  I[]I N  I[]I U  Advised to Quit/Cut Back:   I[]I N/A  I[]I Y  I[]I N  I[x]I U  Alcohol/Drug Cessation ("Wants to Quit"): Encouragement Provided, Motivational Interviewing Provided  I[]I N/A  I[]I Y  I[]I N  I[]I U  Motivation to Pursue Treatment: Low  I[]I N/A  I[]I Y  I[x]I N  I[]I U  Tobacco Cessation ("Wants to Quit"):      I[]I N/A  I[]I Y  I[]I N  I[]I U  I[x]I A  Awareness of Biomedical Complications:   I[]I N/A  I[]I Y  I[x]I N  I[]I U  I[]I A  Understands Need for Lifetime Sobriety:     View/Acceptance of Twelve Step (or Equivalent) Programs: Limited, " poor    DSM-5-TR SUBSTANCE USE DISORDER CRITERIA:     -- Impaired Control:  I[x]I Y  I[]I N  I[]I U  I[]I A  I[]I D  Often take in larger amounts or over a longer period of time than was intended:   I[x]I Y  I[]I N  I[]I U  I[]I A  I[]I D  Persistent desire or unsuccessful efforts to cut down or control use:   I[x]I Y  I[]I N  I[]I U  I[]I A  I[]I D  Great deal of time spent in activities necessary to obtain substance, use, or recover from effects:   I[x]I Y  I[]I N  I[]I U  I[]I A  I[]I D  Craving/strong desire for substance or urge to use:   -- Social Impairment:  I[x]I Y  I[]I N  I[]I U  I[]I A  I[]I D  Use resulting in failure to fulfill major role obligations at home, work or school:   I[x]I Y  I[]I N  I[]I U  I[]I A  I[]I D  Social, occupational, recreational activities decreased because of use:   I[x]I Y  I[]I N  I[]I U  I[]I A  I[]I D  Continued use despite having persistent or recurrent social or interpersonal problems caused or exacerbated by the substance:   -- Risky Use:  I[x]I Y  I[]I N  I[]I U  I[]I A  I[]I D  Recurrent use in situations in which it is physically hazardous:   I[x]I Y  I[]I N  I[]I U  I[]I A  I[]I D  Use despite physical or psychological problems that are likely to have been caused or exacerbated by the substance:   -- Neuroadaptation:  I[x]I Y  I[]I N  I[]I U  I[]I A  I[]I D  Tolerance, as defined by either of the following: (1) a need for markedly increased amounts of substance to achieve intoxication or desired effect.  -OR- (2) a markedly diminished effect with continued use of the same amount of substance:   I[x]I Y  I[]I N  I[]I U  I[]I A  I[]I D  Withdrawal, as manifested by either of the following: (1) the characteristic withdrawal syndrome for substance.  -OR- (2) substance is taken to relieve or avoid withdrawal symptoms:   -- Mild (1-3), Moderate (4-5), Severe (?6)    I[]I N/A  I[x]I Y  I[]I N  I[]I U  I[]I A  I[]I D  Active Substance Use Disorder:       HISTORY:     I[]I Y   I[x]I N  I[]I U  Psychiatric Diagnoses:   I[]I Y  I[x]I N  I[]I U  Current Psychiatric Provider (if Applicable):   I[]I Y  I[x]I N  I[]I U  Hx of Psychiatric Hospitalization:   I[]I Y  I[x]I N  I[]I U  Hx of Outpatient Psychiatric Treatment (psychiatry/psychotherapy):   I[]I Y  I[x]I N  I[]I U  Psychotropic Trials: Denies  I[]I Y  I[x]I N  I[]I U  Prior Suicide Attempts:   I[]I Y  I[x]I N  I[]I U  Hx of Suicidal Ideation:   I[]I Y  I[x]I N  I[]I U  Hx of Homicidal Ideation:   I[]I Y  I[x]I N  I[]I U  Hx of Self-Injurious Behavior (Non-Suicidal):   I[]I Y  I[x]I N  I[]I U  Hx of Violence:   I[]I Y  I[x]I N  I[]I U  Documented Hx of Malingering:     FAMILY HISTORY:  I[x]I Y  I[]I N  I[]I U    EtOH Use Disorder    I[]I Y  I[]I N  I[x]I U  Hx of Trauma/Neglect:   I[]I Y  I[]I N  I[x]I U  Hx of Physical Abuse:   I[]I Y  I[]I N  I[x]I U  Hx of Sexual Abuse:   I[x]I Y  I[]I N  I[]I U  Grew Up Locally?:   I[x]I Y  I[]I N  I[]I U  Happy Childhood?:   I[]I Y  I[x]I N  I[]I U  Significant Developmental Delay/Disability?:   I[x]I Y  I[]I N  I[]I U  GED/High School Dipoloma?:   I[]I Y  I[x]I N  I[]I U  Post High School Education?:   I[]I Y  I[x]I N  I[]I U  Currently Employed?:   I[]I Y  I[x]I N  I[]I U  On or Applying for Disability?:   I[x]I Y  I[]I N  I[]I U  Functions Independently?:   I[]I Y  I[x]I N  I[]I U  Financially Stable?:   I[]I Y  I[x]I N  I[]I U  Domiciled?:   I[x]I Y  I[]I N  I[]I U  Lives Alone?:   I[x]I Y  I[]I N  I[]I U  Heterosexual/Cisgender?:   I[]I Y  I[x]I N  I[]I U  Currently in a Romantic Relationship?:   I[]I Y  I[x]I N  I[]I U  Ever ?:   I[x]I Y  I[]I N  I[]I U  Children/Dependents?: 1 daughter  I[]I Y  I[x]I N  I[]I U  Christianity/Spiritual?:   I[x]I Y  I[]I N  I[]I U   History?: [employer]  I[]I Y  I[]I N  I[x]I U  Current Legal Issues:   I[]I Y  I[]I N  I[x]I U  Past Charges/Convictions:   I[]I Y  I[x]I N  I[]I U  Hx of Incarceration:   I[]I Y  I[x]I N  I[]I U  Engaged in  Hobbies/Recreational Activities?:   I[]I Y  I[x]I N  I[]I U  Access to a Gun?:        I[]I Y  I[x]I N  I[]I U  Hx of Seizure:   I[]I Y  I[x]I N  I[]I U  Hx of Significant Head Trauma (e.g., Loss of Consciousness, Concussion, Coma):    I[]I Y  I[x]I N  I[]I U  Medical History & Diagnoses:       The patient's past medical history has been reviewed and updated as appropriate within the electronic medical record system.    Hydropneumothorax    Sepsis    Endocarditis of tricuspid valve    IV drug user    Pneumonia of left lower lobe due to methicillin-resistant Staphylococcus aureus (MRSA)    Septic shock    MRSA bacteremia    Opioid use disorder, severe, dependence    Opioid withdrawal     Scheduled and PRN Medications: The electronic chart was reviewed and updated as appropriate.  See Medcard for details.      Allergies:  Patient has no known allergies.    PSYCHOSOCIAL FACTORS:  Stressors (Biopsychosocial, Cultural and Environmental): job/career, finances, children/dependents, parents, physical health, substance use/addiction  Functioning Relationships: good support system and poor relationship with children    STRENGTHS AND LIABILITIES:   Strength: Patient has positive support network.  Liability: Patient is impulsive.  Liability: Patient is acutely decompensated.  Liability: Patient lacks coping skills  Liability: Patient is in active addiction.    Additional Relevant History, As Applicable:       EXAMINATION:     BP (!) 109/56 (Patient Position: Lying)   Pulse (!) 113   Temp 98.7 °F (37.1 °C) (Oral)   Resp 18   Ht 6' (1.829 m)   Wt 72.1 kg (159 lb)   SpO2 97%   BMI 21.56 kg/m²     MENTAL STATUS EXAMINATION:  General Appearance: **   lying in bed, thin and gaunt, poorly groomed, disheveled  Behavior: **   minimal responses, restless and fidgety, poor eye contact  Involuntary Movements and Motor Activity: **   +weakness  Gait and Station: **   unable to ambulate, unable to assess - patient lying down or  "seated  Speech and Language: **   fluent English, slowed, soft, monotone  Mood: "Not great"  Affect: **   flat, blunted  Thought Process and Associations: **   linear  Thought Content and Perceptions: **   no suicidal or homicidal ideation, no evidence of psychosis  Sensorium: **   drowsy  Recent and Remote Memory: **   grossly intact  Attention and Concentration: **   grossly intact, easily distractible  Fund of Knowledge: **   Unable to Formally Assess  Insight: **   poor awareness of illness and situation  Judgment: **   limited, poor, has hx of poor compliance with recommendations      RISK MANAGEMENT:     I[]I Y  I[x]I N  I[]I U  I[]I A  Suicidal Ideation/Behavior: **   I[]I Y  I[x]I N  I[]I U  I[]I A  Homicidal Ideation/Behavior: **  I[]I Y  I[x]I N  I[]I U  I[]I A  Violence: **  I[]I Y  I[x]I N  I[]I U  I[]I A  Self-Injurious Behavior: **    The patient is deemed to be a vague historian.    I[x]I Y  I[]I N  I[]I U  I[]I A  I[]I N/A  Minimization of Risk Parameters Suspected/Evident: **  I[]I Y  I[x]I N  I[]I U  I[]I A  I[]I N/A  Exaggeration of Risk Parameters Suspected/Evident: **      [] Y  [x] N  Danger to Self:   [] Y  [x] N  Danger to Others:   [] Y  [x] N  Grave Disability:     In cases of emergency, daily coverage provided by Acute/ER Psych MD, NP, PA, or SW, with contact numbers located in [facility] On Call Schedule.    [facility]  Department of Psychiatry  [facility]        A diagnostic psychiatric evaluation was performed and responsiveness to treatment was assessed.  The patient demonstrates adequate ability/capacity to respond to treatment.    KEY:     I[]I Y = Yes / Present / Endorses  I[]I N = No / Absent / Denies  I[]I U = Unknown / Unable to Assess / Unwilling to Participate  I[]I A = Ambiguity Exists / Accuracy Uncertain  I[]I D = Denial or Minimization is Suspected/Evident  I[]I N/A = Non-Applicable    CHART REVIEW:     Available documentation has been " reviewed, and pertinent elements of the chart have been incorporated into this evaluation where appropriate.    LA/MS  AWARE  Site reviewed -   reports Suboxone use in 2020      ADVICE AND COUNSELING:     [x] In cases of emergencies (e.g. SI/HI resulting in danger to self or others, functioning deteriorates to the level of grave disability), call 911 or [phone], or present to the emergency department for immediate assistance.  [x] Patient should not operate a motor vehicle or heavy machinery if effects of medications or underlying symptoms/condition impair the ability to safely do so.    Alcohol, Tobacco, and Drug Counseling, as well as resources, has been provided, as warranted.     Shared medical decision making and informed consent are the hallmark and bedrock of good clinical care, and as such have been employed and obtained, respectively, to the degree possible.      Risk Mitigation Strategies, Harm Reduction Techniques, and Safety Netting are important interventions that can reduce acute and chronic risk, and as such have been employed to the degree possible.    Prescription Drug Management entails the review, recommendation, or consideration without recommendation of medications, and as such was employed during the encounter.    Additional Psychoeducation has been provided, as warranted.    Discussed, to the extent possible, diagnosis, risks and benefits of proposed treatment vs alternative treatments vs no treatment, potential side effects of these treatments and the inherent unpredictability of treatment. The patient expresses understanding of the above and displays the capacity to agree with this treatment given said understanding. Patient also agrees that, currently, the benefits outweigh the risks and consents to treatment at this time.     Written material has been provided to supplement, augment, and reinforce any discussions and interventions, via the AVS or other pre-printed handouts, as  warranted.      DIAGNOSTIC TESTING:     The chart was reviewed for recent diagnostic procedures and investigations, and pertinent results are noted below.    Na 136  9/18/2023   K 3.6  9/18/2023   Cl 104  9/18/2023   CO2 25  9/18/2023   Ca 8.0 (L)  9/18/2023  Phos 3.3  9/18/2023   Mg 1.9  9/18/2023   Anion Gap 7 (L)  9/18/2023    Glu 100  9/18/2023   HgA1c *   *    BUN 9  9/18/2023   Cr 0.8  9/18/2023   GFR >60.0  9/18/2023   Specific Gravity *   *   Protein (Urine) *   *   Microalbumin *   *      T Prot 5.5 (L)  9/18/2023   Alb 1.6 (L)  9/18/2023   T Bili 0.6  9/18/2023   Alk Phos 235 (H)  9/18/2023   AST 24  9/18/2023   ALT 29  9/18/2023   GGT *   *   CDT *   *  NH3 *   *   Amylase *   *   Lipase *   *    TSH *   *   Free T4 *   *   T4 (Total) *   *   T3 (Total) *   *   PTH *   *  Prolactin *   *   CPK 10 (L)  9/18/2023   Troponin I 0.018; 0.019;   9/15/2023   PT 13.6 (H)  9/16/2023   INR 1.3 (H)  9/16/2023     WBC 14.06 (H)  9/18/2023   RBC 3.41 (L)  9/18/2023   Hgb 8.6 (L)  9/18/2023   HCT 25.9 (L)  9/18/2023   MCV 76 (L)  9/18/2023   MCH 25.2 (L)  9/18/2023   MCHC 33.2  9/18/2023   RDW 15.4 (H)  9/18/2023     9/18/2023   MPV 11.0  9/18/2023   ANC 10.3; 73.1 (H);  (H)  9/18/2023     Cholesterol *   *   Triglycerides *   *   LDL *   *   HDL *   *     B12 *   *   Folate *   *   Thiamine *   *   Vit D *   *      HIV 1/2 Ag/Ab Non-reactive  9/17/2023   Hep B Surface *   *   Hep B Core *   *   Hep A *   *   Hep C Reactive (A)  9/17/2023   RPR Non-reactive  9/17/2023    Lithium *   *   VPA *   *   Clozapine *   *   Carbamazepine *   *   Lamotrigine *   *   Phenytoin *   *  Phenobarbital *   *    Alcohol *   *   Benzodiazepines Presumptive Positive (A)  9/15/2023   Barbiturates Negative  9/15/2023   Cannabis Presumptive Positive (A)  9/15/2023   Cocaine Negative  9/15/2023   Amphetamines Presumptive Positive (A)  9/15/2023   PCP Negative   9/15/2023   Opiates Negative  9/15/2023   Methadone *   *   Buprenorphine *   *   Fentanyl *   *   Oxycodone *   *   Tramadol *   *     Ethanol <10  9/15/2023  PETH *   *   EtG *   *   EtS *   *   Buprenorphine *   *   Norbuprenorphine *   *   Nicotine *   *   Cotinine *   *    Results for orders placed or performed during the hospital encounter of 09/15/23   EKG 12-lead    Collection Time: 09/15/23  9:24 PM    Narrative    Test Reason : R00.0,    Vent. Rate : 136 BPM     Atrial Rate : 136 BPM     P-R Int : 138 ms          QRS Dur : 084 ms      QT Int : 286 ms       P-R-T Axes : 051 072 049 degrees     QTc Int : 430 ms    Sinus tachycardia  Otherwise normal ECG  When compared with ECG of 14-SEP-2023 16:15,  Non-specific change in ST segment in Inferior leads    Referred By: [email or web address]   SELF           Confirmed By:        No results found for this or any previous visit.

## 2023-09-18 NOTE — HPI
Mr. Rosario is a 28-year-old male with medical history significant for IV drug use, pulmonary septic emboli hydropneumothorax and infective endocarditis presents as transfer from [facility] emergency department for Cardiothoracic surgery evaluation and VATS consideration.  Per chart review, patient was being treated for septic shock, infective endocarditis and respiratory failure at [facility] where he reportedly signed out AMA.  During his stay at OSH he required vasopressors and BiPAP on admission. Imaging at that time showed concern for septic emboli and left hydropneumothorax lung base likely adjacent to necrotic pneumonia. TTE revealed EF of 55-60 %, moderate tricuspid regurgitation and a moderate-size vegetation or mass on tricuspid valve. He was treated with vancomycin and Zosyn with plan for VATS. He was reportedly caught using some Illicit substance (thought to be heroin) and syringes were confiscated. He subsequently chose to leave AMA. Per chart review, he stated medical staff at [facility] were rude to him and he was not happy with his care.     On presentation to OSH ED, he was noted to be afebrile, tachycardic and hypotensive to 105/64. He had leukocytosis with WBC of 25.3,  and lactate 1.6. Rapid MRSA ID from blood culture was positive. Chest tube was placed and [place] [facility] [place].

## 2023-09-18 NOTE — RESPIRATORY THERAPY
RAPID RESPONSE RESPIRATORY CHART CHECK       Chart check completed.  Please call [phone] for further concerns or assistance.

## 2023-09-18 NOTE — HOSPITAL COURSE
Chest tube clamped on 9/17, minimal output of 25 cc recorded overnight. ID changed abx to ceftaroline and dapto on 9/17 for salvage therapy. Still fevering, blood cx still positive for MRSA as of 9/15, repeat cultures drawn on 9/17. No surgical intervention per CTS. Found to be Hep C+, RNA PCR in process.   Patient attempted to pull out CT on 9/17 and continues to discuss leaving AMA at this time. Patient with continued bacteremia and withdrawal given recent IVDU while admitted. IR evaluated patient for additional CT placement but patient reportedly wanted further discussion.     Extensive discussion with patient and family concerning patient desire to leave AMA. Offered patient titration of medication recommended for withdrawal but patient refused all options given by me as well as RN. Patient was able to verbalize the high and almost eminent risk of death associated with uncontrolled bacteremia, loculated effusion and large vegetation seen on TTE on 9/18. Patient was aware of these findings and signed AMA form. General surgery came to bedside to remove chest tube which had been clamped since 9/17. Offered PO bactrim but also stressed that this would not provide any real coverage for bacteremia. Will send referral to ID and cardiology OP as well.

## 2023-09-18 NOTE — PROGRESS NOTES
"[facility] - Telemetry Stepdown  Infectious Disease  Progress Note    Patient Name: Carlito Rosario  MRN: [id number]  Admission Date: 9/16/2023  Length of Stay: 2 days  Attending Physician: Miguel Ángel James DO  Primary Care Provider: Nereida, Primary Doctor    Isolation Status: Contact  Assessment/Plan:      Pulmonary  * Hydropneumothorax  · See as above/below    Pneumonia of left lower lobe due to methicillin-resistant Staphylococcus aureus (MRSA)  · See as above/below      Cardiac/Vascular  Endocarditis of tricuspid valve  Seen on echocardiogram performed at [facility]. No dimension on chart.  · See as above/below for plan      ID  MRSA bacteremia  Secondary to IVDU.  · See septic shock.    Septic shock  Sepsis from complicated MRSA bacteremia with TV native valve endocarditis in a young IVDU patient, off vasopressor support, OSH TTE noted "moderate" sized vegetation, evidence of septic emboli, pneumonia and moderate loculated left hydropneumothorax s/p chest tube placed     Was initially being considered for VATS at OSH, now s/p chest tube for R hydropneumothorax 9/15. General surgery following patient, advised 14Fr pigtail into that posterior/inferior pocket, no surgical plans at this time. Currently he does not seem to have any new areas of metastatic foci of infection on exam.    Course has been complicated by positive blood cultures since 9/14, subtherapeutic vancomycin levels, and high burden of bacteremia      Recommendations:  - Continue salvage therapy with Daptomycin and Ceftaroline  - Repeat blood cultures q48 hours until microbiological clearance  - TTE performed, will follow up results  - Not an OPAT candidate given IVDU hx, will need a facility transfer upon discharge  - HIV negative, Hep C Ab+ , PCR in process, if positive will need outpatient hepatology referral to initiate therapy  - Discussed ID management with patient's family members at the bedside        Anticipated Disposition: " "TBD    Thank you for your consult. I will follow-up with patient. Please contact us if you have any additional questions.    Thad Olmstead,   Infectious Disease  [facility] - Telemetry Stepdown    Subjective:     Principal Problem:Hydropneumothorax    HPI: A 28-year-old homeless man with active IVDU who originally presented to [facility] on 9/12/23 with fatigue, malaise, SOB, and nausea for 2 days duration. On evaluation in the ED at that time he was noted to be in shock requiring pressor support. Imaging showed changes concerning for septic emboli and pneumonia. He was admitted and started on empiric cefepime plus vancomycin while awaiting culture results. 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 He was evaluated by CTS with plans for surgical intervention however on 9/14 he was found with drugs/drug paraphernalia after which the patient left against medical advice. On that same day, he presented to [facility]. Cultures were collected and orders for inpatient admission placed. Unfortunately, the patient once again left against medical advice.       His symptoms progressed and, per chart review, his roommate called 911 at which time he was taken back to [facility] (9/15/23). He was started on pressor support and had a chest tube placed on left lung due to a moderate size pneumothorax. Due to his known valvular vegetation, he was transferred to [facility] for CTS evaluation.     Infectious Diseases consulted for "endocarditis with septic emboli. transferred for CTS eval"          Interval History: Feeling anxious and asking about discharge. Discussed treatment plan with patient and 2 family members at bedside. Ongoing fevers (102.2F) with fluctuating MAPs overnight that responded to fluid resuscitation. Remains on room air. Labs with stable leukocytosis 14. BCx 9/17 " growing GPCs (staph) pending spp/sen. Repeat BCx ordered. Hep C PCR in process. Remains on salvage therapy with Dapto and Teflaro. IR consulted for chest tube placement into loculated collection with tentative plan for today 9/18.    Review of Systems   Constitutional:  Positive for chills, fatigue and fever. Negative for appetite change.   Respiratory:  Negative for cough and shortness of breath.    Cardiovascular:  Negative for chest pain and leg swelling.   Gastrointestinal:  Negative for abdominal pain, diarrhea, nausea and vomiting.   Genitourinary:  Negative for dysuria, flank pain and hematuria.   Musculoskeletal:  Negative for arthralgias and myalgias.   Neurological:  Positive for weakness. Negative for dizziness.   Psychiatric/Behavioral:  Negative for confusion.      Objective:     Vital Signs (Most Recent):  Temp: 99.1 °F (37.3 °C) (09/18/23 0809)  Pulse: 105 (09/18/23 1043)  Resp: 16 (09/18/23 0809)  BP: 113/61 (09/18/23 1043)  SpO2: 97 % (09/18/23 0809) Vital Signs (24h Range):  Temp:  [98.3 °F (36.8 °C)-102.2 °F (39 °C)] 99.1 °F (37.3 °C)  Pulse:  [] 105  Resp:  [7-35] 16  SpO2:  [92 %-100 %] 97 %  BP: ()/(51-65) 113/61     Weight: 72.1 kg (159 lb)  Body mass index is 21.56 kg/m².    Estimated Creatinine Clearance: 140.2 mL/min (based on SCr of 0.8 mg/dL).     Physical Exam  Vitals and nursing note reviewed.   Constitutional:       General: He is awake. He is not in acute distress.     Appearance: Normal appearance. He is normal weight. He is ill-appearing and toxic-appearing. He is not diaphoretic.      Comments: Appears fatigued but alerts to verbal stimulation   HENT:      Head: Normocephalic and atraumatic.      Nose: Nose normal.      Mouth/Throat:      Mouth: Mucous membranes are moist.   Eyes:      General: No scleral icterus.        Right eye: No discharge.         Left eye: No discharge.      Extraocular Movements: Extraocular movements intact.      Pupils: Pupils are equal,  round, and reactive to light.   Cardiovascular:      Rate and Rhythm: Regular rhythm. Tachycardia present.      Chest Wall: Thrill present.      Pulses: Normal pulses.      Heart sounds: Murmur heard.   Pulmonary:      Effort: Pulmonary effort is normal. Tachypnea present. No respiratory distress.      Breath sounds: Rales present. No wheezing.   Chest:      Chest wall: No tenderness.   Abdominal:      General: Bowel sounds are normal. There is no distension.      Palpations: Abdomen is soft.      Tenderness: There is no abdominal tenderness.   Musculoskeletal:         General: No swelling or tenderness.      Cervical back: Normal range of motion.      Right lower leg: No edema.      Left lower leg: No edema.   Skin:     General: Skin is warm and dry.      Capillary Refill: Capillary refill takes less than 2 seconds.      Findings: No erythema or rash.   Neurological:      General: No focal deficit present.      Mental Status: He is alert and oriented to person, place, and time.   Psychiatric:         Mood and Affect: Mood is anxious.         Speech: Speech is delayed.         Behavior: Behavior is cooperative.        Significant Labs: All pertinent labs within the past 24 hours have been reviewed.    Significant Imaging: I have reviewed all pertinent imaging results/findings within the past 24 hours.

## 2023-09-18 NOTE — ASSESSMENT & PLAN NOTE
Known Heroin use. Received lorazepam in the ED. Also recent IVDU while admitted as visitor brought in heroin. Active withdrawal. Poor insight into disease and morbidity.     - opioid withdrawal order set  - psychiatry consult placed for evaluation and management of withdrawal  - will continue benzo use for management of agitation/anxiety  - Monitor for signs of withdrawal

## 2023-09-18 NOTE — NURSING
Nurses Note -- 4 Eyes      9/17/2023   7:26 PM      Skin assessed during: Q Shift Change      [x] No Altered Skin Integrity Present    []Prevention Measures Documented      [] Yes- Altered Skin Integrity Present or Discovered   [] LDA Added if Not in Epic (Describe Wound)   [] New Altered Skin Integrity was Present on Admit and Documented in LDA   [] Wound Image Taken    Wound Care Consulted? No    Attending Nurse:  Adele Zuñiga RN/Staff Member:  Melly MCCALL

## 2023-09-18 NOTE — SUBJECTIVE & OBJECTIVE
Interval History: NAEON. Attempted to pull out CT overnight. Continued anxious behavior. CIWA 11 with PRN ativan. Deferred IR catheter placement on 9/17. Patient to discuss further with father. Febrile and bacteremia continues. Continues to discuss leaving AMA this AM.     Review of Systems   Constitutional:  Positive for chills, fatigue and fever. Negative for appetite change.   Respiratory:  Negative for cough and shortness of breath.    Cardiovascular:  Negative for chest pain and leg swelling.   Gastrointestinal:  Negative for abdominal pain, diarrhea, nausea and vomiting.   Genitourinary:  Negative for dysuria, flank pain and hematuria.   Musculoskeletal:  Negative for arthralgias and myalgias.   Neurological:  Positive for weakness. Negative for dizziness.   Psychiatric/Behavioral:  Positive for behavioral problems and sleep disturbance. Negative for confusion. The patient is nervous/anxious.      Objective:     Vital Signs (Most Recent):  Temp: 98.7 °F (37.1 °C) (09/18/23 1213)  Pulse: (!) 113 (09/18/23 1213)  Resp: 18 (09/18/23 1213)  BP: (!) 109/56 (09/18/23 1213)  SpO2: 97 % (09/18/23 0809) Vital Signs (24h Range):  Temp:  [98.3 °F (36.8 °C)-102.2 °F (39 °C)] 98.7 °F (37.1 °C)  Pulse:  [] 113  Resp:  [16-34] 18  SpO2:  [92 %-100 %] 97 %  BP: ()/(51-65) 109/56     Weight: 72.1 kg (159 lb)  Body mass index is 21.56 kg/m².    Intake/Output Summary (Last 24 hours) at 9/18/2023 1224  Last data filed at 9/18/2023 0523  Gross per 24 hour   Intake 200 ml   Output 4300 ml   Net -4100 ml         Physical Exam  Vitals and nursing note reviewed.   Constitutional:       Appearance: He is ill-appearing.      Comments: somnolence   HENT:      Head: Normocephalic.      Mouth/Throat:      Dentition: Abnormal dentition.      Pharynx: No oropharyngeal exudate.   Eyes:      General: No scleral icterus.     Pupils: Pupils are equal, round, and reactive to light.   Cardiovascular:      Rate and Rhythm: Tachycardia  present.      Heart sounds: Normal heart sounds.   Pulmonary:      Breath sounds: Normal breath sounds. No wheezing or rales.      Comments: Tachypnic periodically   Abdominal:      General: Abdomen is flat.      Palpations: Abdomen is soft.   Musculoskeletal:         General: Normal range of motion.      Cervical back: Normal range of motion. No tenderness.   Lymphadenopathy:      Cervical: No cervical adenopathy.   Skin:     General: Skin is warm and dry.      Coloration: Skin is not jaundiced.      Findings: No bruising.             Significant Labs: All pertinent labs within the past 24 hours have been reviewed.    Significant Imaging: I have reviewed all pertinent imaging results/findings within the past 24 hours.

## 2023-09-18 NOTE — ASSESSMENT & PLAN NOTE
Seen on echocardiogram performed at [facility]. No dimension on chart.  · See as above/below for plan

## 2023-09-18 NOTE — PLAN OF CARE
ICU transfer yesterday afternoon.  Endocarditis, Chest Tube, Opioid withdrawal  Very anxious and tried to pill out CT,  Giving ativan per CIWA and liberal use of dilaudid to help with withdrawals and anxiety, Dilaudid seems to work better  Phsychiatry consulted for assistance.    For possible drainage of lung per IR today.  Only family is allowed to visit.  Left AMA on prior admit.

## 2023-09-18 NOTE — ASSESSMENT & PLAN NOTE
Patient presents as transfer from OSH with chest tube for evaluation by CTS for possible VATS. CT on 9/16: loculated hydropneumothorax. Continued bacteremia as well. Minimal drainage being followed by CTS and ID.     Plan  - Continue IV abx per ID recommendations

## 2023-09-18 NOTE — ASSESSMENT & PLAN NOTE
Per OSH imaging, found to have mall left hydropneumothorax at the lung base likely due to adjacent necrotic pneumonia. Chest tube placed at OSH ED. Draining bloody fluid.     - CTS: no surgical intervention  - Chest tube care, clamped on 9/17. CXR unchanged on 9/18. CTS following with appreciated recommendations   - IR consulted for pigtail. Patient deferred consent for procedure. Discussed with patient at bedside in great detail about risk. Will be reevaluating and coordinating with IR concerning procedure

## 2023-09-18 NOTE — CARE UPDATE
"RAPID RESPONSE NURSE PROACTIVE ROUNDING NOTE       Time of Visit: 2240    Admit Date: 2023  LOS: 1  Code Status: Full Code   Date of Visit: 2023  : 1995  Age: 28 y.o.  Sex: male  Race: White  Bed: 9383/8099 A:   MRN: [id number]  Was the patient discharged from an ICU this admission? Yes   Was the patient discharged from a PACU within last 24 hours? No   Did the patient receive conscious sedation/general anesthesia in last 24 hours? No  Was the patient in the ED within the past 24 hours? No  Was the patient on NIPPV within the past 24 hours? No   Attending Physician: Tuan Adams  Primary Service:    Time spent at the bedside: < 15 min    SITUATION      Reason for alert: MEWS 7  Called to evaluate the patient for  fever, tachycardia, hypotension    BACKGROUND     Why is the patient in the hospital?: Hydropneumothorax    Patient has a past medical history of IVDU (intravenous drug user).    Last Vitals:  Temp: 98.3 °F (36.8 °C) (2242)  Pulse: 109 (2242)  Resp: 18 (2242)  BP: 104/57 (2242)  SpO2: 100 % (2006)    24 Hours Vitals Range:  Temp:  [98.3 °F (36.8 °C)-102.7 °F (39.3 °C)]   Pulse:  [102-124]   Resp:  [7-51]   BP: ()/(50-65)   SpO2:  [92 %-100 %]     Labs:  Recent Labs     23  0523  03023  2100   WBC 15.72* 12.52 14.35*   HGB 7.8* 8.2* 8.7*   HCT 23.6* 25.3* 26.3*    230 297       Recent Labs     23  0523  03023  2100   * 132* 134*   K 4.1 3.6 3.5    100 103   CO2 21* 25 19*   BUN 21* 12 10   CREATININE 0.8 0.8 0.8   GLU 79 123* 102   PHOS  --  2.8  --    MG  --  1.7 1.7        No results for input(s): "PH", "PCO2", "PO2", "HCO3", "POCSATURATED", "BE" in the last 72 hours.     ASSESSMENT    Physical Exam  Vitals and nursing note reviewed.   Cardiovascular:      Rate and Rhythm: Regular rhythm. Tachycardia present.   Pulmonary:      Effort: Pulmonary effort is normal. No respiratory distress.     "  Breath sounds: Normal breath sounds.   Neurological:      Mental Status: He is alert and oriented to person, place, and time.       MEWS 7 related to hypotension, fever, and tachycardia. 1L lactated ringers given along with PRN Tylenol. VS on assessment: , RR 18, 104/57 (77), Temp 98.3F.     INTERVENTIONS    The patient was seen for Cardiac problem. Staff concerns included tachycardia and hypotension. The following interventions were performed: continuous cardiac monitoring continued, continuous pulse ox monitoring continued, and lactated ringers 1000 ml IV bolus .  Medical problem. Staff concerns included fever. The following interventions were performed: PRN Tylenol.    RECOMMENDATIONS    - Continue to monitor for orders    PROVIDER ESCALATION    Yes/No  No    Orders received and case discussed with NA.    Disposition: Remain in room 8099.    FOLLOW-UP    Bedside Adele MCCALL  updated on plan of care. Instructed to call the Rapid Response Nurse, Rebecca Mckeon RN at [phone] for additional questions or concerns.

## 2023-09-18 NOTE — PLAN OF CARE
Problem: Adult Inpatient Plan of Care  Goal: Plan of Care Review  Outcome: Ongoing, Progressing  Goal: Patient-Specific Goal (Individualized)  Outcome: Ongoing, Progressing  Goal: Optimal Comfort and Wellbeing  Outcome: Ongoing, Progressing  Goal: Readiness for Transition of Care  Outcome: Ongoing, Progressing     Problem: Adjustment to Illness (Sepsis/Septic Shock)  Goal: Optimal Coping  Outcome: Ongoing, Progressing     Problem: Bleeding (Sepsis/Septic Shock)  Goal: Absence of Bleeding  Outcome: Ongoing, Progressing     Problem: Glycemic Control Impaired (Sepsis/Septic Shock)  Goal: Blood Glucose Level Within Desired Range  Outcome: Ongoing, Progressing     Problem: Infection Progression (Sepsis/Septic Shock)  Goal: Absence of Infection Signs and Symptoms  Outcome: Ongoing, Progressing     Problem: Nutrition Impaired (Sepsis/Septic Shock)  Goal: Optimal Nutrition Intake  Outcome: Ongoing, Progressing     Problem: Fluid Imbalance (Pneumonia)  Goal: Fluid Balance  Outcome: Ongoing, Progressing     Problem: Infection (Pneumonia)  Goal: Resolution of Infection Signs and Symptoms  Outcome: Ongoing, Progressing     Problem: Respiratory Compromise (Pneumonia)  Goal: Effective Oxygenation and Ventilation  Outcome: Ongoing, Progressing     Problem: Infection  Goal: Absence of Infection Signs and Symptoms  Outcome: Ongoing, Progressing     Problem: Skin Injury Risk Increased  Goal: Skin Health and Integrity  Outcome: Ongoing, Progressing   Pt had hypotensive episode during shift, MD ordered bolus and maps stayed above 65 after administration. Pt 102.2 fever treated with tylenol. Chest tube clamped per MD orders.

## 2023-09-19 LAB
MRSA ID BY PCR: POSITIVE
STAPH AUREUS ID BY PCR: POSITIVE

## 2023-09-19 NOTE — DISCHARGE SUMMARY
[facility]in De La Cruz - Telemetry [facility] Medicine  Discharge Summary      Patient Name: Carlito Rosario  MRN: [id number]  KRISTEN: [id number]  Patient Class: IP- Inpatient  Admission Date: 9/16/2023  Hospital Length of Stay: 2 days  Discharge Date and Time:  09/18/2023 7:33 PM  Attending Physician: Nereida att. providers found   Discharging Provider: Noy James DO  Primary Care Provider: Nereida Primary Doctor  [facility] Medicine Team: [facility] HOSP MED W Noy James DO  Primary Care Team: [facility] HOSP MED     HPI:   Mr. Rosario is a 28-year-old male with medical history significant for IV drug use, pulmonary septic emboli hydropneumothorax and infective endocarditis presents as transfer from [facility] emergency department for Cardiothoracic surgery evaluation and VATS consideration.  Per chart review, patient was being treated for septic shock, infective endocarditis and respiratory failure at [facility] where he reportedly signed out AMA.  During his stay at OSH he required vasopressors and BiPAP on admission. Imaging at that time showed concern for septic emboli and left hydropneumothorax lung base likely adjacent to necrotic pneumonia. TTE revealed EF of 55-60 %, moderate tricuspid regurgitation and a moderate-size vegetation or mass on tricuspid valve. He was treated with vancomycin and Zosyn with plan for VATS. He was reportedly caught using some Illicit substance (thought to be heroin) and syringes were confiscated. He subsequently chose to leave [facility]. Per chart review, he stated medical staff at [facility] were rude to him and he was not happy with his care.     On presentation to OSH ED, he was noted to be afebrile, tachycardic and hypotensive to 105/64. He had leukocytosis with WBC of 25.3,  and lactate 1.6. Rapid MRSA ID from blood culture was positive. Chest tube was placed and he was transferred to [facility] for CTS evaluation.      * No surgery found *      Hospital Course:   Chest tube clamped on  9/17, minimal output of 25 cc recorded overnight. ID changed abx to ceftaroline and dapto on 9/17 for salvage therapy. Still fevering, blood cx still positive for MRSA as of 9/15, repeat cultures drawn on 9/17. No surgical intervention per CTS. Found to be Hep C+, RNA PCR in process.   Patient attempted to pull out CT on 9/17 and continues to discuss leaving AMA at this time. Patient with continued bacteremia and withdrawal given recent IVDU while admitted. IR evaluated patient for additional CT placement but patient reportedly wanted further discussion.     Extensive discussion with patient and family concerning patient desire to leave AMA. Offered patient titration of medication recommended for withdrawal but patient refused all options given by me as well as RN. Patient was able to verbalize the high and almost eminent risk of death associated with uncontrolled bacteremia, loculated effusion and large vegetation seen on TTE on 9/18. Patient was aware of these findings and signed AMA form. General surgery came to bedside to remove chest tube which had been clamped since 9/17. Offered PO bactrim but also stressed that this would not provide any real coverage for bacteremia. Will send referral to ID and cardiology OP as well.        Goals of Care Treatment Preferences:  Code Status: Full Code      Consults:   Consults (From admission, onward)        Status Ordering Provider     Inpatient consult to Psychiatry  Once        Provider:  (Not yet assigned)    Completed VANE GONZALEZ     Inpatient consult to Interventional Radiology  Once        Provider:  (Not yet assigned)    Completed RHIANNON ORTEGA     Inpatient consult to Infectious Diseases  Once        Provider:  (Not yet assigned)    Completed CHRISTIAN CISNEROS     Inpatient consult to Cardiothoracic Surgery  Once        Provider:  (Not yet assigned)    Completed PHILIP MONTANA          Psychiatric  Opioid withdrawal  See IVDU      Opioid use disorder, severe,  dependence  See IVDU      IV drug user  Known Heroin use. Received lorazepam in the ED. Also recent IVDU while admitted as visitor brought in heroin. Active withdrawal. Poor insight into disease and morbidity.     - opioid withdrawal order set  - psychiatry consult placed for evaluation and management of withdrawal  - will continue benzo use for management of agitation/anxiety  - Monitor for signs of withdrawal     Pulmonary  * Hydropneumothorax  Per OSH imaging, found to have mall left hydropneumothorax at the lung base likely due to adjacent necrotic pneumonia. Chest tube placed at OSH ED. Draining bloody fluid.     - CTS: no surgical intervention  - Chest tube care, clamped on 9/17. CXR unchanged on 9/18. CTS following with appreciated recommendations   - IR consulted for pigtail. Patient deferred consent for procedure. Discussed with patient at bedside in great detail about risk. Will be reevaluating and coordinating with IR concerning procedure      Pneumonia of left lower lobe due to methicillin-resistant Staphylococcus aureus (MRSA)  Patient presents as transfer from OSH with chest tube for evaluation by CTS for possible VATS. CT on 9/16: loculated hydropneumothorax. Continued bacteremia as well. Minimal drainage being followed by CTS and ID.     Plan  - Continue IV abx per ID recommendations        Cardiac/Vascular  Endocarditis of tricuspid valve  Patient with noted tricuspid vegetation on TTE at OSH and septic emboli in the setting of IVDU.     - Continue dapto/ceftaroline per ID for salvage therapy  - f/u persistently +blood cx  - no surgery per CTS at this time  - f/u echo completed today        ID  MRSA bacteremia  Follow cultures  Positive since 9/14. Will follow up repeat cultures on 9/18. CTS following as well      Septic shock  Resolved    See sepsis      Sepsis  This patient does have evidence of infective focus  My overall impression is sepsis.  Source: Blood  Antibiotics given-     Antibiotics  (72h ago, onward)    Start     Stop Route Frequency Ordered    09/17/23 1330  DAPTOmycin (CUBICIN) 580 mg in sodium chloride 0.9% SolP 50 mL IVPB         -- IV Every 24 hours (non-standard times) 09/17/23 1225    09/17/23 1330  ceftaroline fosamiL (TEFLARO) 600 mg in dextrose 5 % in water (D5W) 50 mL IVPB (MB+)         -- IV Every 8 hours (non-standard times) 09/17/23 1225        Latest lactate reviewed-  Recent Labs   Lab 09/15/23  2041 09/17/23  2100   LACTATE 0.6 1.2     Organ dysfunction indicated by Acute respiratory failure    Fluid challenge Actual Body weight- Patient will receive 30ml/kg actual body weight to calculate fluid bolus for treatment of septic shock.     Post- resuscitation assessment Yes Perfusion exam was performed within 6 hours of septic shock presentation after bolus shows Adequate tissue perfusion assessed by non-invasive monitoring       Will Not start Pressors- Levophed for MAP of 65  Source control achieved by:  Antibiotics    Noted positive MRSA rapid ID at OSH. Likely bacteremia in the setting of known IVDU.     - CTS recommended IR consult for pigtail catheter placement but patient deferring decision at this time. Will continue discussion  - stopped vanc on 9/17. Started dapto and ceftaroline for salvage therapy on 9/17  - F/U blood cultures. Persistent bacteremia      Final Active Diagnoses:    Diagnosis Date Noted POA    PRINCIPAL PROBLEM:  Hydropneumothorax [J94.8] 09/16/2023 Yes    Opioid use disorder, severe, dependence [F11.20] 09/18/2023 Yes     Chronic    Opioid withdrawal [F11.93] 09/18/2023 Yes    Septic shock [A41.9, R65.21] 09/16/2023 Yes    MRSA bacteremia [R78.81, B95.62] 09/16/2023 Yes    Endocarditis of tricuspid valve [I07.9] 09/14/2023 Yes    Sepsis [A41.9] 09/14/2023 Yes    Pneumonia of left lower lobe due to methicillin-resistant Staphylococcus aureus (MRSA) [J15.212] 09/14/2023 Yes    IV drug user [F19.90] 09/14/2023 Yes      Problems Resolved During this  Admission:       Discharged Condition: against medical advice    Disposition: Left Against Medical Adv*    Follow Up:    Patient Instructions:   No discharge procedures on file.    Significant Diagnostic Studies: Labs:   CMP   Recent Labs   Lab 09/17/23 0304 09/17/23 2100 09/18/23  0244   * 134* 136   K 3.6 3.5 3.6    103 104   CO2 25 19* 25   * 102 100   BUN 12 10 9   CREATININE 0.8 0.8 0.8   CALCIUM 7.9* 7.9* 8.0*   PROT 5.0*  --  5.5*   ALBUMIN 1.6*  --  1.6*   BILITOT 0.9  --  0.6   ALKPHOS 276*  --  235*   AST 38  --  24   ALT 40  --  29   ANIONGAP 7* 12 7*    and CBC   Recent Labs   Lab 09/17/23 0304 09/17/23 2100 09/18/23  0244   WBC 12.52 14.35* 14.06*   HGB 8.2* 8.7* 8.6*   HCT 25.3* 26.3* 25.9*    297 338     Microbiology:   Blood Culture   Lab Results   Component Value Date    LABBLOO No Growth to date 09/18/2023     Cardiac Graphics: Echocardiogram:   Transthoracic echo (TTE) complete (Cupid Only):   Results for orders placed or performed during the hospital encounter of 09/16/23   Echo   Result Value Ref Range    BSA 1.91 m2    LVOT stroke volume 67.08 cm3    LVIDd 5.09 3.5 - 6.0 cm    LV Systolic Volume 48.04 mL    LV Systolic Volume Index 24.9 mL/m2    LVIDs 3.42 2.1 - 4.0 cm    LV Diastolic Volume 123.13 mL    LV Diastolic Volume Index 63.80 mL/m2    IVS 0.65 0.6 - 1.1 cm    LVOT diameter 2.25 cm    LVOT area 4.0 cm2    FS 33 28 - 44 %    Left Ventricle Relative Wall Thickness 0.23 cm    Posterior Wall 0.58 (A) 0.6 - 1.1 cm    LV mass 100.91 g    LV Mass Index 52 g/m2    MV Peak E Venancio 0.71 m/s    TDI LATERAL 0.11 m/s    TDI SEPTAL 0.12 m/s    E/E' ratio 6.17 m/s    MV Peak A Venancio 0.65 m/s    TR Max Venancio 2.97 m/s    E/A ratio 1.09     IVRT 70.41 msec    E wave deceleration time 103.53 msec    LV SEPTAL E/E' RATIO 5.92 m/s    LA Volume Index 27.0 mL/m2    LV LATERAL E/E' RATIO 6.45 m/s    LA volume 52.09 cm3    LVOT peak venancio 1.24 m/s    LA size 3.17 cm    Left Atrium Minor  Axis 5.09 cm    Left Atrium Major Axis 5.25 cm    LA volume (mod) 52.46 cm3    LA WIDTH 3.74 cm    LA Volume Index (Mod) 27.2 mL/m2    RVDD 2.88 cm    TAPSE 3.09 cm    RA Major Axis 4.24 cm    RA Width 3.98 cm    AV mean gradient 4 mmHg    AV peak gradient 7 mmHg    Ao peak alana 1.29 m/s    Ao VTI 16.97 cm    LVOT peak VTI 16.88 cm    AV valve area 3.95 cm²    AV Velocity Ratio 0.96     AV index (prosthetic) 0.99     KRISTIE by Velocity Ratio 3.82 cm²    MV stenosis pressure 1/2 time 30.02 ms    MV valve area p 1/2 method 7.33 cm2    Triscuspid Valve Regurgitation Peak Gradient 35 mmHg    Sinus 3.08 cm    STJ 2.58 cm    Ascending aorta 2.67 cm    Mean e' 0.12 m/s    ZLVIDS 0.13     ZLVIDD -0.71     TV resting pulmonary artery pressure 38 mmHg    RV TB RVSP 6 mmHg    Est. RA pres 3 mmHg    EF 60 %    Narrative      Left Ventricle: The left ventricle is normal in size. Normal wall   thickness. Normal wall motion. There is normal systolic function. Ejection   fraction by visual approximation is 60%. There is normal diastolic   function.    Right Ventricle: Normal right ventricular cavity size. Wall thickness   is normal. Right ventricle wall motion  is normal. Systolic function is   normal.    Tricuspid Valve: There is a large irregular mass present on the septal   leaflet. This appears to be a vegetation. There is moderate to severe   regurgitation.    Pulmonary Artery: The estimated pulmonary artery systolic pressure is   38 mmHg.    IVC/SVC: Normal venous pressure at 3 mmHg.         Pending Diagnostic Studies:     Procedure Component Value Units Date/Time    IR Pleural Drainage Cath [facility] w/Imaging-Perc [[id number]]     Order Status: Sent Lab Status: No result          Medications:  None    Indwelling Lines/Drains at time of discharge:   Lines/Drains/Airways     None                 Time spent on the discharge of patient: 60 minutes         Noy James DO  Department of Hospital Medicine  [facility] De La [facility] - Telemetry  Stepdown

## 2023-09-19 NOTE — ASSESSMENT & PLAN NOTE
This patient does have evidence of infective focus  My overall impression is sepsis.  Source: Blood  Antibiotics given-     Antibiotics (72h ago, onward)    Start     Stop Route Frequency Ordered    09/17/23 1330  DAPTOmycin (CUBICIN) 580 mg in sodium chloride 0.9% SolP 50 mL IVPB         -- IV Every 24 hours (non-standard times) 09/17/23 1225    09/17/23 1330  ceftaroline fosamiL (TEFLARO) 600 mg in dextrose 5 % in water (D5W) 50 mL IVPB (MB+)         -- IV Every 8 hours (non-standard times) 09/17/23 1225        Latest lactate reviewed-  Recent Labs   Lab 09/15/23  2041 09/17/23  2100   LACTATE 0.6 1.2     Organ dysfunction indicated by Acute respiratory failure    Fluid challenge Actual Body weight- Patient will receive 30ml/kg actual body weight to calculate fluid bolus for treatment of septic shock.     Post- resuscitation assessment Yes Perfusion exam was performed within 6 hours of septic shock presentation after bolus shows Adequate tissue perfusion assessed by non-invasive monitoring       Will Not start Pressors- Levophed for MAP of 65  Source control achieved by:  Antibiotics    Noted positive MRSA rapid ID at OSH. Likely bacteremia in the setting of known IVDU.     - CTS recommended IR consult for pigtail catheter placement but patient deferring decision at this time. Will continue discussion  - stopped vanc on 9/17. Started dapto and ceftaroline for salvage therapy on 9/17  - F/U blood cultures. Persistent bacteremia

## 2023-09-20 LAB
BACTERIA BLD CULT: ABNORMAL

## 2023-09-21 LAB
BACTERIA BLD CULT: ABNORMAL

## 2023-10-07 ENCOUNTER — HOSPITAL ENCOUNTER (EMERGENCY)
Facility: OTHER | Age: 28
Discharge: HOME | End: 2023-10-07
Attending: EMERGENCY MEDICINE
Payer: MEDICAID

## 2023-10-07 VITALS
OXYGEN SATURATION: 100 % | DIASTOLIC BLOOD PRESSURE: 58 MMHG | RESPIRATION RATE: 18 BRPM | TEMPERATURE: 98 F | WEIGHT: 145 LBS | HEART RATE: 99 BPM | HEIGHT: 72 IN | SYSTOLIC BLOOD PRESSURE: 111 MMHG | BODY MASS INDEX: 19.64 KG/M2

## 2023-10-07 DIAGNOSIS — I38 ENDOCARDITIS, UNSPECIFIED CHRONICITY, UNSPECIFIED ENDOCARDITIS TYPE: Primary | ICD-10-CM

## 2023-10-07 DIAGNOSIS — F19.91 HISTORY OF DRUG USE: ICD-10-CM

## 2023-10-07 DIAGNOSIS — Z86.79 HISTORY OF SUBACUTE BACTERIAL ENDOCARDITIS: ICD-10-CM

## 2023-10-07 LAB
ALBUMIN SERPL BCP-MCNC: 3.1 G/DL (ref 3.5–5.2)
ALP SERPL-CCNC: 118 U/L (ref 55–135)
ALT SERPL W/O P-5'-P-CCNC: 21 U/L (ref 10–44)
ANION GAP SERPL CALC-SCNC: 12 MMOL/L (ref 8–16)
AST SERPL-CCNC: 13 U/L (ref 10–40)
BASOPHILS # BLD AUTO: 0.07 K/UL (ref 0–0.2)
BASOPHILS NFR BLD: 0.8 % (ref 0–1.9)
BILIRUB SERPL-MCNC: 0.2 MG/DL (ref 0.1–1)
BUN SERPL-MCNC: 16 MG/DL (ref 6–20)
CALCIUM SERPL-MCNC: 9.1 MG/DL (ref 8.7–10.5)
CHLORIDE SERPL-SCNC: 114 MMOL/L (ref 95–110)
CO2 SERPL-SCNC: 21 MMOL/L (ref 23–29)
CREAT SERPL-MCNC: 0.9 MG/DL (ref 0.5–1.4)
DIFFERENTIAL METHOD: ABNORMAL
EOSINOPHIL # BLD AUTO: 0.5 K/UL (ref 0–0.5)
EOSINOPHIL NFR BLD: 5.5 % (ref 0–8)
ERYTHROCYTE [DISTWIDTH] IN BLOOD BY AUTOMATED COUNT: 17.2 % (ref 11.5–14.5)
EST. GFR  (NO RACE VARIABLE): >60 ML/MIN/1.73 M^2
GLUCOSE SERPL-MCNC: 78 MG/DL (ref 70–110)
HCT VFR BLD AUTO: 35.7 % (ref 40–54)
HGB BLD-MCNC: 10.9 G/DL (ref 14–18)
IMM GRANULOCYTES # BLD AUTO: 0.02 K/UL (ref 0–0.04)
IMM GRANULOCYTES NFR BLD AUTO: 0.2 % (ref 0–0.5)
LDH SERPL L TO P-CCNC: 1.73 MMOL/L (ref 0.5–2.2)
LYMPHOCYTES # BLD AUTO: 2.2 K/UL (ref 1–4.8)
LYMPHOCYTES NFR BLD: 24.5 % (ref 18–48)
MCH RBC QN AUTO: 26.3 PG (ref 27–31)
MCHC RBC AUTO-ENTMCNC: 30.5 G/DL (ref 32–36)
MCV RBC AUTO: 86 FL (ref 82–98)
MONOCYTES # BLD AUTO: 0.5 K/UL (ref 0.3–1)
MONOCYTES NFR BLD: 5.8 % (ref 4–15)
NEUTROPHILS # BLD AUTO: 5.6 K/UL (ref 1.8–7.7)
NEUTROPHILS NFR BLD: 63.2 % (ref 38–73)
NRBC BLD-RTO: 0 /100 WBC
PLATELET # BLD AUTO: 338 K/UL (ref 150–450)
PMV BLD AUTO: 9.8 FL (ref 9.2–12.9)
POTASSIUM SERPL-SCNC: 4.6 MMOL/L (ref 3.5–5.1)
PROT SERPL-MCNC: 7.7 G/DL (ref 6–8.4)
RBC # BLD AUTO: 4.15 M/UL (ref 4.6–6.2)
SAMPLE: NORMAL
SODIUM SERPL-SCNC: 147 MMOL/L (ref 136–145)
WBC # BLD AUTO: 8.9 K/UL (ref 3.9–12.7)

## 2023-10-07 PROCEDURE — 63600175 PHARM REV CODE 636 W HCPCS: Mod: JZ,JG | Performed by: EMERGENCY MEDICINE

## 2023-10-07 PROCEDURE — 99284 EMERGENCY DEPT VISIT MOD MDM: CPT | Mod: 25

## 2023-10-07 PROCEDURE — 82803 BLOOD GASES ANY COMBINATION: CPT

## 2023-10-07 PROCEDURE — 85025 COMPLETE CBC W/AUTO DIFF WBC: CPT | Performed by: EMERGENCY MEDICINE

## 2023-10-07 PROCEDURE — 80053 COMPREHEN METABOLIC PANEL: CPT | Performed by: EMERGENCY MEDICINE

## 2023-10-07 PROCEDURE — 96365 THER/PROPH/DIAG IV INF INIT: CPT

## 2023-10-07 PROCEDURE — 83605 ASSAY OF LACTIC ACID: CPT

## 2023-10-07 PROCEDURE — 25000003 PHARM REV CODE 250: Performed by: EMERGENCY MEDICINE

## 2023-10-07 PROCEDURE — 87040 BLOOD CULTURE FOR BACTERIA: CPT | Mod: 59 | Performed by: EMERGENCY MEDICINE

## 2023-10-07 PROCEDURE — 99900035 HC TECH TIME PER 15 MIN (STAT)

## 2023-10-07 PROCEDURE — 96361 HYDRATE IV INFUSION ADD-ON: CPT

## 2023-10-07 RX ADMIN — SODIUM CHLORIDE 1000 ML: 0.9 INJECTION, SOLUTION INTRAVENOUS at 12:10

## 2023-10-07 RX ADMIN — DALBAVANCIN 1500 MG: 500 INJECTION, POWDER, FOR SOLUTION INTRAVENOUS at 12:10

## 2023-10-07 NOTE — DISCHARGE INSTRUCTIONS
Thank you for letting us take care of you today! It was nice meeting you, and I hope you feel better soon.     Call your primary care doctor to make the first available appointment.     Keep all your medical appointments.     Take your regular medication as prescribed. Contact your primary care provider before running out of medication, or for any problems obtaining them.    Do not drive or operate heavy machinery while taking opioid or muscle relaxing medications. Examples include norco, percocet, xanax, valium, flexeril.     Overuse or long term use of pain and sedating medication may lead to addiction, dependence, organ failure, family and work problems, legal problems, accidental overdose, and death.    If you do not have health insurance, you probably can afford it:  Call [phone] ([facility] hotline) or go to www.[employer].inc ([place].gov    Your evaluation in the ER does not suggest any emergent or life threatening medical condition requiring admission or immediate intervention beyond that provided in the ER.   However, the signs of a serious problem sometimes take more time to appear.     Do not hesitate to return to the ER if any of the following occur:    Weakness, dizziness, fainting, or loss of consciousness   Fever of 100.4ºF (38ºC) or higher  Any worse symptoms  Any new or concerning symptoms      For help with substance abuse problems:    If you do not have insurance contact [facility] at [phone].    You may also contact [facility] ([phone]) or [facility] ([phone]).    [facility] ([facility]) Crisis Services for problems with mental health (suicidal, overwhelmed, agitated, despressed, withdrawn, etc.), problems with drugs/alcohol, or developmental disabilities. Accepts uninsured and medicaid:   [phone] or [phone]   [facility] websites:   www.mhsdla.org/services/crisis   www.sdla.org     [facility]:   Accepts Medicaid and  uninsured [place] residents.   Accepts detox walk-ins weekdays between 7 a.m. and 3 p.m. Acceptance is based on bed availability.  [place] PHOTO ID REQUIRED.   Requirements: must be at least 22 years old.   [facility] also has residential substance treatment/rehab programs after intial detox is accomplished.   [phone]   www.[facility].org       [facility] (men, at least 18 years old) & [facility] (women, at least 18 years old):   NO initial Detox. They don't do initial detox nor medically assisted withdrawals. Clients can go to their choice of initial detox at [facility], [facility], [place], [place], or wherever [facility] would advise.   Has long-term residential substance abuse treatment programs, where clients live and participate in intense rehabilitation.   [phone]   www.[facility]:   Accepts Medicaid, Medicare, , and many private insurances.  Call daily at 10:30 a.m. to see if a bed is available.   Usually has a waiting list, call for more info.  [phone] or [phone]   www.[employer]     [facility]:   [address], [place] [address]   [phone]   Accepts Medicaid ([employer] Caritas, Healthy Blue, and Aetna) and private insurances. Does not accept Medicare.     [facility] ([place], [place]):  [phone]  Accepts Medicaid and many private insurances.   Call daily between 8:30 and 9 a.m. to see if a bed is available.   Provides transportation to and from facility.    [facility] [address], [place] [address]   [phone]   Accepts Medicaid, Medicare, , and many private insurances.   Call for more information.     [facility] ([place], [place]):  [phone]  Accepts Medicaid, uninsured, and many private insurances.   Usually has a waiting list, call for more info.     [facility] ([place], [place]):  [phone]  Accepts certain Medicaid plans and many private insurances.  Usually has a  waiting list, call for more info.    [facility] ([place], [place]):  [address] [place] [address]   [phone]    OUT-Patient resources:   [facility] (No age limits)   Offers intensive outpatient treatment, medically-assisted outpatient detox with suboxone, counseling, AA/12 Steps, etc  Accepts uninsured residents of [place], [place], or [facility]. Residents of other [facility] must contact the Human Services District in their [place] for options.   [facility] locations:   [place] [phone]   [place]: [phone]   [place]: [phone]           [place]:   Accepts insurances, cash, credit card or financing plan for payment  [phone]   In [place]: [phone]   In [place]: [phone]   ---- ----       ADDITIONAL Addiction & Mental Health RESOURCES:   Call 211 or the 24/7 [facility] Crisis Response Team at [phone].   Call for information on current local resources for addiction, suicide prevention, help on how to cope, obtain services, etc.       [facility] - Crisis Services (For [place] Residents needing care for mental health, addiction or developmental disabilities)   [phone]   www.[facility].org     SAMA - Substance Abuse & Mental Health Services Administration (Educational, not a Crisis resource)   www.samhsa.org   Various meds used for M.A.T. (medically assisted treatment) of opioid addiction:   http://www.samhsa.gov/medication-assisted-treatment         Other Mental Health Clinics include:     [facility] [address]. [phone]     [facility] [address] [phone]     [facility] [address] [phone]     [facility] [address] [phone]     Alcoholics Anonymous:  Go to www.[place].org for locations and times  Locations with multiple meetings per day:  [place] - [address] ([facility])  [facility] - [address] ([facility])  [facility]  [place] - [address] ([facility])  [place] - [address] ([place])    Anyone who is suicidal may receive immediate help by going to the ER, calling 911, going to Suicide.org or by calling 8-397-XQMVJLM. Suicide is preventable, and if you are feeling suicidal, you must get help.

## 2023-10-07 NOTE — ED TRIAGE NOTES
Pt reports to ED with c/o need for medication. Pt has been getting IV abx for endocarditis and had appointment to have infusion yesterday but was unable to make it. Pt denies any new complaints at this time.

## 2023-10-07 NOTE — ED PROVIDER NOTES
"  Source of History:  Medical record, patient, sister      Chief complaint:  Per triage note: "IV Medication (Pt stats that he was supposed to receive IV antibiotics at [facility] yesterday for MRSA infection but missed appointment. )  "    HPI:    Patient presents with request for IV antibiotic infusion following missed appointment. They state patient had an appointment yesterday for antibiotic infusion, however [facility], causing him to miss the point. He denies new rashes, dyspnea, chest pain, lightheadedness, fevers, or other pains/symptoms.     ROS:   See HPI for pertinent Review of Systems      Review of patient's allergies indicates:  No Known Allergies    PMH:  As per HPI and below:  Past Medical History:   Diagnosis Date    IVDU (intravenous drug user)        No past surgical history on file.    Social History     Tobacco Use    Smoking status: Every Day     Types: Cigarettes   Substance Use Topics    Alcohol use: Not Currently    Drug use: Yes     Types: Heroin       Physical Exam:      Nursing note and vitals reviewed.  BP (!) 111/58   Pulse 99   Temp 98 °F (36.7 °C) (Oral)   Resp 18   Ht 6' (1.829 m)   Wt 65.8 kg (145 lb)   SpO2 100%   BMI 19.67 kg/m²     Constitutional:  Sister at bedside. No distress.  Eyes: EOMI. No discharge. Anicteric.  HENT:   Neck: Normal range of motion. Neck supple.  Cardiovascular:  Tachycardic rate. No murmur, no gallop and no friction rub heard.   Pulmonary/Chest: No respiratory distress. Effort normal. No wheezes, no rales, no rhonchi.   Abdominal: Bowel sounds normal. Soft. No distension and no mass. There is no tenderness. There is no rebound, no guarding, no tenderness at McBurney's point.  Neurological: GCS 15. Alert and oriented to person, place, and time. No gross cranial nerve, light touch or strength deficit. Coordination normal.   Skin: Skin is warm and dry.   EXT: 2+ radial pulses.   Psychiatric: Behavior is normal. Judgment normal.    Medical " Decision Making / Independent Interpretations / External Records Reviewed:      ED Course as of 10/08/23 1349   Sat Oct 07, 2023   1128 Patient is a 28-year-old male history of IV drug use, endocarditis who presents IV infusion of Dalbavancin / Dalvance.  He was scheduled to receiving infusion yesterday, but was in care home, causing him to miss his appointment.  He denies any interval complaints, including skin changes, new pain, fevers, chills, dyspnea.   On exam, patient has no murmur, is tachycardic, but afebrile.   The initial differential included bacteremia, endocarditis, sepsis.   Patient history, findings, results, patient management discussed with clinical pharmacist to facilitate patient receiving dose of dalbavancin.   Given his tachycardia, will obtain screening labs.  [RC]   1301 I independently reviewed and interpreted labs which are notable for hypernatremia, normal lactic acid.     [RC]   1326 Patient's tachycardia has resolved. HR currently 99 on the monitor.       =====================================  Critical Care:  40 minutes total critical care time was personally spent by me, exclusive of procedures and separately billable time.   Critical care was necessary to treat or prevent imminent or life-threatening deterioration of the following conditions:  Endocarditis, sepsis, bacteremia  =====================================    --  I discussed with pt and/or guardian/caretaker that this evaluation in the ED does not suggest any emergent or life threatening medical condition requiring admission or further immediate intervention or diagnostics. Regardless, an unremarkable evaluation in the ED does not preclude the development or presence of a serious or life threatening condition. Pt was instructed to return for any worsening, new, changed, or concerning symptoms.     I had a detailed discussion with patient and/or guardian/caretaker regarding findings, plan, return precautions, importance of medication  adherence, need to follow-up with a PCP and specialist. All questions answered.     Note was created using voice recognition software. It may have occasional typographical errors not identified and edited despite initial review prior to signing.   [RC]      ED Course User Index  [RC] Sukhdev Gilbert MD       I decided to obtain the patient's medical records. I reviewed patient's prior external notes / results: inpatient admission documentation and specialist note   .     Additional Medical Decision Making: Hospitalization or escalation of care considered    Medications   sodium chloride 0.9% bolus 1,000 mL 1,000 mL (0 mLs Intravenous Stopped 10/7/23 1309)   dalbavancin (DALVANCE) 1,500 mg in dextrose 5 % (D5W) 325 mL infusion (0 mg Intravenous Stopped 10/7/23 1317)              Diagnostic Impression:    1. Endocarditis, unspecified chronicity, unspecified endocarditis type    2. History of subacute bacterial endocarditis    3. History of drug use         ED Disposition Condition    Discharge Good          No future appointments.   ED Prescriptions    None       Follow-up Information       Follow up With Specialties Details Why Contact Info    Your primary care doctor  In 2 days For recheck with your primary care doctor             ---  I, Ryan Guzman, scribed for, and in the presence of, Dr. Gilbert. I performed the scribed service and the documentation accurately describes the services I performed. I attest to the accuracy of the note.     Physician Attestation for Scribe:   I, Sukhdev Gilbert MD, reviewed documentation as scribed in my presence, which is both accurate and complete.      Sukhdev Gilbert MD  10/08/23 3160

## 2023-10-12 LAB
BACTERIA BLD CULT: NORMAL
BACTERIA BLD CULT: NORMAL

## 2023-11-05 PROBLEM — R53.83 LETHARGY: Status: ACTIVE | Noted: 2023-11-05

## 2023-11-05 PROBLEM — F17.200 TOBACCO DEPENDENCE: Status: ACTIVE | Noted: 2023-11-05

## 2023-11-05 PROBLEM — I95.9 HYPOTENSION: Status: ACTIVE | Noted: 2023-11-05

## 2023-11-06 ENCOUNTER — HOSPITAL ENCOUNTER (INPATIENT)
Facility: OTHER | Age: 28
LOS: 3 days | Discharge: HOME | DRG: 289 | End: 2023-11-09
Attending: EMERGENCY MEDICINE | Admitting: INTERNAL MEDICINE
Payer: MEDICAID

## 2023-11-06 ENCOUNTER — TELEPHONE (OUTPATIENT)
Dept: HEPATOLOGY | Facility: CLINIC | Age: 28
End: 2023-11-06
Payer: MEDICAID

## 2023-11-06 DIAGNOSIS — Z86.79 HISTORY OF ENDOCARDITIS: ICD-10-CM

## 2023-11-06 DIAGNOSIS — I38 ENDOCARDITIS: ICD-10-CM

## 2023-11-06 DIAGNOSIS — R07.9 CHEST PAIN: ICD-10-CM

## 2023-11-06 DIAGNOSIS — Z87.898 HISTORY OF INTRAVENOUS DRUG USE IN REMISSION: Primary | ICD-10-CM

## 2023-11-06 DIAGNOSIS — R00.0 TACHYCARDIA: ICD-10-CM

## 2023-11-06 LAB
ALBUMIN SERPL BCP-MCNC: 3.6 G/DL (ref 3.5–5.2)
ALP SERPL-CCNC: 166 U/L (ref 55–135)
ALT SERPL W/O P-5'-P-CCNC: 161 U/L (ref 10–44)
ANION GAP SERPL CALC-SCNC: 10 MMOL/L (ref 8–16)
AST SERPL-CCNC: 111 U/L (ref 10–40)
BASOPHILS # BLD AUTO: 0.03 K/UL (ref 0–0.2)
BASOPHILS NFR BLD: 0.4 % (ref 0–1.9)
BILIRUB SERPL-MCNC: 0.6 MG/DL (ref 0.1–1)
BUN SERPL-MCNC: 8 MG/DL (ref 6–20)
CALCIUM SERPL-MCNC: 9.3 MG/DL (ref 8.7–10.5)
CHLORIDE SERPL-SCNC: 102 MMOL/L (ref 95–110)
CO2 SERPL-SCNC: 24 MMOL/L (ref 23–29)
CREAT SERPL-MCNC: 1 MG/DL (ref 0.5–1.4)
DIFFERENTIAL METHOD: ABNORMAL
EOSINOPHIL # BLD AUTO: 0.2 K/UL (ref 0–0.5)
EOSINOPHIL NFR BLD: 2.8 % (ref 0–8)
ERYTHROCYTE [DISTWIDTH] IN BLOOD BY AUTOMATED COUNT: 16.1 % (ref 11.5–14.5)
EST. GFR  (NO RACE VARIABLE): >60 ML/MIN/1.73 M^2
GLUCOSE SERPL-MCNC: 88 MG/DL (ref 70–110)
HCT VFR BLD AUTO: 35.6 % (ref 40–54)
HGB BLD-MCNC: 11.4 G/DL (ref 14–18)
IMM GRANULOCYTES # BLD AUTO: 0.03 K/UL (ref 0–0.04)
IMM GRANULOCYTES NFR BLD AUTO: 0.4 % (ref 0–0.5)
LACTATE SERPL-SCNC: 0.8 MMOL/L (ref 0.5–2.2)
LYMPHOCYTES # BLD AUTO: 2.5 K/UL (ref 1–4.8)
LYMPHOCYTES NFR BLD: 30.2 % (ref 18–48)
MCH RBC QN AUTO: 26.5 PG (ref 27–31)
MCHC RBC AUTO-ENTMCNC: 32 G/DL (ref 32–36)
MCV RBC AUTO: 83 FL (ref 82–98)
MONOCYTES # BLD AUTO: 0.5 K/UL (ref 0.3–1)
MONOCYTES NFR BLD: 6.4 % (ref 4–15)
NEUTROPHILS # BLD AUTO: 5 K/UL (ref 1.8–7.7)
NEUTROPHILS NFR BLD: 59.8 % (ref 38–73)
NRBC BLD-RTO: 0 /100 WBC
PLATELET # BLD AUTO: 230 K/UL (ref 150–450)
PMV BLD AUTO: 9.6 FL (ref 9.2–12.9)
POTASSIUM SERPL-SCNC: 3.8 MMOL/L (ref 3.5–5.1)
PROT SERPL-MCNC: 7.4 G/DL (ref 6–8.4)
RBC # BLD AUTO: 4.3 M/UL (ref 4.6–6.2)
SODIUM SERPL-SCNC: 136 MMOL/L (ref 136–145)
TROPONIN I SERPL DL<=0.01 NG/ML-MCNC: <0.006 NG/ML (ref 0–0.03)
WBC # BLD AUTO: 8.31 K/UL (ref 3.9–12.7)

## 2023-11-06 PROCEDURE — 87040 BLOOD CULTURE FOR BACTERIA: CPT | Mod: 59 | Performed by: EMERGENCY MEDICINE

## 2023-11-06 PROCEDURE — 25000003 PHARM REV CODE 250: Performed by: EMERGENCY MEDICINE

## 2023-11-06 PROCEDURE — 93010 ELECTROCARDIOGRAM REPORT: CPT | Mod: ,,, | Performed by: INTERNAL MEDICINE

## 2023-11-06 PROCEDURE — 93010 ELECTROCARDIOGRAM REPORT: CPT | Mod: 59,,, | Performed by: INTERNAL MEDICINE

## 2023-11-06 PROCEDURE — 80053 COMPREHEN METABOLIC PANEL: CPT | Mod: 91 | Performed by: EMERGENCY MEDICINE

## 2023-11-06 PROCEDURE — 93005 ELECTROCARDIOGRAM TRACING: CPT

## 2023-11-06 PROCEDURE — G0378 HOSPITAL OBSERVATION PER HR: HCPCS

## 2023-11-06 PROCEDURE — 84484 ASSAY OF TROPONIN QUANT: CPT | Performed by: EMERGENCY MEDICINE

## 2023-11-06 PROCEDURE — 93010 EKG 12-LEAD: ICD-10-PCS | Mod: ,,, | Performed by: INTERNAL MEDICINE

## 2023-11-06 PROCEDURE — 96360 HYDRATION IV INFUSION INIT: CPT

## 2023-11-06 PROCEDURE — 85025 COMPLETE CBC W/AUTO DIFF WBC: CPT | Mod: 91 | Performed by: EMERGENCY MEDICINE

## 2023-11-06 PROCEDURE — 99285 EMERGENCY DEPT VISIT HI MDM: CPT | Mod: 25

## 2023-11-06 PROCEDURE — 12000002 HC ACUTE/MED SURGE SEMI-PRIVATE ROOM

## 2023-11-06 PROCEDURE — 83605 ASSAY OF LACTIC ACID: CPT | Performed by: EMERGENCY MEDICINE

## 2023-11-06 PROCEDURE — 81003 URINALYSIS AUTO W/O SCOPE: CPT | Mod: 91 | Performed by: EMERGENCY MEDICINE

## 2023-11-06 RX ORDER — TALC
6 POWDER (GRAM) TOPICAL NIGHTLY PRN
Status: DISCONTINUED | OUTPATIENT
Start: 2023-11-07 | End: 2023-11-09 | Stop reason: HOSPADM

## 2023-11-06 RX ORDER — DIPHENHYDRAMINE HYDROCHLORIDE 50 MG/ML
25 INJECTION INTRAMUSCULAR; INTRAVENOUS NIGHTLY PRN
Status: DISCONTINUED | OUTPATIENT
Start: 2023-11-07 | End: 2023-11-07

## 2023-11-06 RX ORDER — SODIUM CHLORIDE 9 MG/ML
INJECTION, SOLUTION INTRAVENOUS CONTINUOUS
Status: DISCONTINUED | OUTPATIENT
Start: 2023-11-07 | End: 2023-11-08

## 2023-11-06 RX ORDER — ONDANSETRON 2 MG/ML
4 INJECTION INTRAMUSCULAR; INTRAVENOUS EVERY 8 HOURS PRN
Status: DISCONTINUED | OUTPATIENT
Start: 2023-11-07 | End: 2023-11-09 | Stop reason: HOSPADM

## 2023-11-06 RX ORDER — MIRTAZAPINE 15 MG/1
15 TABLET, FILM COATED ORAL NIGHTLY
Status: DISCONTINUED | OUTPATIENT
Start: 2023-11-06 | End: 2023-11-09 | Stop reason: HOSPADM

## 2023-11-06 RX ORDER — KETOROLAC TROMETHAMINE 30 MG/ML
10 INJECTION, SOLUTION INTRAMUSCULAR; INTRAVENOUS EVERY 8 HOURS PRN
Status: ACTIVE | OUTPATIENT
Start: 2023-11-07 | End: 2023-11-08

## 2023-11-06 RX ORDER — SODIUM CHLORIDE 0.9 % (FLUSH) 0.9 %
10 SYRINGE (ML) INJECTION
Status: DISCONTINUED | OUTPATIENT
Start: 2023-11-07 | End: 2023-11-09 | Stop reason: HOSPADM

## 2023-11-06 RX ORDER — ACETAMINOPHEN 325 MG/1
650 TABLET ORAL EVERY 6 HOURS PRN
Status: DISCONTINUED | OUTPATIENT
Start: 2023-11-07 | End: 2023-11-09 | Stop reason: HOSPADM

## 2023-11-06 RX ORDER — MIRTAZAPINE 15 MG/1
15 TABLET, FILM COATED ORAL NIGHTLY
COMMUNITY

## 2023-11-06 RX ORDER — LORAZEPAM 2 MG/ML
1 INJECTION INTRAMUSCULAR ONCE AS NEEDED
Status: DISCONTINUED | OUTPATIENT
Start: 2023-11-07 | End: 2023-11-09 | Stop reason: HOSPADM

## 2023-11-06 RX ORDER — BUPRENORPHINE HYDROCHLORIDE AND NALOXONE HYDROCHLORIDE DIHYDRATE 8; 2 MG/1; MG/1
8 TABLET SUBLINGUAL 2 TIMES DAILY
Status: DISCONTINUED | OUTPATIENT
Start: 2023-11-07 | End: 2023-11-09 | Stop reason: HOSPADM

## 2023-11-06 RX ADMIN — SODIUM CHLORIDE 1000 ML: 9 INJECTION, SOLUTION INTRAVENOUS at 10:11

## 2023-11-06 NOTE — Clinical Note
Diagnosis: History of intravenous drug use in remission [056505]   Future Attending Provider: DIMITRIS MARKS [[id number]]   Is the patient being sent to ED Observation?: No   Admitting Provider:: DIMITRIS MARKS [[id number]]

## 2023-11-06 NOTE — TELEPHONE ENCOUNTER
Rebecca Trotter DNP ordered that patient be scheduled for a hepatology consult visit for hep c.  Patient hep c quant positive.  Attempt made to reach him for scheduling with PA Scheuermann.  I lvm asking that he call hepatology back.

## 2023-11-07 ENCOUNTER — TELEPHONE (OUTPATIENT)
Dept: HEPATOLOGY | Facility: CLINIC | Age: 28
End: 2023-11-07
Payer: MEDICAID

## 2023-11-07 PROBLEM — E53.8 FOLATE DEFICIENCY: Status: ACTIVE | Noted: 2023-09-27

## 2023-11-07 PROBLEM — D64.9 ABSOLUTE ANEMIA: Status: ACTIVE | Noted: 2023-09-27

## 2023-11-07 PROBLEM — B17.10 ACUTE HEPATITIS C VIRUS INFECTION WITHOUT HEPATIC COMA: Status: ACTIVE | Noted: 2023-09-26

## 2023-11-07 LAB
ALBUMIN SERPL BCP-MCNC: 3 G/DL (ref 3.5–5.2)
ALP SERPL-CCNC: 136 U/L (ref 55–135)
ALT SERPL W/O P-5'-P-CCNC: 135 U/L (ref 10–44)
ANION GAP SERPL CALC-SCNC: 9 MMOL/L (ref 8–16)
AORTIC ROOT ANNULUS: 2.53 CM
ASCENDING AORTA: 2.27 CM
AST SERPL-CCNC: 92 U/L (ref 10–40)
AV INDEX (PROSTH): 0.82
AV MEAN GRADIENT: 4 MMHG
AV PEAK GRADIENT: 7 MMHG
AV VALVE AREA BY VELOCITY RATIO: 2.68 CM²
AV VALVE AREA: 2.56 CM²
AV VELOCITY RATIO: 0.86
BASOPHILS # BLD AUTO: 0.03 K/UL (ref 0–0.2)
BASOPHILS NFR BLD: 0.5 % (ref 0–1.9)
BILIRUB SERPL-MCNC: 0.5 MG/DL (ref 0.1–1)
BILIRUB UR QL STRIP: NEGATIVE
BSA FOR ECHO PROCEDURE: 1.94 M2
BUN SERPL-MCNC: 9 MG/DL (ref 6–20)
CALCIUM SERPL-MCNC: 8.9 MG/DL (ref 8.7–10.5)
CHLORIDE SERPL-SCNC: 106 MMOL/L (ref 95–110)
CLARITY UR: CLEAR
CO2 SERPL-SCNC: 24 MMOL/L (ref 23–29)
COLOR UR: COLORLESS
CREAT SERPL-MCNC: 0.8 MG/DL (ref 0.5–1.4)
CV ECHO LV RWT: 0.5 CM
DIFFERENTIAL METHOD: ABNORMAL
DOP CALC AO PEAK VEL: 1.3 M/S
DOP CALC AO VTI: 26.2 CM
DOP CALC LVOT AREA: 3.1 CM2
DOP CALC LVOT DIAMETER: 1.99 CM
DOP CALC LVOT PEAK VEL: 1.12 M/S
DOP CALC LVOT STROKE VOLUME: 67.15 CM3
DOP CALCLVOT PEAK VEL VTI: 21.6 CM
E WAVE DECELERATION TIME: 161.1 MSEC
E/A RATIO: 1.26
E/E' RATIO: 3.37 M/S
ECHO LV POSTERIOR WALL: 1.01 CM (ref 0.6–1.1)
EJECTION FRACTION: 65 %
EOSINOPHIL # BLD AUTO: 0.2 K/UL (ref 0–0.5)
EOSINOPHIL NFR BLD: 3.5 % (ref 0–8)
ERYTHROCYTE [DISTWIDTH] IN BLOOD BY AUTOMATED COUNT: 16 % (ref 11.5–14.5)
EST. GFR  (NO RACE VARIABLE): >60 ML/MIN/1.73 M^2
FRACTIONAL SHORTENING: 37 % (ref 28–44)
GLUCOSE SERPL-MCNC: 97 MG/DL (ref 70–110)
GLUCOSE UR QL STRIP: NEGATIVE
HCT VFR BLD AUTO: 32.8 % (ref 40–54)
HGB BLD-MCNC: 10.4 G/DL (ref 14–18)
HGB UR QL STRIP: NEGATIVE
IMM GRANULOCYTES # BLD AUTO: 0.01 K/UL (ref 0–0.04)
IMM GRANULOCYTES NFR BLD AUTO: 0.2 % (ref 0–0.5)
INTERVENTRICULAR SEPTUM: 0.93 CM (ref 0.6–1.1)
IVC DIAMETER: 2.07 CM
IVRT: 51.38 MSEC
KETONES UR QL STRIP: NEGATIVE
LA MAJOR: 4.94 CM
LA MINOR: 4.92 CM
LA WIDTH: 3.8 CM
LAAS-AP2: 20 CM2
LAAS-AP4: 18 CM2
LEFT ATRIUM SIZE: 3.34 CM
LEFT ATRIUM VOLUME INDEX: 27.1 ML/M2
LEFT ATRIUM VOLUME: 53.19 CM3
LEFT INTERNAL DIMENSION IN SYSTOLE: 2.56 CM (ref 2.1–4)
LEFT VENTRICLE DIASTOLIC VOLUME INDEX: 36.49 ML/M2
LEFT VENTRICLE DIASTOLIC VOLUME: 71.52 ML
LEFT VENTRICLE MASS INDEX: 63 G/M2
LEFT VENTRICLE SYSTOLIC VOLUME INDEX: 12.1 ML/M2
LEFT VENTRICLE SYSTOLIC VOLUME: 23.78 ML
LEFT VENTRICULAR INTERNAL DIMENSION IN DIASTOLE: 4.04 CM (ref 3.5–6)
LEFT VENTRICULAR MASS: 123.66 G
LEUKOCYTE ESTERASE UR QL STRIP: NEGATIVE
LV LATERAL E/E' RATIO: 2.68 M/S
LV SEPTAL E/E' RATIO: 4.54 M/S
LVOT MG: 2.39 MMHG
LVOT MV: 0.72 CM/S
LYMPHOCYTES # BLD AUTO: 2.7 K/UL (ref 1–4.8)
LYMPHOCYTES NFR BLD: 43.2 % (ref 18–48)
MCH RBC QN AUTO: 26.3 PG (ref 27–31)
MCHC RBC AUTO-ENTMCNC: 31.7 G/DL (ref 32–36)
MCV RBC AUTO: 83 FL (ref 82–98)
MONOCYTES # BLD AUTO: 0.6 K/UL (ref 0.3–1)
MONOCYTES NFR BLD: 9.9 % (ref 4–15)
MV PEAK A VEL: 0.47 M/S
MV PEAK E VEL: 0.59 M/S
MV STENOSIS PRESSURE HALF TIME: 46.72 MS
MV VALVE AREA P 1/2 METHOD: 4.71 CM2
NEUTROPHILS # BLD AUTO: 2.7 K/UL (ref 1.8–7.7)
NEUTROPHILS NFR BLD: 42.7 % (ref 38–73)
NITRITE UR QL STRIP: NEGATIVE
NRBC BLD-RTO: 0 /100 WBC
PH UR STRIP: 7 [PH] (ref 5–8)
PISA MRMAX VEL: 2.57 M/S
PISA TR MAX VEL: 2.46 M/S
PLATELET # BLD AUTO: 214 K/UL (ref 150–450)
PMV BLD AUTO: 9.9 FL (ref 9.2–12.9)
POTASSIUM SERPL-SCNC: 4.1 MMOL/L (ref 3.5–5.1)
PROT SERPL-MCNC: 6.3 G/DL (ref 6–8.4)
PROT UR QL STRIP: NEGATIVE
PV MV: 0.44 M/S
PV PEAK GRADIENT: 1 MMHG
PV PEAK VELOCITY: 0.6 M/S
RA MAJOR: 5.52 CM
RA PRESSURE ESTIMATED: 8 MMHG
RA WIDTH: 4.9 CM
RBC # BLD AUTO: 3.96 M/UL (ref 4.6–6.2)
RIGHT VENTRICULAR END-DIASTOLIC DIMENSION: 3.04 CM
RV TB RVSP: 10 MMHG
RV TISSUE DOPPLER FREE WALL SYSTOLIC VELOCITY 1 (APICAL 4 CHAMBER VIEW): 15.83 CM/S
SINUS: 2.7 CM
SODIUM SERPL-SCNC: 139 MMOL/L (ref 136–145)
SP GR UR STRIP: 1.01 (ref 1–1.03)
STJ: 2.93 CM
TDI LATERAL: 0.22 M/S
TDI SEPTAL: 0.13 M/S
TDI: 0.18 M/S
TR MAX PG: 24 MMHG
TRICUSPID ANNULAR PLANE SYSTOLIC EXCURSION: 2.1 CM
TROPONIN I SERPL DL<=0.01 NG/ML-MCNC: <0.006 NG/ML (ref 0–0.03)
TV REST PULMONARY ARTERY PRESSURE: 32 MMHG
URN SPEC COLLECT METH UR: ABNORMAL
UROBILINOGEN UR STRIP-ACNC: NEGATIVE EU/DL
WBC # BLD AUTO: 6.29 K/UL (ref 3.9–12.7)
Z-SCORE OF LEFT VENTRICULAR DIMENSION IN END DIASTOLE: -3.28
Z-SCORE OF LEFT VENTRICULAR DIMENSION IN END SYSTOLE: -2.36

## 2023-11-07 PROCEDURE — 63600175 PHARM REV CODE 636 W HCPCS: Performed by: NURSE PRACTITIONER

## 2023-11-07 PROCEDURE — 11000001 HC ACUTE MED/SURG PRIVATE ROOM

## 2023-11-07 PROCEDURE — 63600175 PHARM REV CODE 636 W HCPCS: Performed by: PHYSICIAN ASSISTANT

## 2023-11-07 PROCEDURE — 25000003 PHARM REV CODE 250: Performed by: NURSE PRACTITIONER

## 2023-11-07 PROCEDURE — 85025 COMPLETE CBC W/AUTO DIFF WBC: CPT | Performed by: NURSE PRACTITIONER

## 2023-11-07 PROCEDURE — 25000003 PHARM REV CODE 250: Performed by: PHYSICIAN ASSISTANT

## 2023-11-07 PROCEDURE — 63600175 PHARM REV CODE 636 W HCPCS: Mod: JZ,JG | Performed by: PHYSICIAN ASSISTANT

## 2023-11-07 PROCEDURE — 80053 COMPREHEN METABOLIC PANEL: CPT | Performed by: NURSE PRACTITIONER

## 2023-11-07 PROCEDURE — 84484 ASSAY OF TROPONIN QUANT: CPT | Performed by: NURSE PRACTITIONER

## 2023-11-07 RX ORDER — BISACODYL 10 MG
10 SUPPOSITORY, RECTAL RECTAL DAILY PRN
Status: DISCONTINUED | OUTPATIENT
Start: 2023-11-07 | End: 2023-11-09 | Stop reason: HOSPADM

## 2023-11-07 RX ORDER — ACETAMINOPHEN 500 MG
1000 TABLET ORAL EVERY 8 HOURS PRN
Status: DISCONTINUED | OUTPATIENT
Start: 2023-11-07 | End: 2023-11-09 | Stop reason: HOSPADM

## 2023-11-07 RX ORDER — IBUPROFEN 200 MG
16 TABLET ORAL
Status: DISCONTINUED | OUTPATIENT
Start: 2023-11-07 | End: 2023-11-09 | Stop reason: HOSPADM

## 2023-11-07 RX ORDER — FERROUS GLUCONATE 324(38)MG
324 TABLET ORAL
COMMUNITY
Start: 2023-09-29

## 2023-11-07 RX ORDER — CYCLOBENZAPRINE HCL 5 MG
5 TABLET ORAL EVERY 8 HOURS PRN
Status: DISCONTINUED | OUTPATIENT
Start: 2023-11-07 | End: 2023-11-09 | Stop reason: HOSPADM

## 2023-11-07 RX ORDER — POLYETHYLENE GLYCOL 3350 17 G/17G
17 POWDER, FOR SOLUTION ORAL DAILY
Status: DISCONTINUED | OUTPATIENT
Start: 2023-11-07 | End: 2023-11-07

## 2023-11-07 RX ORDER — MAG HYDROX/ALUMINUM HYD/SIMETH 200-200-20
30 SUSPENSION, ORAL (FINAL DOSE FORM) ORAL 4 TIMES DAILY PRN
Status: DISCONTINUED | OUTPATIENT
Start: 2023-11-07 | End: 2023-11-09 | Stop reason: HOSPADM

## 2023-11-07 RX ORDER — NALOXONE HCL 0.4 MG/ML
0.02 VIAL (ML) INJECTION
Status: DISCONTINUED | OUTPATIENT
Start: 2023-11-07 | End: 2023-11-09 | Stop reason: HOSPADM

## 2023-11-07 RX ORDER — MIRTAZAPINE 15 MG/1
15 TABLET, FILM COATED ORAL NIGHTLY PRN
Status: DISCONTINUED | OUTPATIENT
Start: 2023-11-07 | End: 2023-11-09 | Stop reason: HOSPADM

## 2023-11-07 RX ORDER — LANOLIN ALCOHOL/MO/W.PET/CERES
1 CREAM (GRAM) TOPICAL DAILY
Status: DISCONTINUED | OUTPATIENT
Start: 2023-11-08 | End: 2023-11-09 | Stop reason: HOSPADM

## 2023-11-07 RX ORDER — SIMETHICONE 80 MG
1 TABLET,CHEWABLE ORAL 4 TIMES DAILY PRN
Status: DISCONTINUED | OUTPATIENT
Start: 2023-11-07 | End: 2023-11-09 | Stop reason: HOSPADM

## 2023-11-07 RX ORDER — SODIUM CHLORIDE 0.9 % (FLUSH) 0.9 %
5 SYRINGE (ML) INJECTION
Status: DISCONTINUED | OUTPATIENT
Start: 2023-11-07 | End: 2023-11-09 | Stop reason: HOSPADM

## 2023-11-07 RX ORDER — FOLIC ACID 1 MG/1
1 TABLET ORAL DAILY
COMMUNITY
Start: 2023-09-30

## 2023-11-07 RX ORDER — FOLIC ACID 1 MG/1
1 TABLET ORAL DAILY
Status: DISCONTINUED | OUTPATIENT
Start: 2023-11-08 | End: 2023-11-09 | Stop reason: HOSPADM

## 2023-11-07 RX ORDER — IBUPROFEN 200 MG
24 TABLET ORAL
Status: DISCONTINUED | OUTPATIENT
Start: 2023-11-07 | End: 2023-11-09 | Stop reason: HOSPADM

## 2023-11-07 RX ORDER — GLUCAGON 1 MG
1 KIT INJECTION
Status: DISCONTINUED | OUTPATIENT
Start: 2023-11-07 | End: 2023-11-09 | Stop reason: HOSPADM

## 2023-11-07 RX ORDER — HYDROXYZINE HYDROCHLORIDE 25 MG/1
50 TABLET, FILM COATED ORAL EVERY 6 HOURS PRN
Status: DISCONTINUED | OUTPATIENT
Start: 2023-11-07 | End: 2023-11-09 | Stop reason: HOSPADM

## 2023-11-07 RX ADMIN — CEFTAROLINE FOSAMIL 600 MG: 600 POWDER, FOR SOLUTION INTRAVENOUS at 04:11

## 2023-11-07 RX ADMIN — MIRTAZAPINE 15 MG: 15 TABLET, FILM COATED ORAL at 08:11

## 2023-11-07 RX ADMIN — BUPRENORPHINE AND NALOXONE 8 MG: 8; 2 TABLET SUBLINGUAL at 09:11

## 2023-11-07 RX ADMIN — MIRTAZAPINE 15 MG: 15 TABLET, FILM COATED ORAL at 12:11

## 2023-11-07 RX ADMIN — SODIUM CHLORIDE: 9 INJECTION, SOLUTION INTRAVENOUS at 08:11

## 2023-11-07 RX ADMIN — BUPRENORPHINE AND NALOXONE 8 MG: 8; 2 TABLET SUBLINGUAL at 08:11

## 2023-11-07 RX ADMIN — SODIUM CHLORIDE: 9 INJECTION, SOLUTION INTRAVENOUS at 11:11

## 2023-11-07 RX ADMIN — SODIUM CHLORIDE: 9 INJECTION, SOLUTION INTRAVENOUS at 12:11

## 2023-11-07 RX ADMIN — DAPTOMYCIN 595 MG: 350 INJECTION, POWDER, LYOPHILIZED, FOR SOLUTION INTRAVENOUS at 03:11

## 2023-11-07 NOTE — ED PROVIDER NOTES
"  Source of History:  Medical record, patient, pt's father.       Chief complaint:  Per triage note: "Weakness (Pt sent here today from [facility] for possible endocarditis after he was diagnosed with endocarditis & took IV antibiotics last month. Pt has no complaints. Denies drug use since admission. )  "    HPI:    Patient presents referred from his residential rehabilitation facility requested the patient receive antibiotics for endocarditis.  Patient denies any complaints, noting that he is tachycardic.  Patient was admitted here yesterday for hypotension, concern for endocarditis vs sepsis vs occult infection.  After approximately 24 hours observation and admission to the hospitalist Service, patient had improvement of his heart rate, resolution of hypotension, no focus of infection.  There was no plan for him to be discharged with antibiotics.  Echocardiogram was not performed prior to discharge.   Patient states that his recovery at [facility] is going well so far.    ROS:   See HPI for pertinent Review of Systems      Review of patient's allergies indicates:  No Known Allergies    PMH:  As per HPI and below:  Past Medical History:   Diagnosis Date    IVDU (intravenous drug user)        No past surgical history on file.    Social History     Tobacco Use    Smoking status: Every Day     Types: Cigarettes   Substance Use Topics    Alcohol use: Not Currently    Drug use: Yes     Types: Heroin       Physical Exam:      Nursing note and vitals reviewed.  /70 (BP Location: Right arm, Patient Position: Lying)   Pulse 103   Temp 97.9 °F (36.6 °C) (Oral)   Resp 18   Wt 74.4 kg (164 lb)   SpO2 98%   BMI 22.24 kg/m²     Constitutional: No distress.  Father at bedside.  Overall, appears better than on prior encounters over last 6 weeks.  Eyes: EOMI. No discharge. Anicteric.  HENT:   Neck: Normal range of motion. Neck supple.  Cardiovascular:  Tachycardic rate. No murmur, no gallop and no friction rub heard. "   Pulmonary/Chest: No respiratory distress. Effort normal. No wheezes, no rales, no rhonchi.   Abdominal: Bowel sounds normal. Soft. No distension and no mass. There is no tenderness. There is no rebound, no guarding, no tenderness at McBurney's point.  Neurological: GCS 15. Alert and oriented to person, place, and time. No gross cranial nerve, light touch or strength deficit. Coordination normal.   Skin: Skin is warm and dry.   EXT: 2+ radial pulses.   Psychiatric: Behavior is normal. Judgment normal. Calm, relaxed affect.     Medical Decision Making / Independent Interpretations / External Records Reviewed:      ED Course as of 11/06/23 2318 Mon Nov 06, 2023 2113 --  EKG Interpretation: I independently reviewed and interpreted EKG which shows sinus tachycardia at 127 beats per minute, no STEMI, no ischemic changes, normal intervals.   No acute change compared to prior tracing.  --   [RC]   0380 Patient is a 28-year-old male with history of substance use disorder, currently in residential rehabilitation, discharged yesterday after hypotension with unclear etiology, history of endocarditis, history of recent empyema who presents for evaluation of potential endocarditis.  On my interview patient denies any complaints.  He is noted to be tachycardic.   Physical exam is otherwise nonfocal including no murmur.  Initial differential included endocarditis, sepsis, occult infection.  I doubt acute intoxication or withdrawal.   [RC]   7364 I independently reviewed and interpreted labs which are notable for normal CBC, stable mild transaminitis.   I independently reviewed and interpreted CXR which shows no pneumothorax, no focal consolidation, no cardiomegaly, no acute process.    Patient has no signs of acute infection.    I note that patient has recent diagnosis of endocarditis status post what appeared to be appropriate treatment. No echo has been performed since.   Will place into ED observation for echocardiogram,  infectious disease consult.   Pt will be placed in ED obs unit. Pt discussed with ED observation unit non-physician provider.   Patient and his father agree with plan. [RC]      ED Course User Index  [RC] Sukhdev Gilbert MD       I decided to obtain the patient's medical records. I reviewed patient's prior external notes / results: inpatient admission documentation and referring facility documentation.     Additional Medical Decision Making: Hospitalization or escalation of care considered    Medications   sodium chloride 0.9% bolus 1,000 mL 1,000 mL (1,000 mLs Intravenous New Bag 11/6/23 9151)   sodium chloride 0.9% flush 10 mL (has no administration in time range)   melatonin tablet 6 mg (has no administration in time range)              Diagnostic Impression:    1. Chest pain    2. Tachycardia         ED Disposition Condition    Observation Good          No future appointments.        ---  I, Freddy Zaidi, scribed for, and in the presence of, Dr. Gilbert. I performed the scribed service and the documentation accurately describes the services I performed. I attest to the accuracy of the note.     Physician Attestation for Scribe:   I, Sukhdev Gilbert MD, reviewed documentation as scribed in my presence, which is both accurate and complete.      Sukhdev Gilbert MD  11/06/23 5009

## 2023-11-07 NOTE — ED TRIAGE NOTES
Patient presents to ED from [facility] for IV antibiotics for possible endocarditis. Patient reports Hx of IV heroin use, states he is at [facility] for detox. Patient denies complaints at this time. Pt AAO x4.

## 2023-11-07 NOTE — HPI
Mr Esteban is a 28M with history of IVDU, endocarditis (AMAd without completing treatment), hep c, hydropneumothorax who presents from [facility] because of endocarditis. Per patient he has felt fine, but [facility] was concerned he needed IV antibiotics for endocarditis. Per chart review he was diagnosed with endocarditis and admitted into MICU for septic shock in 09/2023. He AMAd during that hospital stay. Yesterday he was discharged for hypotension, Mr. Rosario states that [facility] accidentally gave him someone else's blood pressure medications. He denies chest pain, palpitations, fevers. Last drug use over 2 weeks ago. Admitted to EDOU initially, tachycardic  max on admission, afebrile without leucocytosis. TTE revealed vegetation, so patient was upgraded to hospital medicine

## 2023-11-07 NOTE — ED NOTES
Pt rounding complete.  Pain 0/10. Respirations even and unlabored. Aaox4. Restroom and comfort needs addressed.  Pt updated on plan of care.  Call light within reach.  Will continue to monitor.

## 2023-11-07 NOTE — PROGRESS NOTES
[facility] ED Observation Unit  Progress Note      HPI   Mr Esteban is a 28 year old male with a PMH that includes heroine dependence, iv drug abuse, endocarditis, hydropneumothorax, and hepatitis c. patient was discharged this morning around 10:00 a.m..  He was hospitalized for lightheadedness which was believed to be due to incorrect medication at [facility].  Patient was stable and asymptomatic at time of discharge.  He states that he went back to the [facility] but was sent back to the hospital because they thought that he needed to be on IV antibiotics for endocarditis.  Patient did not have an echo while in the hospital.  He needs to be medically cleared to return to [facility], specifically needs to know if he needs to be on antibiotics.  He denies fever, chills, chest pain, shortness of breath, nausea, vomiting and abdominal pain.     ED Course:  Exam is grossly unremarkable aside from a murmur.  Lab work notable for mild stable anemia and elevated liver enzymes.  No leukocytosis, normal lactic.  Troponin WNL.  Blood cultures in process.  Tachycardia resolved with IVF. No acute process on CXR.  Antibiotics were not prescribed at discharge this morning however patient unclear if he needs to be on antibiotics and [facility] will not allow him to return without a definitive answer.  While he was inpatient, he did not receive an echo to effectively rule out endocarditis.  Placed in EDOU for echo and for evaluation by Infectious Disease to see if antibiotics are recommended or not prior to returning to [facility].     Interval History   Patient remained afebrile throughout his stay in the EDOU.  Labs grossly unremarkable.  However echo revealed vegetation on his tricuspid valve.  Per chart review patient never completed his 6 weeks of IV antibiotics.  He will be upgraded to Hospital Medicine for re-initiation of his IV antibiotics.  Likely will be discharged home to [facility] who can  coordinate transportation to clinic for infusions.    PMHx   Past Medical History:   Diagnosis Date    IVDU (intravenous drug user)       No past surgical history on file.     Family Hx   No family history on file.     Social Hx   Social History     Socioeconomic History    Marital status: Single   Tobacco Use    Smoking status: Every Day     Types: Cigarettes   Substance and Sexual Activity    Alcohol use: Not Currently    Drug use: Yes     Types: Heroin    Sexual activity: Never        Vital Signs   Vitals:    11/07/23 1554 11/07/23 1556 11/07/23 1757 11/07/23 1843   BP: 117/66   129/74   BP Location: Right arm   Right arm   Patient Position: Lying   Lying   Pulse: 96 96 103 94   Resp: 18   16   Temp: 97.5 °F (36.4 °C)   98.1 °F (36.7 °C)   TempSrc: Oral   Oral   SpO2: 98%   99%   Weight:       Height:            Review of Systems  Review of Systems   Constitutional:  Negative for chills and fever.   Respiratory:  Negative for cough and shortness of breath.    Cardiovascular:  Negative for chest pain and palpitations.   Gastrointestinal:  Negative for abdominal pain, nausea and vomiting.   Neurological:  Negative for dizziness and headaches.   All other systems reviewed and are negative.      Brief Physical Exam/Reassessment   Physical Exam    Nursing note and vitals reviewed.  Constitutional: Vital signs are normal. He appears well-developed and well-nourished. He does not appear ill. No distress.   Cardiovascular:  Normal rate, regular rhythm, S1 normal and S2 normal.           Murmur heard.  Pulmonary/Chest: Effort normal and breath sounds normal. No tachypnea. He has no decreased breath sounds. He has no wheezes.   Abdominal: Abdomen is soft and flat. There is no abdominal tenderness.     Neurological: He is alert and oriented to person, place, and time. GCS eye subscore is 4. GCS verbal subscore is 5. GCS motor subscore is 6.   Skin: Skin is warm, dry and intact.         Labs/Imaging   Labs Reviewed   CBC W/  AUTO DIFFERENTIAL - Abnormal; Notable for the following components:       Result Value    RBC 4.30 (*)     Hemoglobin 11.4 (*)     Hematocrit 35.6 (*)     MCH 26.5 (*)     RDW 16.1 (*)     All other components within normal limits   COMPREHENSIVE METABOLIC PANEL - Abnormal; Notable for the following components:    Alkaline Phosphatase 166 (*)      (*)      (*)     All other components within normal limits   URINALYSIS, REFLEX TO URINE CULTURE - Abnormal; Notable for the following components:    Color, UA Colorless (*)     All other components within normal limits    Narrative:     Specimen Source->Urine   CBC W/ AUTO DIFFERENTIAL - Abnormal; Notable for the following components:    RBC 3.96 (*)     Hemoglobin 10.4 (*)     Hematocrit 32.8 (*)     MCH 26.3 (*)     MCHC 31.7 (*)     RDW 16.0 (*)     All other components within normal limits   COMPREHENSIVE METABOLIC PANEL - Abnormal; Notable for the following components:    Albumin 3.0 (*)     Alkaline Phosphatase 136 (*)     AST 92 (*)      (*)     All other components within normal limits   CULTURE, BLOOD    Narrative:     Aerobic and anaerobic   CULTURE, BLOOD    Narrative:     Aerobic and anaerobic   LACTIC ACID, PLASMA   TROPONIN I   TROPONIN I   TROPONIN I      Imaging Results              X-Ray Chest AP Portable (Final result)  Result time 11/06/23 22:05:36      Final result by Tim Singh MD (11/06/23 22:05:36)                   Impression:      As above.      Electronically signed by: Tim Singh MD  Date:    11/06/2023  Time:    22:05               Narrative:    EXAMINATION:  XR CHEST AP PORTABLE    CLINICAL HISTORY:  Sepsis;    TECHNIQUE:  Single frontal view of the chest was performed.    COMPARISON:  Multiple previous radiographs last dated 11/05/2023.    FINDINGS:  Cardiac silhouette is normal in size.  Lungs are symmetrically expanded.  Mild bibasilar opacities are seen with questionable small right pleural effusion.   Otherwise no new confluent focal consolidation seen, noting appearance on previous radiographs.  No pneumothorax.  No acute osseous abnormality identified.                                       I reviewed all labs, imaging, EKGs.     Plan   1. History of intravenous drug use in remission    2. Tachycardia    3. History of endocarditis    4. Chest pain    5. Endocarditis        I have discussed this case with jae Mariscal, [place].

## 2023-11-07 NOTE — H&P
[facility] ED Observation Unit  History and Physical      I assumed care of this patient from the Emergency Department at onset of observation time, 11:10 PM on 11/06/2023.       History of Present Illness:  Mr Esteban is a 28 year old male with a PMH that includes heroine dependence, iv drug abuse, endocarditis, hydropneumothorax, and hepatitis c. patient was discharged this morning around 10:00 a.m..  He was hospitalized for lightheadedness which was believed to be due to incorrect medication at [facility].  Patient was stable and asymptomatic at time of discharge.  He states that he went back to the [facility] but was sent back to the hospital because they thought that he needed to be on IV antibiotics for endocarditis.  Patient did not have an echo while in the hospital.  He needs to be medically cleared to return to [facility], specifically needs to know if he needs to be on antibiotics.  He denies fever, chills, chest pain, shortness of breath, nausea, vomiting and abdominal pain.    ED Course:  Exam is grossly unremarkable aside from a murmur.  Lab work notable for mild stable anemia and elevated liver enzymes.  No leukocytosis, normal lactic.  Troponin WNL.  Blood cultures in process.  Tachycardia resolved with IVF. No acute process on CXR.  Antibiotics were not prescribed at discharge this morning however patient unclear if he needs to be on antibiotics and [facility] will not allow him to return without a definitive answer.  While he was inpatient, he did not receive an echo to effectively rule out endocarditis.  Placed in EDOU for echo and for evaluation by Infectious Disease to see if antibiotics are recommended or not prior to returning to [facility].    I reviewed the ED Provider Note dated 11/06/2023 prior to my evaluation of this patient.  I reviewed all labs and imaging performed in the Main ED, prior to patient being placed in Observation. Patient was placed in the ED Observation  Unit for echo and infectious disease consult to rule out endocarditis/need for antibiotics    PMHx   Past Medical History:   Diagnosis Date    IVDU (intravenous drug user)       No past surgical history on file.     Family Hx   No family history on file.     Social Hx   Social History     Socioeconomic History    Marital status: Single   Tobacco Use    Smoking status: Every Day     Types: Cigarettes   Substance and Sexual Activity    Alcohol use: Not Currently    Drug use: Yes     Types: Heroin    Sexual activity: Never        Vital Signs   Vitals:    11/06/23 1914 11/06/23 2142 11/06/23 2314   BP: 126/76 125/76 124/70   Pulse: (!) 128 109 103   Resp: 18 18 18   Temp: 99.1 °F (37.3 °C) 97.9 °F (36.6 °C) 97.9 °F (36.6 °C)   TempSrc: Oral Oral Oral   SpO2: 97% 97% 98%   Weight: 74.4 kg (164 lb)         Review of Systems  Review of Systems   Constitutional:  Negative for chills, fever and malaise/fatigue.   Respiratory:  Negative for cough and shortness of breath.    Cardiovascular:  Negative for chest pain and palpitations.   Gastrointestinal:  Negative for abdominal pain, nausea and vomiting.   Genitourinary:  Negative for dysuria.   Musculoskeletal:  Negative for back pain and myalgias.   Skin:  Negative for rash.   Neurological:  Negative for dizziness and headaches.   Psychiatric/Behavioral:  Negative for depression and suicidal ideas.        Brief Physical Exam/Reassessment   Physical Exam    Constitutional: He appears well-developed and well-nourished. He is cooperative.  Non-toxic appearance. No distress.   HENT:   Head: Normocephalic and atraumatic.   Poor dentition   Eyes: EOM are normal. Pupils are equal, round, and reactive to light. No scleral icterus.   Neck:   Normal range of motion.  Cardiovascular:  Normal rate and regular rhythm.           Murmur heard.  Pulmonary/Chest: Breath sounds normal. No respiratory distress. He exhibits no tenderness.   Abdominal: Abdomen is soft. Bowel sounds are normal. He  exhibits no distension. There is no abdominal tenderness.   Musculoskeletal:         General: No tenderness. Normal range of motion.      Cervical back: Normal range of motion.     Neurological: He is alert and oriented to person, place, and time. He has normal strength. No sensory deficit. GCS score is 15. GCS eye subscore is 4. GCS verbal subscore is 5. GCS motor subscore is 6.   Skin: Skin is warm and dry. Capillary refill takes less than 2 seconds. No erythema.   Psychiatric: He has a normal mood and affect. Thought content normal.         Labs Reviewed   CBC W/ AUTO DIFFERENTIAL - Abnormal; Notable for the following components:       Result Value    RBC 4.30 (*)     Hemoglobin 11.4 (*)     Hematocrit 35.6 (*)     MCH 26.5 (*)     RDW 16.1 (*)     All other components within normal limits   COMPREHENSIVE METABOLIC PANEL - Abnormal; Notable for the following components:    Alkaline Phosphatase 166 (*)      (*)      (*)     All other components within normal limits   CULTURE, BLOOD   CULTURE, BLOOD   LACTIC ACID, PLASMA   TROPONIN I   URINALYSIS, REFLEX TO URINE CULTURE     X-Ray Chest AP Portable   Final Result      As above.         Electronically signed by: Tim Singh MD   Date:    11/06/2023   Time:    22:05            Medications:   Scheduled Meds:   [START ON 11/7/2023] buprenorphine-naloxone 8-2  8 mg Sublingual BID    mirtazapine  15 mg Oral Nightly    sodium chloride 0.9%  1,000 mL Intravenous ED 1 Time     Continuous Infusions:  PRN Meds:.[START ON 11/7/2023] acetaminophen, [START ON 11/7/2023] diphenhydrAMINE, [START ON 11/7/2023] ketorolac, [START ON 11/7/2023] melatonin, [START ON 11/7/2023] ondansetron, [START ON 11/7/2023] promethazine (PHENERGAN) 12.5 mg in dextrose 5 % (D5W) 50 mL IVPB, [START ON 11/7/2023] sodium chloride 0.9%      Assessment/Plan:    1. History of intravenous drug use in remission    2. Tachycardia    3. History of endocarditis    -echo  -consult to  ID  -continue Suboxone b.i.d.    Case was discussed with the ED provider, Dr. Gilbert

## 2023-11-07 NOTE — SUBJECTIVE & OBJECTIVE
Past Medical History:   Diagnosis Date    IVDU (intravenous drug user)        No past surgical history on file.    Review of patient's allergies indicates:  No Known Allergies    Current Facility-Administered Medications on File Prior to Encounter   Medication    [DISCONTINUED] 0.9%  NaCl infusion    [DISCONTINUED] buprenorphine-naloxone 2-0.5 mg SL tablet 8 mg    [DISCONTINUED] melatonin tablet 6 mg    [DISCONTINUED] nicotine 14 mg/24 hr 1 patch    [DISCONTINUED] ondansetron injection 4 mg    [DISCONTINUED] sodium chloride 0.9% bolus 1,000 mL 1,000 mL    [DISCONTINUED] sodium chloride 0.9% bolus 1,000 mL 1,000 mL     Current Outpatient Medications on File Prior to Encounter   Medication Sig    ferrous gluconate (FERGON) 324 MG tablet Take 324 mg by mouth with breakfast.    folic acid (FOLVITE) 1 MG tablet Take 1 mg by mouth once daily.    buprenorphine-naloxone 8-2 mg (SUBOXONE) 8-2 mg Place 8 mg under the tongue 2 (two) times a day.    mirtazapine (REMERON) 15 MG tablet Take 15 mg by mouth every evening.     Family History    None       Tobacco Use    Smoking status: Every Day     Types: Cigarettes    Smokeless tobacco: Not on file   Substance and Sexual Activity    Alcohol use: Not Currently    Drug use: Yes     Types: Heroin    Sexual activity: Never     Review of Systems   Constitutional:  Negative for fatigue and fever.   Respiratory:  Negative for shortness of breath.    Cardiovascular:  Negative for chest pain and palpitations.   Neurological:  Negative for headaches.   Psychiatric/Behavioral:  Negative for agitation and confusion.      Objective:     Vital Signs (Most Recent):  Temp: 97.9 °F (36.6 °C) (11/07/23 1223)  Pulse: 98 (11/07/23 1357)  Resp: 19 (11/07/23 1223)  BP: 130/67 (11/07/23 1223)  SpO2: 98 % (11/07/23 1223) Vital Signs (24h Range):  Temp:  [97.6 °F (36.4 °C)-99.1 °F (37.3 °C)] 97.9 °F (36.6 °C)  Pulse:  [] 98  Resp:  [16-19] 19  SpO2:  [96 %-98 %] 98 %  BP: (115-130)/(62-76) 130/67      Weight: 74.4 kg (164 lb)  Body mass index is 22.24 kg/m².     Physical Exam  Constitutional:       General: He is not in acute distress.     Appearance: He is not toxic-appearing.   Eyes:      Extraocular Movements: Extraocular movements intact.   Cardiovascular:      Rate and Rhythm: Regular rhythm. Tachycardia present.      Heart sounds: No murmur heard.  Pulmonary:      Effort: Pulmonary effort is normal. No respiratory distress.   Skin:     General: Skin is warm and dry.   Neurological:      General: No focal deficit present.      Mental Status: He is alert.   Psychiatric:         Mood and Affect: Mood normal.         Behavior: Behavior normal.                Significant Labs: All pertinent labs within the past 24 hours have been reviewed.    Significant Imaging: I have reviewed all pertinent imaging results/findings within the past 24 hours.

## 2023-11-07 NOTE — H&P
[facility] Emergency Dept (Observation)  [facility] Medicine  History & Physical    Patient Name: Carlito Rosario  MRN: [id number]  Patient Class: OP- Observation  Admission Date: 11/6/2023  Attending Physician: DIMITRIS Duvall MD   Primary Care Provider: Nereida, Primary Doctor         Patient information was obtained from patient, past medical records and ER records.     Subjective:     Principal Problem:Endocarditis of tricuspid valve    Chief Complaint:   Chief Complaint   Patient presents with    Weakness     Pt sent here today from [facility] for possible endocarditis after he was diagnosed with endocarditis & took IV antibiotics last month. Pt has no complaints. Denies drug use since admission.         HPI: Mr Esteban is a 28M with history of IVDU, endocarditis (AMAd without completing treatment), hep c, hydropneumothorax who presents from [facility] because of endocarditis. Per patient he has felt fine, but [facility] was concerned he needed IV antibiotics for endocarditis. Per chart review he was diagnosed with endocarditis and admitted into MICU for septic shock in 09/2023. He AMAd during that hospital stay. Yesterday he was discharged for hypotension, Mr. Rosario states that [facility] accidentally gave him someone else's blood pressure medications. He denies chest pain, palpitations, fevers. Last drug use over 2 weeks ago. Admitted to EDOU initially, tachycardic  max on admission, afebrile without leucocytosis. TTE revealed vegetation, so patient was upgraded to hospital medicine      Past Medical History:   Diagnosis Date    IVDU (intravenous drug user)        No past surgical history on file.    Review of patient's allergies indicates:  No Known Allergies    Current Facility-Administered Medications on File Prior to Encounter   Medication    [DISCONTINUED] 0.9%  NaCl infusion    [DISCONTINUED] buprenorphine-naloxone 2-0.5 mg SL tablet 8 mg    [DISCONTINUED] melatonin tablet 6 mg     [DISCONTINUED] nicotine 14 mg/24 hr 1 patch    [DISCONTINUED] ondansetron injection 4 mg    [DISCONTINUED] sodium chloride 0.9% bolus 1,000 mL 1,000 mL    [DISCONTINUED] sodium chloride 0.9% bolus 1,000 mL 1,000 mL     Current Outpatient Medications on File Prior to Encounter   Medication Sig    ferrous gluconate (FERGON) 324 MG tablet Take 324 mg by mouth with breakfast.    folic acid (FOLVITE) 1 MG tablet Take 1 mg by mouth once daily.    buprenorphine-naloxone 8-2 mg (SUBOXONE) 8-2 mg Place 8 mg under the tongue 2 (two) times a day.    mirtazapine (REMERON) 15 MG tablet Take 15 mg by mouth every evening.     Family History    None       Tobacco Use    Smoking status: Every Day     Types: Cigarettes    Smokeless tobacco: Not on file   Substance and Sexual Activity    Alcohol use: Not Currently    Drug use: Yes     Types: Heroin    Sexual activity: Never     Review of Systems   Constitutional:  Negative for fatigue and fever.   Respiratory:  Negative for shortness of breath.    Cardiovascular:  Negative for chest pain and palpitations.   Neurological:  Negative for headaches.   Psychiatric/Behavioral:  Negative for agitation and confusion.      Objective:     Vital Signs (Most Recent):  Temp: 97.9 °F (36.6 °C) (11/07/23 1223)  Pulse: 98 (11/07/23 1357)  Resp: 19 (11/07/23 1223)  BP: 130/67 (11/07/23 1223)  SpO2: 98 % (11/07/23 1223) Vital Signs (24h Range):  Temp:  [97.6 °F (36.4 °C)-99.1 °F (37.3 °C)] 97.9 °F (36.6 °C)  Pulse:  [] 98  Resp:  [16-19] 19  SpO2:  [96 %-98 %] 98 %  BP: (115-130)/(62-76) 130/67     Weight: 74.4 kg (164 lb)  Body mass index is 22.24 kg/m².     Physical Exam  Constitutional:       General: He is not in acute distress.     Appearance: He is not toxic-appearing.   Eyes:      Extraocular Movements: Extraocular movements intact.   Cardiovascular:      Rate and Rhythm: Regular rhythm. Tachycardia present.      Heart sounds: No murmur heard.  Pulmonary:      Effort: Pulmonary effort is  normal. No respiratory distress.   Skin:     General: Skin is warm and dry.   Neurological:      General: No focal deficit present.      Mental Status: He is alert.   Psychiatric:         Mood and Affect: Mood normal.         Behavior: Behavior normal.                Significant Labs: All pertinent labs within the past 24 hours have been reviewed.    Significant Imaging: I have reviewed all pertinent imaging results/findings within the past 24 hours.    Assessment/Plan:     * Endocarditis of tricuspid valve  Carlito Rosario is a 28M with history of IVDU currently at [facility] who was sent here by [facility] for endocarditis concerns (in 9/2023 he was diagnosed with endocarditis and had MRSA bacteremia but did not complete treatment)     Tachycardic  bpm, afebrile without leukocytosis   TTE shows medium mobile echogenic mass present on anterior leaflet of tricuspid valve  Bcx pending  Diagnosed with endocarditis and hospitalized in MICU in 09/2023 for septic shock,   Per ID notes in 09/2023: Daptomycin 580mg IV q24h and Ceftaroline 600mg IV q8H recommended for salvage; will continue for now  ID consulted  Opioid withdrawal PRNs        VTE Risk Mitigation (From admission, onward)           Ordered     IP VTE LOW RISK PATIENT  Once         11/06/23 2300                         Patient placed in observation under my care      Edyta Rg PA-C  Department of Hospital Medicine  [facility] - Emergency Dept (Observation)

## 2023-11-07 NOTE — FIRST PROVIDER EVALUATION
Emergency Department TeleTriage Encounter Note      CHIEF COMPLAINT    Chief Complaint   Patient presents with    Weakness     Pt sent here today from [facility] for possible endocarditis after he was diagnosed with endocarditis & took IV antibiotics last month. Pt has no complaints. Denies drug use since admission.        VITAL SIGNS   Initial Vitals [11/06/23 1914]   BP Pulse Resp Temp SpO2   126/76 (!) 128 18 99.1 °F (37.3 °C) 97 %      MAP       --            ALLERGIES    Review of patient's allergies indicates:  No Known Allergies    PROVIDER TRIAGE NOTE  Verbal consent for the teletriage evaluation was given by the patient at the start of the evaluation.  All efforts will be made to maintain patient's privacy during the evaluation.      This is a teletriage evaluation of a 28 y.o. male presenting to the ED with c/o endocarditis.  Was recently hospitalized for endocarditis and was supposed to have 6 weeks of IV ABX but only had 2 weeks.  Sent here for IV ABX.  No complaints at present. Limited physical exam via telehealth: The patient is awake, alert, answering questions appropriately and is not in respiratory distress.  As the Teletriage provider, I performed an initial assessment and ordered appropriate labs and imaging studies, if any, to facilitate the patient's care once placed in the ED. Once a room is available, care and a full evaluation will be completed by an alternate ED provider.  Any additional orders and the final disposition will be determined by that provider.  All imaging and labs will not be followed-up by the Teletriage Team, including myself.          ORDERS  Labs Reviewed - No data to display    ED Orders (720h ago, onward)      Start Ordered     Status Ordering Provider    11/06/23 1952 11/06/23 1951  EKG 12-lead  Once         Ordered JESSICA LAZARO    11/06/23 1919 11/06/23 1918  EKG 12-lead  Once         Completed by SUNITA BURCIAGA on 11/6/2023 at  7:21 PM DEREK BEAL               Virtual Visit Note: The provider triage portion of this emergency department evaluation and documentation was performed via [employer], a HIPAA-compliant telemedicine application, in concert with a tele-presenter in the room. A face to face patient evaluation with one of my colleagues will occur once the patient is placed in an emergency department room.      DISCLAIMER: This note was prepared with "[employer] voice recognition transcription software. Garbled syntax, mangled pronouns, and other bizarre constructions may be attributed to that software system.

## 2023-11-07 NOTE — TELEPHONE ENCOUNTER
Rebecca Trotter DNP ordered that patient be scheduled for a hepatology consult visit for hep c.  Patient hep c quant positive and currently hospitalized.  He is possibly going to [facility] when discharged.  Appt with PA Scheuermann scheduled 12/8/23; appt reminder notice mailed to home address.

## 2023-11-07 NOTE — ASSESSMENT & PLAN NOTE
Carlito Rosario is a 28M with history of IVDU currently at [facility] who was sent here by [facility] for endocarditis concerns (in 9/2023 he was diagnosed with endocarditis and had MRSA bacteremia but did not complete treatment)     Tachycardic  bpm, afebrile without leukocytosis   TTE shows medium mobile echogenic mass present on anterior leaflet of tricuspid valve  Bcx pending  Diagnosed with endocarditis and hospitalized in MICU in 09/2023 for septic shock,   Per ID notes in 09/2023: Daptomycin 580mg IV q24h and Ceftaroline 600mg IV q8H recommended for salvage; will continue for now  ID consulted  Opioid withdrawal PRNs

## 2023-11-08 LAB
ALBUMIN SERPL BCP-MCNC: 3.2 G/DL (ref 3.5–5.2)
ALP SERPL-CCNC: 171 U/L (ref 55–135)
ALT SERPL W/O P-5'-P-CCNC: 150 U/L (ref 10–44)
ANION GAP SERPL CALC-SCNC: 11 MMOL/L (ref 8–16)
AST SERPL-CCNC: 87 U/L (ref 10–40)
BASOPHILS # BLD AUTO: 0.06 K/UL (ref 0–0.2)
BASOPHILS NFR BLD: 0.9 % (ref 0–1.9)
BILIRUB SERPL-MCNC: 0.5 MG/DL (ref 0.1–1)
BUN SERPL-MCNC: 7 MG/DL (ref 6–20)
CALCIUM SERPL-MCNC: 9.4 MG/DL (ref 8.7–10.5)
CHLORIDE SERPL-SCNC: 102 MMOL/L (ref 95–110)
CO2 SERPL-SCNC: 24 MMOL/L (ref 23–29)
CREAT SERPL-MCNC: 0.9 MG/DL (ref 0.5–1.4)
DIFFERENTIAL METHOD: ABNORMAL
EOSINOPHIL # BLD AUTO: 0.2 K/UL (ref 0–0.5)
EOSINOPHIL NFR BLD: 3.2 % (ref 0–8)
ERYTHROCYTE [DISTWIDTH] IN BLOOD BY AUTOMATED COUNT: 16.2 % (ref 11.5–14.5)
EST. GFR  (NO RACE VARIABLE): >60 ML/MIN/1.73 M^2
GLUCOSE SERPL-MCNC: 114 MG/DL (ref 70–110)
HCT VFR BLD AUTO: 38.1 % (ref 40–54)
HGB BLD-MCNC: 11.9 G/DL (ref 14–18)
IMM GRANULOCYTES # BLD AUTO: 0.09 K/UL (ref 0–0.04)
IMM GRANULOCYTES NFR BLD AUTO: 1.3 % (ref 0–0.5)
LYMPHOCYTES # BLD AUTO: 2.1 K/UL (ref 1–4.8)
LYMPHOCYTES NFR BLD: 31.6 % (ref 18–48)
MCH RBC QN AUTO: 26.6 PG (ref 27–31)
MCHC RBC AUTO-ENTMCNC: 31.2 G/DL (ref 32–36)
MCV RBC AUTO: 85 FL (ref 82–98)
MONOCYTES # BLD AUTO: 0.5 K/UL (ref 0.3–1)
MONOCYTES NFR BLD: 7.4 % (ref 4–15)
NEUTROPHILS # BLD AUTO: 3.8 K/UL (ref 1.8–7.7)
NEUTROPHILS NFR BLD: 55.6 % (ref 38–73)
NRBC BLD-RTO: 0 /100 WBC
PLATELET # BLD AUTO: 211 K/UL (ref 150–450)
PLATELET BLD QL SMEAR: ABNORMAL
PMV BLD AUTO: ABNORMAL FL (ref 9.2–12.9)
POTASSIUM SERPL-SCNC: 4.4 MMOL/L (ref 3.5–5.1)
PROT SERPL-MCNC: 7 G/DL (ref 6–8.4)
RBC # BLD AUTO: 4.48 M/UL (ref 4.6–6.2)
SODIUM SERPL-SCNC: 137 MMOL/L (ref 136–145)
WBC # BLD AUTO: 6.78 K/UL (ref 3.9–12.7)

## 2023-11-08 PROCEDURE — 25000003 PHARM REV CODE 250: Performed by: PHYSICIAN ASSISTANT

## 2023-11-08 PROCEDURE — 63600175 PHARM REV CODE 636 W HCPCS: Mod: JZ,JG | Performed by: PHYSICIAN ASSISTANT

## 2023-11-08 PROCEDURE — 63600175 PHARM REV CODE 636 W HCPCS: Performed by: NURSE PRACTITIONER

## 2023-11-08 PROCEDURE — 99223 PR INITIAL HOSPITAL CARE,LEVL III: ICD-10-PCS | Mod: ,,, | Performed by: INTERNAL MEDICINE

## 2023-11-08 PROCEDURE — 25000003 PHARM REV CODE 250: Performed by: INTERNAL MEDICINE

## 2023-11-08 PROCEDURE — 99223 1ST HOSP IP/OBS HIGH 75: CPT | Mod: ,,, | Performed by: INTERNAL MEDICINE

## 2023-11-08 PROCEDURE — 80053 COMPREHEN METABOLIC PANEL: CPT | Performed by: PHYSICIAN ASSISTANT

## 2023-11-08 PROCEDURE — 63600175 PHARM REV CODE 636 W HCPCS: Performed by: PHYSICIAN ASSISTANT

## 2023-11-08 PROCEDURE — 11000001 HC ACUTE MED/SURG PRIVATE ROOM

## 2023-11-08 PROCEDURE — 25000003 PHARM REV CODE 250: Performed by: NURSE PRACTITIONER

## 2023-11-08 PROCEDURE — 85025 COMPLETE CBC W/AUTO DIFF WBC: CPT | Performed by: PHYSICIAN ASSISTANT

## 2023-11-08 PROCEDURE — 94761 N-INVAS EAR/PLS OXIMETRY MLT: CPT

## 2023-11-08 PROCEDURE — 36415 COLL VENOUS BLD VENIPUNCTURE: CPT | Performed by: PHYSICIAN ASSISTANT

## 2023-11-08 RX ORDER — MUPIROCIN 20 MG/G
OINTMENT TOPICAL 2 TIMES DAILY
Status: DISCONTINUED | OUTPATIENT
Start: 2023-11-08 | End: 2023-11-09 | Stop reason: HOSPADM

## 2023-11-08 RX ADMIN — DAPTOMYCIN 595 MG: 350 INJECTION, POWDER, LYOPHILIZED, FOR SOLUTION INTRAVENOUS at 02:11

## 2023-11-08 RX ADMIN — MUPIROCIN: 20 OINTMENT TOPICAL at 08:11

## 2023-11-08 RX ADMIN — CEFTAROLINE FOSAMIL 600 MG: 600 POWDER, FOR SOLUTION INTRAVENOUS at 07:11

## 2023-11-08 RX ADMIN — BUPRENORPHINE AND NALOXONE 8 MG: 8; 2 TABLET SUBLINGUAL at 08:11

## 2023-11-08 RX ADMIN — CEFTAROLINE FOSAMIL 600 MG: 600 POWDER, FOR SOLUTION INTRAVENOUS at 12:11

## 2023-11-08 RX ADMIN — SODIUM CHLORIDE: 9 INJECTION, SOLUTION INTRAVENOUS at 07:11

## 2023-11-08 RX ADMIN — CEFTAROLINE FOSAMIL 600 MG: 600 POWDER, FOR SOLUTION INTRAVENOUS at 06:11

## 2023-11-08 RX ADMIN — CEFTAROLINE FOSAMIL 600 MG: 600 POWDER, FOR SOLUTION INTRAVENOUS at 11:11

## 2023-11-08 RX ADMIN — MIRTAZAPINE 15 MG: 15 TABLET, FILM COATED ORAL at 08:11

## 2023-11-08 NOTE — PLAN OF CARE
Problem: Adult Inpatient Plan of Care  Goal: Plan of Care Review  Outcome: Ongoing, Progressing  Goal: Patient-Specific Goal (Individualized)  Outcome: Ongoing, Progressing  Goal: Absence of Hospital-Acquired Illness or Injury  Outcome: Ongoing, Progressing  Goal: Optimal Comfort and Wellbeing  Outcome: Ongoing, Progressing  Goal: Readiness for Transition of Care  Outcome: Ongoing, Progressing     Problem: Infection  Goal: Absence of Infection Signs and Symptoms  Outcome: Ongoing, Progressing     Pt remained free from falls or injuries this shift. Pt independent in repositioning. No skin breakdown noticed. Pt rested well through the night.  Afebrile. VSS

## 2023-11-08 NOTE — SUBJECTIVE & OBJECTIVE
Interval History: Bcx NGTD. Afebrile over night. Seen at bedside, pleasant and no complaints. Appreciate ID consult, will follow up recommendations    Review of Systems   Constitutional:  Negative for fatigue and fever.   Respiratory:  Negative for shortness of breath.    Cardiovascular:  Negative for chest pain and palpitations.   Neurological:  Negative for headaches.   Psychiatric/Behavioral:  Negative for agitation and confusion.      Objective:     Vital Signs (Most Recent):  Temp: 97.5 °F (36.4 °C) (11/08/23 0800)  Pulse: 98 (11/08/23 1000)  Resp: 18 (11/08/23 0800)  BP: 111/63 (11/08/23 0800)  SpO2: 97 % (11/08/23 0800) Vital Signs (24h Range):  Temp:  [97.5 °F (36.4 °C)-99.2 °F (37.3 °C)] 97.5 °F (36.4 °C)  Pulse:  [] 98  Resp:  [16-19] 18  SpO2:  [96 %-99 %] 97 %  BP: (106-137)/(52-81) 111/63     Weight: 75.7 kg (166 lb 14.2 oz)  Body mass index is 22.63 kg/m².    Intake/Output Summary (Last 24 hours) at 11/8/2023 1126  Last data filed at 11/8/2023 1037  Gross per 24 hour   Intake 2740.96 ml   Output 725 ml   Net 2015.96 ml         Physical Exam  Constitutional:       General: He is not in acute distress.     Appearance: He is not toxic-appearing.   Cardiovascular:      Rate and Rhythm: Tachycardia present.      Heart sounds: No murmur heard.  Pulmonary:      Effort: Pulmonary effort is normal. No respiratory distress.   Skin:     General: Skin is warm and dry.   Neurological:      General: No focal deficit present.      Mental Status: He is alert.   Psychiatric:         Mood and Affect: Mood normal.         Behavior: Behavior normal.             Significant Labs: All pertinent labs within the past 24 hours have been reviewed.    Significant Imaging: I have reviewed all pertinent imaging results/findings within the past 24 hours.

## 2023-11-08 NOTE — PLAN OF CARE
LMSW met with the patient at the bedside. Patient is alert and oriented with no communication barriers. Prior to admission, the patient is independent. Patient denies the use of HH or DME. Patient choice pharmacy is bedside. Patient wished to return to [facility] when he dc and stated they will pick him up.     [facility] - Med Surg ([address])  Initial Discharge Assessment       Primary Care Provider: No, Primary Doctor    Admission Diagnosis: Endocarditis [I38]  Tachycardia [R00.0]  History of intravenous drug use in remission [F19.91]  History of endocarditis [Z86.79]  Chest pain [R07.9]    Admission Date: 11/6/2023  Expected Discharge Date:     Transition of Care Barriers: (P) Substance Abuse    Payor: MEDICAID / Plan: [employer] / Product Type: Managed Medicaid /     Extended Emergency Contact Information  Primary Emergency Contact: Michelle Montez  Mobile Phone: [phone]  Relation: Mother  Preferred language: English   needed? No    Discharge Plan A: (P) Rehab       No Pharmacies Listed    Initial Assessment (most recent)       Adult Discharge Assessment - 11/08/23 0908          Discharge Assessment    Assessment Type Discharge Planning Assessment (P)      Confirmed/corrected address, phone number and insurance Yes (P)      Confirmed Demographics Correct on Facesheet (P)      Source of Information patient (P)      People in Home alone (P)      Do you expect to return to your current living situation? No (P)    Patient plans on returning to [facility]    Do you have help at home or someone to help you manage your care at home? Yes (P)      Prior to hospitilization cognitive status: Alert/Oriented;No Deficits (P)      Current cognitive status: Alert/Oriented;No Deficits (P)      Equipment Currently Used at Home none (P)      Readmission within 30 days? Yes (P)      Patient currently being followed by outpatient case management? No (P)      Do you currently have service(s) that  help you manage your care at home? No (P)      Do you take prescription medications? No (P)      Do you have prescription coverage? Yes (P)      Do you have any problems affording any of your prescribed medications? No (P)      Is the patient taking medications as prescribed? yes (P)      How do you get to doctors appointments? car, drives self;family or friend will provide (P)      Are you on dialysis? No (P)      Do you take coumadin? No (P)      DME Needed Upon Discharge  none (P)      Discharge Plan discussed with: Patient (P)      Transition of Care Barriers Substance Abuse (P)      Discharge Plan A Rehab (P)

## 2023-11-08 NOTE — PROGRESS NOTES
[facility] Surg ([address] Medicine  Progress Note    Patient Name: Carlito Rosario  MRN: [id number]  Patient Class: IP- Inpatient   Admission Date: 11/6/2023  Length of Stay: 1 days  Attending Physician: DIMITRIS Duvall MD  Primary Care Provider: Nereida, Primary Doctor        Subjective:     Principal Problem:Endocarditis of tricuspid valve        HPI:  Mr Esteban is a 28M with history of IVDU, endocarditis (AMAd without completing treatment), hep c, hydropneumothorax who presents from [facility] because of endocarditis. Per patient he has felt fine, but [facility] was concerned he needed IV antibiotics for endocarditis. Per chart review he was diagnosed with endocarditis and admitted into MICU for septic shock in 09/2023. He AMAd during that hospital stay. Yesterday he was discharged for hypotension, Mr. Rosario states that [facility] accidentally gave him someone else's blood pressure medications. He denies chest pain, palpitations, fevers. Last drug use over 2 weeks ago. Admitted to EDOU initially, tachycardic  max on admission, afebrile without leucocytosis. TTE revealed vegetation, so patient was upgraded to hospital medicine      Overview/Hospital Course:  Carlito Rosario was admitted for endocarditis evaluation. In September 2023 he was diagnosed with endocarditis, but did not complete treatment. Currently he is at [facility], TTE here re-demonstrates tricuspid vegetation but he has remained afebrile without leukocytosis, BCX NGTD. Currently on IV Daptomycin and Ceftaroline per prior ID recommendations during his September hospital stay. ID consulted for this admission, appreciate recommendations       Interval History: Bcx NGTD. Afebrile over night. Seen at bedside, pleasant and no complaints. Appreciate ID consult, will follow up recommendations    Review of Systems   Constitutional:  Negative for fatigue and fever.   Respiratory:  Negative for shortness of breath.     Cardiovascular:  Negative for chest pain and palpitations.   Neurological:  Negative for headaches.   Psychiatric/Behavioral:  Negative for agitation and confusion.      Objective:     Vital Signs (Most Recent):  Temp: 97.5 °F (36.4 °C) (11/08/23 0800)  Pulse: 98 (11/08/23 1000)  Resp: 18 (11/08/23 0800)  BP: 111/63 (11/08/23 0800)  SpO2: 97 % (11/08/23 0800) Vital Signs (24h Range):  Temp:  [97.5 °F (36.4 °C)-99.2 °F (37.3 °C)] 97.5 °F (36.4 °C)  Pulse:  [] 98  Resp:  [16-19] 18  SpO2:  [96 %-99 %] 97 %  BP: (106-137)/(52-81) 111/63     Weight: 75.7 kg (166 lb 14.2 oz)  Body mass index is 22.63 kg/m².    Intake/Output Summary (Last 24 hours) at 11/8/2023 1126  Last data filed at 11/8/2023 1037  Gross per 24 hour   Intake 2740.96 ml   Output 725 ml   Net 2015.96 ml         Physical Exam  Constitutional:       General: He is not in acute distress.     Appearance: He is not toxic-appearing.   Cardiovascular:      Rate and Rhythm: Tachycardia present.      Heart sounds: No murmur heard.  Pulmonary:      Effort: Pulmonary effort is normal. No respiratory distress.   Skin:     General: Skin is warm and dry.   Neurological:      General: No focal deficit present.      Mental Status: He is alert.   Psychiatric:         Mood and Affect: Mood normal.         Behavior: Behavior normal.             Significant Labs: All pertinent labs within the past 24 hours have been reviewed.    Significant Imaging: I have reviewed all pertinent imaging results/findings within the past 24 hours.      Assessment/Plan:      * Endocarditis of tricuspid valve  Carlito Rosario is a 28M with history of IVDU currently at [facility] who was sent here by [facility] for endocarditis concerns (in 9/2023 he was diagnosed with endocarditis and had MRSA bacteremia but did not complete treatment)     Tachycardic  bpm, afebrile without leukocytosis   TTE shows medium mobile echogenic mass present on anterior leaflet of tricuspid  valve  Bcx pending  Diagnosed with endocarditis and hospitalized in MICU in 09/2023 for septic shock,   Per ID notes in 09/2023: Daptomycin 580mg IV q24h and Ceftaroline 600mg IV q8H recommended for salvage; will continue for now  ID consulted, appreciate recommendations  Opioid withdrawal PRNs  Dispo to return to [facility] at discharge      VTE Risk Mitigation (From admission, onward)         Ordered     IP VTE LOW RISK PATIENT  Once         11/06/23 2308                Discharge Planning   DESEAN:      Code Status: Full Code   Is the patient medically ready for discharge?:     Reason for patient still in hospital (select all that apply): Patient trending condition  Discharge Plan A: Rehab                  Edyta Rg PA-C  Department of Hospital Medicine   [facility] - Med Surg ([address])

## 2023-11-08 NOTE — HPI
"  29 yo man with OUD and endovascular MRSA (TVE), admitted after leaving AMA from the hospital in October. "Pt sent from [facility] via EMS with lethargy and weakness. BP at [facility] 60/30. Pt appearing with pinpoint pupils."     ID is consulted for "medical clearance to return to [facility], rule out endocarditis or need for antibiotic therapy before they will accept him back."    Blood cultures were obtained and are pending. A TTE demonstrated a echodensity on his TV - old?    The patient denies any fever, rigors, or other symptoms of systemic infection.      From last ID note (09/18/2023)  "Sepsis from complicated MRSA bacteremia with TV native valve endocarditis in a young IVDU patient, off vasopressor support, OSH TTE noted "moderate" sized vegetation, evidence of septic emboli, pneumonia and moderate loculated left hydropneumothorax s/p chest tube placed      Was initially being considered for VATS at OSH, now s/p chest tube for R hydropneumothorax 9/15. General surgery following patient, advised 14Fr pigtail into that posterior/inferior pocket, no surgical plans at this time. Currently he does not seem to have any new areas of metastatic foci of infection on exam.     Course has been complicated by positive blood cultures since 9/14, subtherapeutic vancomycin levels, and high burden of bacteremia.     Recommendations:  - Continue salvage therapy with Daptomycin and Ceftaroline  - Repeat blood cultures q48 hours until microbiological clearance  - TTE performed, will follow up results  - Not an OPAT candidate given IVDU hx, will need a facility transfer upon discharge  - HIV negative, Hep C Ab+ , PCR in process, if positive will need outpatient hepatology referral to initiate therapy  - Discussed ID management with patient's family"      The patient was subsequently admitted to [place] ([facility]) and underwent decortication (Mean). The patient then received two weekly dosed of dalbavancin, " Report given to [facility] SHAVONNE JENNINGS  12/12/22 9684 lastly on 10/7 (ED here).

## 2023-11-08 NOTE — PLAN OF CARE
Pt transferred from [facility]. AAOx4. VSS on RA. Pt denies pain at this time. NS infusing at 100ml/hr.Room orientation given. Pt has call light in reach, side rails up X2, bed in low position and nonskid socks on. Pt lying in bed in no distress. Will continue to monitor.

## 2023-11-08 NOTE — SUBJECTIVE & OBJECTIVE
Past Medical History:   Diagnosis Date    IVDU (intravenous drug user)        No past surgical history on file.    Review of patient's allergies indicates:  No Known Allergies    Medications:  Medications Prior to Admission   Medication Sig    buprenorphine-naloxone 8-2 mg (SUBOXONE) 8-2 mg Place 8 mg under the tongue 2 (two) times a day.    ferrous gluconate (FERGON) 324 MG tablet Take 324 mg by mouth with breakfast.    folic acid (FOLVITE) 1 MG tablet Take 1 mg by mouth once daily.    mirtazapine (REMERON) 15 MG tablet Take 15 mg by mouth every evening.     Antibiotics (From admission, onward)      Start     Stop Route Frequency Ordered    11/08/23 1000  mupirocin 2 % ointment         11/13/23 0859 Nasl 2 times daily 11/08/23 0852    11/07/23 1545  ceftaroline fosamiL (TEFLARO) 600 mg in dextrose 5 % in water (D5W) 50 mL IVPB (MB+)         -- IV Every 8 hours (non-standard times) 11/07/23 1431    11/07/23 1500  DAPTOmycin (CUBICIN) 595 mg in sodium chloride 0.9% SolP 50 mL IVPB         -- IV Every 24 hours (non-standard times) 11/07/23 1403          Antifungals (From admission, onward)      None          Antivirals (From admission, onward)      None             Immunization History   Administered Date(s) Administered    Tdap 11/08/2018       Family History    None       Social History     Socioeconomic History    Marital status: Single   Tobacco Use    Smoking status: Every Day     Types: Cigarettes   Substance and Sexual Activity    Alcohol use: Not Currently    Drug use: Yes     Types: Heroin    Sexual activity: Never     Review of Systems   Constitutional:  Negative for chills, diaphoresis, fatigue, fever and unexpected weight change.   All other systems reviewed and are negative.    Objective:     Vital Signs (Most Recent):  Temp: 97.5 °F (36.4 °C) (11/08/23 0800)  Pulse: 98 (11/08/23 1000)  Resp: 18 (11/08/23 0800)  BP: 111/63 (11/08/23 0800)  SpO2: 97 % (11/08/23 0800) Vital Signs (24h Range):  Temp:  [97.5  °F (36.4 °C)-99.2 °F (37.3 °C)] 97.5 °F (36.4 °C)  Pulse:  [] 98  Resp:  [16-19] 18  SpO2:  [96 %-99 %] 97 %  BP: (106-137)/(52-81) 111/63     Weight: 75.7 kg (166 lb 14.2 oz)  Body mass index is 22.63 kg/m².    Estimated Creatinine Clearance: 130.8 mL/min (based on SCr of 0.9 mg/dL).     Physical Exam  Vitals and nursing note reviewed.   Constitutional:       Appearance: Normal appearance. He is not ill-appearing, toxic-appearing or diaphoretic.   HENT:      Head: Normocephalic and atraumatic.      Right Ear: External ear normal.      Left Ear: External ear normal.   Eyes:      Extraocular Movements: Extraocular movements intact.      Pupils: Pupils are equal, round, and reactive to light.   Cardiovascular:      Rate and Rhythm: Normal rate and regular rhythm.      Heart sounds: Murmur heard.   Pulmonary:      Breath sounds: No wheezing, rhonchi or rales.   Chest:      Chest wall: No tenderness.   Skin:     Findings: No erythema or rash.   Neurological:      General: No focal deficit present.      Mental Status: He is alert and oriented to person, place, and time. Mental status is at baseline.   Psychiatric:         Mood and Affect: Mood normal.         Behavior: Behavior normal.         Thought Content: Thought content normal.         Judgment: Judgment normal.          Significant Labs: Blood Culture:   Recent Labs   Lab 09/18/23  0947 09/18/23  1123 10/07/23  1207 11/06/23  2121 11/06/23 2122   LABBLOO Gram stain aer bottle: Gram positive cocci in clusters resembling Staph  Gram stain lee bottle: Gram positive cocci in clusters resembling Staph  Results called to and read back by: Norberto Cam  09/19/2023  13:13  METHICILLIN RESISTANT STAPHYLOCOCCUS AUREUS  ID consult required at [facility].  For susceptibility see order #[id number]  * Gram stain aer bottle: Gram positive cocci in clusters resembling Staph  Results called to and read back by: Norberto Cam  09/19/2023  " 13:11  METHICILLIN RESISTANT STAPHYLOCOCCUS AUREUS  ID consult required at [facility] [facility].  For susceptibility see order #[id number]  * No growth after 5 days.  No growth after 5 days. No Growth to date No Growth to date     CBC:   Recent Labs   Lab 11/06/23  2121 11/07/23  0455 11/08/23  0953   WBC 8.31 6.29 6.78   HGB 11.4* 10.4* 11.9*   HCT 35.6* 32.8* 38.1*    214 211     Respiratory Culture: No results for input(s): "GSRESP", "RESPIRATORYC" in the last 4320 hours.  Urine Culture: No results for input(s): "LABURIN" in the last 4320 hours.  Wound Culture: No results for input(s): "LABAERO" in the last 4320 hours.    Significant Imaging: I have reviewed all pertinent imaging results/findings within the past 24 hours.  "

## 2023-11-08 NOTE — CARE UPDATE
11/08/23 0800   PRE-TX-O2   Device (Oxygen Therapy) room air   SpO2 97 %   Pulse Oximetry Type Intermittent   $ Pulse Oximetry - Multiple Charge Pulse Oximetry - Multiple

## 2023-11-08 NOTE — PLAN OF CARE
Problem: Adult Inpatient Plan of Care  Goal: Plan of Care Review  Outcome: Ongoing, Not Progressing  Goal: Patient-Specific Goal (Individualized)  Outcome: Ongoing, Not Progressing  Goal: Absence of Hospital-Acquired Illness or Injury  Outcome: Ongoing, Not Progressing  Goal: Optimal Comfort and Wellbeing  Outcome: Ongoing, Not Progressing  Goal: Readiness for Transition of Care  Outcome: Ongoing, Not Progressing     Problem: Infection  Goal: Absence of Infection Signs and Symptoms  Outcome: Ongoing, Not Progressing     Problem: Adjustment to Illness (Sepsis/Septic Shock)  Goal: Optimal Coping  Outcome: Ongoing, Not Progressing     Problem: Bleeding (Sepsis/Septic Shock)  Goal: Absence of Bleeding  Outcome: Ongoing, Not Progressing     Problem: Glycemic Control Impaired (Sepsis/Septic Shock)  Goal: Blood Glucose Level Within Desired Range  Outcome: Ongoing, Not Progressing     Problem: Infection Progression (Sepsis/Septic Shock)  Goal: Absence of Infection Signs and Symptoms  Outcome: Ongoing, Not Progressing     Problem: Nutrition Impaired (Sepsis/Septic Shock)  Goal: Optimal Nutrition Intake  Outcome: Ongoing, Not Progressing

## 2023-11-08 NOTE — HOSPITAL COURSE
Carlito Rosario was admitted for endocarditis evaluation. In September 2023 he was diagnosed with endocarditis, but did not complete treatment. Currently he is at [facility], TTE here re-demonstrates tricuspid vegetation but he has remained afebrile without leukocytosis, BCX NGTD. Currently on IV Daptomycin and Ceftaroline per prior ID recommendations during his September hospital stay. ID consulted for this admission, appreciate recommendations:  Patient seems to have cleared his bacteremia despite only 3 weeks of treatment, his blood cultures here have remained negative, doubt retreatment with an additional course of antibiotics would help without active infection.  Okay to discontinue antibiotics    Stable for discharge with referral to ID.  Ketoconazole shampoo and hydroxyzine ordered for tinea corpis.  Plan to discharge to [facility], return precautions discussed no further questions at discharge

## 2023-11-08 NOTE — ASSESSMENT & PLAN NOTE
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

## 2023-11-08 NOTE — CONSULTS
"[facility] Surg ([address])  Infectious Disease  Consult Note    Patient Name: Carlito Rosario  MRN: [id number]  Admission Date: 11/6/2023  Hospital Length of Stay: 1 days  Attending Physician: DIMITRIS Duvall MD  Primary Care Provider: No, Primary Doctor     Isolation Status: No active isolations    Patient information was obtained from patient, past medical records and ER records.      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 I will follow-up with patient. Please contact us if you have any additional questions.    Tito Mendoza MD  Infectious Disease  [facility] Surg ([address])    Subjective:     Principal Problem: Endocarditis of tricuspid valve    HPI:   29 yo man with OUD and endovascular MRSA (TVE), admitted after leaving AMA from the hospital in October. "Pt sent from [facility] via EMS with lethargy and weakness. BP at [facility] 60/30. Pt appearing with pinpoint pupils."     ID is consulted for "medical clearance to return to [facility], rule out endocarditis or need for antibiotic therapy before they will accept him back."    Blood cultures were obtained and are pending. A TTE demonstrated a echodensity on his TV - old?    The patient denies any fever, rigors, or other " "symptoms of systemic infection.      From last ID note (09/18/2023)  "Sepsis from complicated MRSA bacteremia with TV native valve endocarditis in a young IVDU patient, off vasopressor support, OSH TTE noted "moderate" sized vegetation, evidence of septic emboli, pneumonia and moderate loculated left hydropneumothorax s/p chest tube placed      Was initially being considered for VATS at OSH, now s/p chest tube for R hydropneumothorax 9/15. General surgery following patient, advised 14Fr pigtail into that posterior/inferior pocket, no surgical plans at this time. Currently he does not seem to have any new areas of metastatic foci of infection on exam.     Course has been complicated by positive blood cultures since 9/14, subtherapeutic vancomycin levels, and high burden of bacteremia.     Recommendations:  - Continue salvage therapy with Daptomycin and Ceftaroline  - Repeat blood cultures q48 hours until microbiological clearance  - TTE performed, will follow up results  - Not an OPAT candidate given IVDU hx, will need a facility transfer upon discharge  - HIV negative, Hep C Ab+ , PCR in process, if positive will need outpatient hepatology referral to initiate therapy  - Discussed ID management with patient's family"      The patient was [facility] [place] [facility] and underwent decortication (Mean). The patient then received two weekly dosed of dalbavancin, lastly on 10/7 (ED here).      Past Medical History:   Diagnosis Date    IVDU (intravenous drug user)        No past surgical history on file.    Review of patient's allergies indicates:  No Known Allergies    Medications:  Medications Prior to Admission   Medication Sig    buprenorphine-naloxone 8-2 mg (SUBOXONE) 8-2 mg Place 8 mg under the tongue 2 (two) times a day.    ferrous gluconate (FERGON) 324 MG tablet Take 324 mg by mouth with breakfast.    folic acid (FOLVITE) 1 MG tablet Take 1 mg by mouth once daily.    mirtazapine " (REMERON) 15 MG tablet Take 15 mg by mouth every evening.     Antibiotics (From admission, onward)      Start     Stop Route Frequency Ordered    11/08/23 1000  mupirocin 2 % ointment         11/13/23 0859 Nasl 2 times daily 11/08/23 0852    11/07/23 1545  ceftaroline fosamiL (TEFLARO) 600 mg in dextrose 5 % in water (D5W) 50 mL IVPB (MB+)         -- IV Every 8 hours (non-standard times) 11/07/23 1431    11/07/23 1500  DAPTOmycin (CUBICIN) 595 mg in sodium chloride 0.9% SolP 50 mL IVPB         -- IV Every 24 hours (non-standard times) 11/07/23 1403          Antifungals (From admission, onward)      None          Antivirals (From admission, onward)      None             Immunization History   Administered Date(s) Administered    Tdap 11/08/2018       Family History    None       Social History     Socioeconomic History    Marital status: Single   Tobacco Use    Smoking status: Every Day     Types: Cigarettes   Substance and Sexual Activity    Alcohol use: Not Currently    Drug use: Yes     Types: Heroin    Sexual activity: Never     Review of Systems   Constitutional:  Negative for chills, diaphoresis, fatigue, fever and unexpected weight change.   All other systems reviewed and are negative.    Objective:     Vital Signs (Most Recent):  Temp: 97.5 °F (36.4 °C) (11/08/23 0800)  Pulse: 98 (11/08/23 1000)  Resp: 18 (11/08/23 0800)  BP: 111/63 (11/08/23 0800)  SpO2: 97 % (11/08/23 0800) Vital Signs (24h Range):  Temp:  [97.5 °F (36.4 °C)-99.2 °F (37.3 °C)] 97.5 °F (36.4 °C)  Pulse:  [] 98  Resp:  [16-19] 18  SpO2:  [96 %-99 %] 97 %  BP: (106-137)/(52-81) 111/63     Weight: 75.7 kg (166 lb 14.2 oz)  Body mass index is 22.63 kg/m².    Estimated Creatinine Clearance: 130.8 mL/min (based on SCr of 0.9 mg/dL).     Physical Exam  Vitals and nursing note reviewed.   Constitutional:       Appearance: Normal appearance. He is not ill-appearing, toxic-appearing or diaphoretic.   HENT:      Head: Normocephalic and  "atraumatic.      Right Ear: External ear normal.      Left Ear: External ear normal.   Eyes:      Extraocular Movements: Extraocular movements intact.      Pupils: Pupils are equal, round, and reactive to light.   Cardiovascular:      Rate and Rhythm: Normal rate and regular rhythm.      Heart sounds: Murmur heard.   Pulmonary:      Breath sounds: No wheezing, rhonchi or rales.   Chest:      Chest wall: No tenderness.   Skin:     Findings: No erythema or rash.   Neurological:      General: No focal deficit present.      Mental Status: He is alert and oriented to person, place, and time. Mental status is at baseline.   Psychiatric:         Mood and Affect: Mood normal.         Behavior: Behavior normal.         Thought Content: Thought content normal.         Judgment: Judgment normal.          Significant Labs: Blood Culture:   Recent Labs   Lab 09/18/23  0947 09/18/23  1123 10/07/23  1207 11/06/23 2121 11/06/23 2122   LABBLOO Gram stain aer bottle: Gram positive cocci in clusters resembling Staph  Gram stain lee bottle: Gram positive cocci in clusters resembling Staph  Results called to and read back by: Norberto Cam  09/19/2023  13:13  METHICILLIN RESISTANT STAPHYLOCOCCUS AUREUS  ID consult required at [facility].  For susceptibility see order #[id number]  * Gram stain aer bottle: Gram positive cocci in clusters resembling Staph  Results called to and read back by: Norberto Cam  09/19/2023  13:11  METHICILLIN RESISTANT STAPHYLOCOCCUS AUREUS  ID consult required at [facility].  For susceptibility see order #[id number]  * No growth after 5 days.  No growth after 5 days. No Growth to date No Growth to date     CBC:   Recent Labs   Lab 11/06/23  2121 11/07/23  0455 11/08/23  0953   WBC 8.31 6.29 6.78   HGB 11.4* 10.4* 11.9*   HCT 35.6* 32.8* 38.1*    214 211     Respiratory Culture: No results for input(s): "GSRESP", "RESPIRATORYC" in the " "last 4320 hours.  Urine Culture: No results for input(s): "LABURIN" in the last 4320 hours.  Wound Culture: No results for input(s): "LABAERO" in the last 4320 hours.    Significant Imaging: I have reviewed all pertinent imaging results/findings within the past 24 hours.              "

## 2023-11-09 VITALS
BODY MASS INDEX: 22.6 KG/M2 | RESPIRATION RATE: 18 BRPM | HEIGHT: 72 IN | SYSTOLIC BLOOD PRESSURE: 112 MMHG | TEMPERATURE: 98 F | HEART RATE: 100 BPM | WEIGHT: 166.88 LBS | OXYGEN SATURATION: 98 % | DIASTOLIC BLOOD PRESSURE: 63 MMHG

## 2023-11-09 LAB
ALBUMIN SERPL BCP-MCNC: 3.3 G/DL (ref 3.5–5.2)
ALP SERPL-CCNC: 166 U/L (ref 55–135)
ALT SERPL W/O P-5'-P-CCNC: 134 U/L (ref 10–44)
ANION GAP SERPL CALC-SCNC: 9 MMOL/L (ref 8–16)
AST SERPL-CCNC: 65 U/L (ref 10–40)
BASOPHILS # BLD AUTO: 0.05 K/UL (ref 0–0.2)
BASOPHILS NFR BLD: 0.7 % (ref 0–1.9)
BILIRUB SERPL-MCNC: 0.6 MG/DL (ref 0.1–1)
BUN SERPL-MCNC: 10 MG/DL (ref 6–20)
CALCIUM SERPL-MCNC: 9.5 MG/DL (ref 8.7–10.5)
CHLORIDE SERPL-SCNC: 104 MMOL/L (ref 95–110)
CO2 SERPL-SCNC: 23 MMOL/L (ref 23–29)
CREAT SERPL-MCNC: 1 MG/DL (ref 0.5–1.4)
DIFFERENTIAL METHOD: ABNORMAL
EOSINOPHIL # BLD AUTO: 0.3 K/UL (ref 0–0.5)
EOSINOPHIL NFR BLD: 4.6 % (ref 0–8)
ERYTHROCYTE [DISTWIDTH] IN BLOOD BY AUTOMATED COUNT: 16.1 % (ref 11.5–14.5)
EST. GFR  (NO RACE VARIABLE): >60 ML/MIN/1.73 M^2
GLUCOSE SERPL-MCNC: 125 MG/DL (ref 70–110)
HCT VFR BLD AUTO: 39.5 % (ref 40–54)
HGB BLD-MCNC: 12.8 G/DL (ref 14–18)
IMM GRANULOCYTES # BLD AUTO: 0.01 K/UL (ref 0–0.04)
IMM GRANULOCYTES NFR BLD AUTO: 0.1 % (ref 0–0.5)
LYMPHOCYTES # BLD AUTO: 2.6 K/UL (ref 1–4.8)
LYMPHOCYTES NFR BLD: 35.7 % (ref 18–48)
MCH RBC QN AUTO: 26.4 PG (ref 27–31)
MCHC RBC AUTO-ENTMCNC: 32.4 G/DL (ref 32–36)
MCV RBC AUTO: 82 FL (ref 82–98)
MONOCYTES # BLD AUTO: 0.7 K/UL (ref 0.3–1)
MONOCYTES NFR BLD: 9.4 % (ref 4–15)
NEUTROPHILS # BLD AUTO: 3.6 K/UL (ref 1.8–7.7)
NEUTROPHILS NFR BLD: 49.5 % (ref 38–73)
NRBC BLD-RTO: 0 /100 WBC
PLATELET # BLD AUTO: 258 K/UL (ref 150–450)
PMV BLD AUTO: 9.8 FL (ref 9.2–12.9)
POTASSIUM SERPL-SCNC: 3.9 MMOL/L (ref 3.5–5.1)
PROT SERPL-MCNC: 7.1 G/DL (ref 6–8.4)
RBC # BLD AUTO: 4.84 M/UL (ref 4.6–6.2)
SODIUM SERPL-SCNC: 136 MMOL/L (ref 136–145)
WBC # BLD AUTO: 7.23 K/UL (ref 3.9–12.7)

## 2023-11-09 PROCEDURE — 63600175 PHARM REV CODE 636 W HCPCS: Performed by: NURSE PRACTITIONER

## 2023-11-09 PROCEDURE — 85025 COMPLETE CBC W/AUTO DIFF WBC: CPT | Performed by: PHYSICIAN ASSISTANT

## 2023-11-09 PROCEDURE — 63600175 PHARM REV CODE 636 W HCPCS: Mod: JZ,JG | Performed by: PHYSICIAN ASSISTANT

## 2023-11-09 PROCEDURE — 25000003 PHARM REV CODE 250: Performed by: PHYSICIAN ASSISTANT

## 2023-11-09 PROCEDURE — 36415 COLL VENOUS BLD VENIPUNCTURE: CPT | Performed by: PHYSICIAN ASSISTANT

## 2023-11-09 PROCEDURE — 80053 COMPREHEN METABOLIC PANEL: CPT | Performed by: PHYSICIAN ASSISTANT

## 2023-11-09 RX ORDER — HYDROXYZINE HYDROCHLORIDE 25 MG/1
25 TABLET, FILM COATED ORAL 3 TIMES DAILY PRN
Qty: 30 TABLET | Refills: 0 | Status: SHIPPED | OUTPATIENT
Start: 2023-11-09

## 2023-11-09 RX ORDER — KETOCONAZOLE 20 MG/ML
SHAMPOO, SUSPENSION TOPICAL DAILY
Qty: 120 ML | Refills: 0 | Status: SHIPPED | OUTPATIENT
Start: 2023-11-09 | End: 2023-11-23

## 2023-11-09 RX ADMIN — BUPRENORPHINE AND NALOXONE 8 MG: 8; 2 TABLET SUBLINGUAL at 09:11

## 2023-11-09 RX ADMIN — CEFTAROLINE FOSAMIL 600 MG: 600 POWDER, FOR SOLUTION INTRAVENOUS at 09:11

## 2023-11-09 NOTE — ASSESSMENT & PLAN NOTE
Carlito Rosario is a 28M with history of IVDU currently at [facility] who was sent here by [facility] for endocarditis concerns (in 9/2023 he was diagnosed with endocarditis and had MRSA bacteremia but did not complete treatment)     Tachycardic  bpm, afebrile without leukocytosis   TTE shows medium mobile echogenic mass present on anterior leaflet of tricuspid valve  Bcx pending  Diagnosed with endocarditis and hospitalized in MICU in 09/2023 for septic shock,   Per ID notes in 09/2023: Daptomycin 580mg IV q24h and Ceftaroline 600mg IV q8H recommended for salvage; will continue for now  ID consulted, appreciate recommendations:    - now asymptomatic, off abx x 3 weeks  - blood cultures are sterile, thus far  - started on dapto and ceftaroline  - patient seem to have clear his bacteremia despite only three weeks of treatment  - doubt re-treatment with an additional course of abx would help without active infection   - therefore, would stop abx if surveillance blood cultures are sterile  Blood cultures no growth to date x3 days, stable for discharge back to [facility] without antibiotics.  Referral to ID at discharge

## 2023-11-09 NOTE — PLAN OF CARE
Problem: Adult Inpatient Plan of Care  Goal: Plan of Care Review  Outcome: Ongoing, Progressing  Goal: Optimal Comfort and Wellbeing  Outcome: Ongoing, Progressing  Goal: Readiness for Transition of Care  Outcome: Ongoing, Progressing     Problem: Infection  Goal: Absence of Infection Signs and Symptoms  Outcome: Ongoing, Progressing     Problem: Adjustment to Illness (Sepsis/Septic Shock)  Goal: Optimal Coping  Outcome: Ongoing, Progressing     Problem: Bleeding (Sepsis/Septic Shock)  Goal: Absence of Bleeding  Outcome: Ongoing, Progressing

## 2023-11-09 NOTE — DISCHARGE SUMMARY
[facility] ([address] Medicine  Discharge Summary      Patient Name: Carlito Rosario  MRN: [id number]  KRISTEN: [id number]  Patient Class: IP- Inpatient  Admission Date: 11/6/2023  Hospital Length of Stay: 2 days  Discharge Date and Time: No discharge date for patient encounter.  Attending Physician: DIMITRIS Duvall MD   Discharging Provider: Edyta Rg PA-C  Primary Care Provider: Nereida, Primary Doctor    Primary Care Team: Networked reference to record PCT     HPI:   Mr Esteban is a 28M with history of IVDU, endocarditis (AMAd without completing treatment), hep c, hydropneumothorax who presents from [facility] because of endocarditis. Per patient he has felt fine, but [facility] was concerned he needed IV antibiotics for endocarditis. Per chart review he was diagnosed with endocarditis and admitted into MICU for septic shock in 09/2023. He AMAd during that hospital stay. Yesterday he was discharged for hypotension, Mr. Rosario states that [facility] accidentally gave him someone else's blood pressure medications. He denies chest pain, palpitations, fevers. Last drug use over 2 weeks ago. Admitted to [facility] initially, tachycardic  max on admission, afebrile without leucocytosis. TTE revealed vegetation, so patient was upgraded to hospital medicine      * No surgery found *      Hospital Course:   Carlito Rosario was admitted for endocarditis evaluation. In September 2023 he was diagnosed with endocarditis, but did not complete treatment. Currently he is at [facility], TTE here re-demonstrates tricuspid vegetation but he has remained afebrile without leukocytosis, BCX NGTD. Currently on IV Daptomycin and Ceftaroline per prior ID recommendations during his September hospital stay. ID consulted for this admission, appreciate recommendations:  Patient seems to have cleared his bacteremia despite only 3 weeks of treatment, his blood cultures here have remained negative,  doubt retreatment with an additional course of antibiotics would help without active infection.  Okay to discontinue antibiotics    Stable for discharge with referral to ID.  Ketoconazole shampoo and hydroxyzine ordered for tinea corpis.  Plan to discharge to [facility], return precautions discussed no further questions at discharge       Goals of Care Treatment Preferences:  Code Status: Full Code      Consults:   Consults (From admission, onward)        Status Ordering Provider     Inpatient consult to Infectious Diseases  Once        Provider:  Tito Mendoza MD    Completed FRANCISCO ABEL          Cardiac/Vascular  * Endocarditis of tricuspid valve  Carlito Rosario is a 28M with history of IVDU currently at [facility] who was sent here by [facility] for endocarditis concerns (in 9/2023 he was diagnosed with endocarditis and had MRSA bacteremia but did not complete treatment)     Tachycardic  bpm, afebrile without leukocytosis   TTE shows medium mobile echogenic mass present on anterior leaflet of tricuspid valve  Bcx pending  Diagnosed with endocarditis and hospitalized in MICU in 09/2023 for septic shock,   Per ID notes in 09/2023: Daptomycin 580mg IV q24h and Ceftaroline 600mg IV q8H recommended for salvage; will continue for now  ID consulted, appreciate recommendations:    - now asymptomatic, off abx x 3 weeks  - blood cultures are sterile, thus far  - started on dapto and ceftaroline  - patient seem to have clear his bacteremia despite only three weeks of treatment  - doubt re-treatment with an additional course of abx would help without active infection   - therefore, would stop abx if surveillance blood cultures are sterile  Blood cultures no growth to date x3 days, stable for discharge back to [facility] without antibiotics.  Referral to ID at discharge      Final Active Diagnoses:    Diagnosis Date Noted POA    PRINCIPAL PROBLEM:  Endocarditis of tricuspid valve [I07.9]  09/14/2023 Yes    IV drug user [F19.90] 09/14/2023 Yes      Problems Resolved During this Admission:       Discharged Condition: stable    Disposition: Another Health Care Inst*    Follow Up:    Patient Instructions:      Ambulatory referral/consult to Infectious Disease   Standing Status: Future   Referral Priority: Routine Referral Type: Consultation   Referral Reason: Specialty Services Required   Requested Specialty: Infectious Diseases   Number of Visits Requested: 1     Ambulatory referral/consult to Smoking Cessation Program   Standing Status: Future   Referral Priority: Routine Referral Type: Consultation   Referral Reason: Specialty Services Required   Requested Specialty: CTTS   Number of Visits Requested: 1     Notify your health care provider if you experience any of the following:  temperature >100.4     Notify your health care provider if you experience any of the following:  severe uncontrolled pain     Notify your health care provider if you experience any of the following:  redness, tenderness, or signs of infection (pain, swelling, redness, odor or green/yellow discharge around incision site)     Notify your health care provider if you experience any of the following:  difficulty breathing or increased cough     Notify your health care provider if you experience any of the following:  worsening rash     Notify your health care provider if you experience any of the following:  persistent dizziness, light-headedness, or visual disturbances     Notify your health care provider if you experience any of the following:  increased confusion or weakness     Reason for not Prescribing Nicotine Replacement     Order Specific Question Answer Comments   Reason for not Prescribing: Not medically appropriate at this time      Activity as tolerated       Significant Diagnostic Studies: Microbiology:   Blood Culture   Lab Results   Component Value Date    LABBLOO No Growth to date 11/06/2023    LABBLOO No Growth  to date 11/06/2023     Cardiac Graphics: Echocardiogram:   Transthoracic echo (TTE) complete (Cupid Only):   Results for orders placed or performed during the hospital encounter of 11/06/23   Echo   Result Value Ref Range    BSA 1.94 m2    LVOT stroke volume 67.15 cm3    LVIDd 4.04 3.5 - 6.0 cm    LV Systolic Volume 23.78 mL    LV Systolic Volume Index 12.1 mL/m2    LVIDs 2.56 2.1 - 4.0 cm    LV Diastolic Volume 71.52 mL    LV Diastolic Volume Index 36.49 mL/m2    IVS 0.93 0.6 - 1.1 cm    LVOT diameter 1.99 cm    LVOT area 3.1 cm2    FS 37 28 - 44 %    Left Ventricle Relative Wall Thickness 0.50 cm    Posterior Wall 1.01 0.6 - 1.1 cm    LV mass 123.66 g    LV Mass Index 63 g/m2    MV Peak E Venancio 0.59 m/s    TDI LATERAL 0.22 m/s    TDI SEPTAL 0.13 m/s    E/E' ratio 3.37 m/s    MV Peak A Venancio 0.47 m/s    TR Max Venancio 2.46 m/s    E/A ratio 1.26     IVRT 51.38 msec    E wave deceleration time 161.10 msec    LV SEPTAL E/E' RATIO 4.54 m/s    LV LATERAL E/E' RATIO 2.68 m/s    LVOT peak venancio 1.12 m/s    Left Ventricular Outflow Tract Mean Velocity 0.72 cm/s    Left Ventricular Outflow Tract Mean Gradient 2.39 mmHg    RVDD 3.04 cm    RV S' 15.83 cm/s    LA size 3.34 cm    Left Atrium Minor Axis 4.92 cm    Left Atrium Major Axis 4.94 cm    RA Major Axis 5.52 cm    AV mean gradient 4 mmHg    AV peak gradient 7 mmHg    Ao peak venancio 1.30 m/s    Ao VTI 26.20 cm    LVOT peak VTI 21.60 cm    AV valve area 2.56 cm²    AV Velocity Ratio 0.86     AV index (prosthetic) 0.82     KRISTIE by Velocity Ratio 2.68 cm²    Mr max venancio 2.57 m/s    MV stenosis pressure 1/2 time 46.72 ms    MV valve area p 1/2 method 4.71 cm2    Triscuspid Valve Regurgitation Peak Gradient 24 mmHg    PV PEAK VELOCITY 0.60 m/s    PV peak gradient 1 mmHg    Pulmonary Valve Mean Velocity 0.44 m/s    Ao root annulus 2.53 cm    STJ 2.93 cm    Ascending aorta 2.27 cm    IVC diameter 2.07 cm    Mean e' 0.18 m/s    ZLVIDS -2.36     ZLVIDD -3.28     LA Volume Index 27.1 mL/m2    LA  volume 53.19 cm3    TAPSE 2.10 cm    LA WIDTH 3.8 cm    Left Atrium Area-systolic Apical Two Chamber 20.0 cm2    Left Atrium Area-systolic Four Chamber 18.0 cm2    RA Width 4.9 cm    Sinus 2.7 cm    TV resting pulmonary artery pressure 32 mmHg    RV TB RVSP 10 mmHg    Est. RA pres 8 mmHg    EF 65 %    Narrative      Left Ventricle: The left ventricle is normal in size. Ventricular mass   is normal. Normal wall thickness. There is concentric remodeling. Normal   wall motion. There is normal systolic function. Ejection fraction by   visual approximation is 65%. There is normal diastolic function. Normal   left ventricular filling pressure. Tissue Doppler velocity is normal.    Right Ventricle: Mild right ventricular enlargement. Wall thickness is   normal. Right ventricle wall motion has global hypokinesis. Systolic   function is mildly reduced.    Right Atrium: Right atrium is moderately dilated.    Tricuspid Valve: The tricuspid valve is trileaflet. There is a medium   mobile echogenic mass present on the anterior leaflet and septal leaflet.   There is severe regurgitation with a centrally directed jet.    Pulmonary Artery: No pulmonary hypertension. The estimated pulmonary   artery systolic pressure is 32 mmHg.    IVC/SVC: Intermediate venous pressure at 8 mmHg.         Pending Diagnostic Studies:     None         Medications:  Reconciled Home Medications:      Medication List      START taking these medications    hydrOXYzine HCL 25 MG tablet  Commonly known as: ATARAX  Take 1 tablet (25 mg total) by mouth 3 (three) times daily as needed for Itching or Anxiety.     ketoconazole 2 % shampoo  Commonly known as: NIZORAL  Apply topically once daily. for 14 days        CONTINUE taking these medications    buprenorphine-naloxone 8-2 mg 8-2 mg  Commonly known as: SUBOXONE  Place 8 mg under the tongue 2 (two) times a day.     ferrous gluconate 324 MG tablet  Commonly known as: FERGON  Take 324 mg by mouth with  breakfast.     folic acid 1 MG tablet  Commonly known as: FOLVITE  Take 1 mg by mouth once daily.     mirtazapine 15 MG tablet  Commonly known as: REMERON  Take 15 mg by mouth every evening.            Indwelling Lines/Drains at time of discharge:   Lines/Drains/Airways     None                 Time spent on the discharge of patient: >45 minutes         Edyta Rg PA-C  Department of Hospital Medicine  [facility] ([address])

## 2023-11-09 NOTE — PLAN OF CARE
Patient denies the use of HH or DME. Patient choice pharmacy is bedside. Patient wished to return to [facility]. Sw spoke with [facility] who stated patient would need transportation set up to return to [address] [place] [address]. Patient will need a lyft.     [facility] ([place] [facility])  Discharge Final Note    Primary Care Provider: No, Primary Doctor    Expected Discharge Date: 11/9/2023    Final Discharge Note (most recent)       Final Note - 11/09/23 1012          Final Note    Assessment Type Final Discharge Note (P)      Anticipated Discharge Disposition Home or Self Care (P)      Hospital Resources/Appts/Education Provided Provided patient/caregiver with written discharge plan information;Appointments scheduled and added to AVS (P)         Post-Acute Status    Post-Acute Authorization Other (P)      Other Status No Post-Acute Service Needs (P)      Discharge Delays Personal Transportation (P)                      Important Message from Medicare

## 2023-11-09 NOTE — PLAN OF CARE
Fernandez spoke with [facility] regarding patient returning. [facility] stated they can take patient back today but because it was 24 hours since patient was last there he was discharged from their system. Patient will need a Lyft to [facility] [place] [address] [place] [address].

## 2023-11-09 NOTE — NURSING
Discharged per MD orders.  Instructions given on at home medications, and follow-up appointments.  Awaiting patient escort for departure.

## 2023-11-12 LAB
BACTERIA BLD CULT: NORMAL
BACTERIA BLD CULT: NORMAL

## 2023-12-04 ENCOUNTER — TELEPHONE (OUTPATIENT)
Dept: HEPATOLOGY | Facility: CLINIC | Age: 28
End: 2023-12-04
Payer: MEDICAID

## 2023-12-04 NOTE — TELEPHONE ENCOUNTER
"----- Message from Eliseo Suggs sent at 12/4/2023 10:09 AM CST -----  Reschedule Existing Appointment      Appt Date: 12/8    Type of appt: Consult/Hep C/Hosp when sched    Physician: Scheuermann    Reason for rescheduling? Appt date no longer works; Requesting to r/s for soonest available    Caller: Rogelio (Father)    Contact Preference: [phone]      Additional Information:  "Thank you for all that you do for our patients"      "

## 2023-12-18 PROBLEM — A41.9 SEPSIS: Status: RESOLVED | Noted: 2023-09-14 | Resolved: 2023-12-18

## 2023-12-18 PROBLEM — R65.21 SEPTIC SHOCK: Status: RESOLVED | Noted: 2023-09-16 | Resolved: 2023-12-18

## 2023-12-18 PROBLEM — A41.9 SEPTIC SHOCK: Status: RESOLVED | Noted: 2023-09-16 | Resolved: 2023-12-18

## 2023-12-18 PROBLEM — J15.212 PNEUMONIA OF LEFT LOWER LOBE DUE TO METHICILLIN-RESISTANT STAPHYLOCOCCUS AUREUS (MRSA): Status: RESOLVED | Noted: 2023-09-14 | Resolved: 2023-12-18

## 2024-01-02 ENCOUNTER — TELEPHONE (OUTPATIENT)
Dept: HEPATOLOGY | Facility: CLINIC | Age: 29
End: 2024-01-02
Payer: MEDICAID

## 2024-01-02 NOTE — TELEPHONE ENCOUNTER
Patient was a no-show for scheduled appt with PA Scheuermann on 1/2/24.  Attempt made to reach him for rescheduling.  I lvm and sent a letter asking that he call hepatology.